# Patient Record
Sex: FEMALE | Race: WHITE | Employment: OTHER | URBAN - METROPOLITAN AREA
[De-identification: names, ages, dates, MRNs, and addresses within clinical notes are randomized per-mention and may not be internally consistent; named-entity substitution may affect disease eponyms.]

---

## 2017-01-11 ENCOUNTER — TELEPHONE (OUTPATIENT)
Dept: FAMILY MEDICINE | Facility: CLINIC | Age: 67
End: 2017-01-11

## 2017-01-11 NOTE — TELEPHONE ENCOUNTER
Was seen in clinic in Dec 2016 and blood pressure elevated.    BP Readings from Last 6 Encounters:   12/01/16 154/87   09/08/16 171/109   02/18/16 122/73   02/11/16 136/88   01/07/16 160/108   08/10/15 149/86       Due for blood pressure in clinic with RN.  Call to notify patient and assist in scheduling this.    SINDHU Gustafson

## 2017-01-11 NOTE — TELEPHONE ENCOUNTER
Patient returning call for BP recheck.  Scheduled an RN visit on 1/12/17 at 10:00 am for RN visit.  Also requesting a note for work at this visit.  Will close Panel Management encounter for now.

## 2017-01-12 ENCOUNTER — ALLIED HEALTH/NURSE VISIT (OUTPATIENT)
Dept: FAMILY MEDICINE | Facility: CLINIC | Age: 67
End: 2017-01-12
Payer: COMMERCIAL

## 2017-01-12 VITALS — SYSTOLIC BLOOD PRESSURE: 126 MMHG | DIASTOLIC BLOOD PRESSURE: 84 MMHG | RESPIRATION RATE: 18 BRPM | HEART RATE: 88 BPM

## 2017-01-12 DIAGNOSIS — I10 HYPERTENSION, GOAL BELOW 140/90: Primary | ICD-10-CM

## 2017-01-12 PROCEDURE — 99207 ZZC NO CHARGE NURSE ONLY: CPT

## 2017-01-12 NOTE — PROGRESS NOTES
"Cyndy Wang is a 66 year old female who comes in today for a Blood Pressure check because of medication change.    *Document pulse and BP  *Use new set of vitals button for multiple readings.  *Use extended vitals for orthostatic    Vitals as recorded, a regular cuff was used.    Patient is taking medication as prescribed  Patient is tolerating medications well.  Patient is monitoring Blood Pressure at home.  Average readings if yes are \"good\"    Current complaints: cough    Disposition: patient to continue with the same medication.  Also needs note to return to work after acute illness over the last 2 days.  Note given.    "

## 2017-01-12 NOTE — Clinical Note
[facility]  [address] [facility] [address]  Phone: [phone]    January 12, 2017    Cyndy Wang  [address] [place] [address]  [facility] [address]              Cyndy Wang was in the clinic today, she reports that she was sick 1/10/17 and 1/11/17.  Please excuse her from work on those dates, she may return to work today as her symptoms have resolved.              Sincerely,      SINDHU Gustafson / brittp

## 2017-03-13 ENCOUNTER — TELEPHONE (OUTPATIENT)
Dept: PHARMACY | Facility: OTHER | Age: 67
End: 2017-03-13

## 2017-03-13 ENCOUNTER — TELEPHONE (OUTPATIENT)
Dept: FAMILY MEDICINE | Facility: CLINIC | Age: 67
End: 2017-03-13

## 2017-03-13 NOTE — TELEPHONE ENCOUNTER
MTM referral from: [facility] visit (referral by provider)    MTM referral outreach attempt #1 on March 13, 2017 at 3:48 PM      Outcome: No Answer    Carmenza Negrete, MTM Coordinator

## 2017-03-14 NOTE — TELEPHONE ENCOUNTER
MTM referral from: [facility]     MTM referral outreach attempt #2 on March 14, 2017 at 10:32 AM      Outcome: Patient not reachable after several attempts (insurance does not cover MTM), will route to MTM Pharmacist/Provider as an FYI. Thank you for the referral.    Carmenza Negrete, MTM Coordinator

## 2017-05-04 ENCOUNTER — OFFICE VISIT (OUTPATIENT)
Dept: FAMILY MEDICINE | Facility: CLINIC | Age: 67
End: 2017-05-04
Payer: COMMERCIAL

## 2017-05-04 VITALS
WEIGHT: 221.2 LBS | TEMPERATURE: 97.7 F | HEART RATE: 114 BPM | SYSTOLIC BLOOD PRESSURE: 132 MMHG | BODY MASS INDEX: 34.72 KG/M2 | DIASTOLIC BLOOD PRESSURE: 87 MMHG | HEIGHT: 67 IN

## 2017-05-04 DIAGNOSIS — R31.9 HEMATURIA: Primary | ICD-10-CM

## 2017-05-04 DIAGNOSIS — E11.65 TYPE 2 DIABETES MELLITUS WITH HYPERGLYCEMIA, WITHOUT LONG-TERM CURRENT USE OF INSULIN (H): ICD-10-CM

## 2017-05-04 DIAGNOSIS — N10 ACUTE PYELONEPHRITIS: ICD-10-CM

## 2017-05-04 LAB
ALBUMIN UR-MCNC: 100 MG/DL
ANION GAP SERPL CALCULATED.3IONS-SCNC: 8 MMOL/L (ref 3–14)
APPEARANCE UR: ABNORMAL
BACTERIA #/AREA URNS HPF: ABNORMAL /HPF
BILIRUB UR QL STRIP: NEGATIVE
BUN SERPL-MCNC: 27 MG/DL (ref 7–30)
CALCIUM SERPL-MCNC: 9.4 MG/DL (ref 8.5–10.1)
CHLORIDE SERPL-SCNC: 96 MMOL/L (ref 94–109)
CO2 SERPL-SCNC: 27 MMOL/L (ref 20–32)
COLOR UR AUTO: YELLOW
CREAT SERPL-MCNC: 1.22 MG/DL (ref 0.52–1.04)
CREAT UR-MCNC: 129 MG/DL
GFR SERPL CREATININE-BSD FRML MDRD: 44 ML/MIN/1.7M2
GLUCOSE SERPL-MCNC: 419 MG/DL (ref 70–99)
GLUCOSE UR STRIP-MCNC: >=1000 MG/DL
HBA1C MFR BLD: 10.5 % (ref 4.3–6)
HGB BLD-MCNC: 14.7 G/DL (ref 11.7–15.7)
HGB UR QL STRIP: ABNORMAL
KETONES UR STRIP-MCNC: ABNORMAL MG/DL
LEUKOCYTE ESTERASE UR QL STRIP: ABNORMAL
MICROALBUMIN UR-MCNC: 468 MG/L
MICROALBUMIN/CREAT UR: 362.79 MG/G CR (ref 0–25)
NITRATE UR QL: NEGATIVE
NON-SQ EPI CELLS #/AREA URNS LPF: ABNORMAL /LPF
PH UR STRIP: 5 PH (ref 5–7)
POTASSIUM SERPL-SCNC: 4.2 MMOL/L (ref 3.4–5.3)
RBC #/AREA URNS AUTO: ABNORMAL /HPF (ref 0–2)
SODIUM SERPL-SCNC: 131 MMOL/L (ref 133–144)
SP GR UR STRIP: 1.02 (ref 1–1.03)
URN SPEC COLLECT METH UR: ABNORMAL
UROBILINOGEN UR STRIP-ACNC: 0.2 EU/DL (ref 0.2–1)
WBC #/AREA URNS AUTO: ABNORMAL /HPF (ref 0–2)

## 2017-05-04 PROCEDURE — 85018 HEMOGLOBIN: CPT | Performed by: NURSE PRACTITIONER

## 2017-05-04 PROCEDURE — 87086 URINE CULTURE/COLONY COUNT: CPT | Performed by: NURSE PRACTITIONER

## 2017-05-04 PROCEDURE — 80048 BASIC METABOLIC PNL TOTAL CA: CPT | Performed by: NURSE PRACTITIONER

## 2017-05-04 PROCEDURE — 87088 URINE BACTERIA CULTURE: CPT | Performed by: NURSE PRACTITIONER

## 2017-05-04 PROCEDURE — 87186 SC STD MICRODIL/AGAR DIL: CPT | Performed by: NURSE PRACTITIONER

## 2017-05-04 PROCEDURE — 83036 HEMOGLOBIN GLYCOSYLATED A1C: CPT | Performed by: NURSE PRACTITIONER

## 2017-05-04 PROCEDURE — 82043 UR ALBUMIN QUANTITATIVE: CPT | Performed by: NURSE PRACTITIONER

## 2017-05-04 PROCEDURE — 99214 OFFICE O/P EST MOD 30 MIN: CPT | Performed by: NURSE PRACTITIONER

## 2017-05-04 PROCEDURE — 81001 URINALYSIS AUTO W/SCOPE: CPT | Performed by: NURSE PRACTITIONER

## 2017-05-04 PROCEDURE — 36415 COLL VENOUS BLD VENIPUNCTURE: CPT | Performed by: NURSE PRACTITIONER

## 2017-05-04 RX ORDER — CIPROFLOXACIN 250 MG/1
250 TABLET, FILM COATED ORAL 2 TIMES DAILY
Qty: 14 TABLET | Refills: 0 | Status: SHIPPED | OUTPATIENT
Start: 2017-05-04 | End: 2017-05-11

## 2017-05-04 NOTE — PROGRESS NOTES
"  SUBJECTIVE:                                                    Cyndy Wang is a 67 year old female who presents to clinic today for the following health issues: small amounts of blood in urine, dark color urine, frequent and painful urination, lower abdominal pain and bilateral flank dull ache.   Symptoms started about 2 weeks ago.  Has history of diabetes, likely uncontrolled, states that her sugars been high at home, sometimes over 200, she is on Metformin 2000 mg daily, she was prescribed Victosa, but its not covered by her insurance. Reports that she is not consistently following diabetic diet, reports drinking pop every day.     URINARY TRACT SYMPTOMS     Onset: 2 weeks     Description:   Painful urination (Dysuria): YES  Blood in urine (Hematuria): YES  Delay in urine (Hesitency): no     Intensity: moderate    Progression of Symptoms:  worsening    Accompanying Signs & Symptoms:  Fever/chills: Sweats at night   Flank pain YES  Nausea and vomiting: no once in awhile she gets dizzy   Any vaginal symptoms: vaginal odor and abnormal vaginal bleeding  Abdominal/Pelvic Pain: YES   History:   History of frequent UTI's: no   History of kidney stones: no   Sexually Active: no   Possibility of pregnancy: No    Precipitating factors:   None        Therapies Tried and outcome: none     Problem list and histories reviewed & adjusted, as indicated.  Additional history: as documented    Labs reviewed in EPIC    Reviewed and updated as needed this visit by clinical staff  Tobacco  Allergies  Med Hx  Surg Hx  Fam Hx  Soc Hx      Reviewed and updated as needed this visit by Provider         ROS:  Constitutional, HEENT, cardiovascular, pulmonary, gi and gu systems are negative, except as otherwise noted.    OBJECTIVE:                                                    /87  Pulse 114  Temp 97.7  F (36.5  C) (Tympanic)  Ht 5' 7\" (1.702 m)  Wt 221 lb 3.2 oz (100.3 kg)  BMI 34.64 kg/m2  Body mass index is 34.64 " kg/(m^2).  GENERAL: healthy, alert and no distress  RESP: lungs clear to auscultation - no rales, rhonchi or wheezes  CV: regular rate and rhythm, normal S1 S2, no S3 or S4, no murmur, click or rub, no peripheral edema and peripheral pulses strong  ABDOMEN: tenderness suprapubic and bowel sounds normal  BACK: bilateral CVA tenderness    Diagnostic Test Results:  Urinalysis - positive for UTI      ASSESSMENT/PLAN:                                                      1. Hematuria  - UA with Microscopic reflex to Culture-positive for UTI, glucosuria due to uncontrolled diabetes, has history of frequent urinary infections, likely due to uncontrolled diabetes, added urine culture-pending   - Basic metabolic panel-hyperglycemia, hyponatremia and slightly elevated Creatinine level due to uncontrolled diabetes   - Hemoglobin-normal   - Urine Culture Aerobic Bacterial  - start ciprofloxacin (CIPRO) 250 MG tablet; Take 1 tablet (250 mg) by mouth 2 times daily for 7 days  Dispense: 14 tablet; Refill: 0    2. Type 2 diabetes mellitus with hyperglycemia, without long-term current use of insulin (H)  - Hemoglobin A1C-10.5%  - start insulin glargine (LANTUS) 100 UNIT/ML injection; Inject 10 Units Subcutaneous At Bedtime  Dispense: 3 mL; Refill: 1  - insulin pen needle (B-D U/F) 31G X 8 MM; Use once daily or as directed.  Dispense: 30 each; Refill: prn  -continue Metformin 2000 mg daily  - DIABETES EDUCATOR REFERRAL  - Albumin Random Urine Quantitative  -follow up in 2 weeks to recheck labs and diabetes, should bring list of her home accu-checks     3. Acute pyelonephritis  -has CVA tenderness, mild proteinuria, no fevers, chills, nausea, will start Cipro to cover possible pyelonephritis    - Urine Culture Aerobic Bacterial  - ciprofloxacin (CIPRO) 250 MG tablet; Take 1 tablet (250 mg) by mouth 2 times daily for 7 days  Dispense: 14 tablet; Refill: 0  -drink more fluids  -avoid pop, sodas, all sugary drinks     See Patient  Instructions    TY Wong [facility]

## 2017-05-04 NOTE — NURSING NOTE
"Chief Complaint   Patient presents with     Vaginal Problem     When she wipes there is blood on the toilet paper, has been going on for 2 weeks.        Initial /87  Pulse 114  Temp 97.7  F (36.5  C) (Tympanic)  Ht 5' 7\" (1.702 m)  Wt 221 lb 3.2 oz (100.3 kg)  BMI 34.64 kg/m2 Estimated body mass index is 34.64 kg/(m^2) as calculated from the following:    Height as of this encounter: 5' 7\" (1.702 m).    Weight as of this encounter: 221 lb 3.2 oz (100.3 kg).  Medication Reconciliation: complete  "

## 2017-05-04 NOTE — PATIENT INSTRUCTIONS
A1C  Electrolytes and kidney function  Potassium was probably low due to high blood sugars  Will check diabetic labs today, will consider starting additional medication to help to lower sugars       Urine positive for UTI   Start Cipro 1 tablet twice daily for 7 days   Drink enough fluids  Stop drinking soda and pop  Follow diabetic diet

## 2017-05-07 LAB
BACTERIA SPEC CULT: ABNORMAL
MICRO REPORT STATUS: ABNORMAL
MICROORGANISM SPEC CULT: ABNORMAL
SPECIMEN SOURCE: ABNORMAL

## 2017-05-10 ENCOUNTER — TELEPHONE (OUTPATIENT)
Dept: FAMILY MEDICINE | Facility: CLINIC | Age: 67
End: 2017-05-10

## 2017-05-10 DIAGNOSIS — E11.69 TYPE 2 DIABETES MELLITUS WITH OTHER SPECIFIED COMPLICATION (H): Primary | ICD-10-CM

## 2017-05-10 NOTE — TELEPHONE ENCOUNTER
Below is copied from last lab result note.    Notes Recorded by Tammy Zelaya APRN CNP on 5/4/2017 at 4:36 PM  Please inform patient that her Hemoglobin A1C is very high 10.5%  Glucose level is 419, uncontrolled diabetes affecting her kidney function, creatinine level 1.22 (slightly elevated), she has hyponatremia-low Sodium and this is due to very high sugars.  Start Lantus 10 units daily, continue Metformin, monitor blood sugars daily, follow diabetic diet and also recommend to schedule with diabetic educator. Follow up in 2 weeks to recheck labs, bring list of home glucose monitoring numbers.        TY Wong CNP        Prescriptions sent to pharmacy per protocol.    Rebekah Nguyen RN

## 2017-05-10 NOTE — TELEPHONE ENCOUNTER
Reason for Call:  Other prescription    Detailed comments: Pt calling to ask Dr. Zelaya for Rxs for a new Diabetic testing meter, test strips and lancets - [facility] pharmacy  - So that he insurance will cover the cost    Phone Number Patient can be reached at: Home number on file [phone] (home)    Best Time: any    Can we leave a detailed message on this number? YES    Call taken on 5/10/2017 at 2:50 PM by Patriac Cotter

## 2017-07-26 ENCOUNTER — TELEPHONE (OUTPATIENT)
Dept: FAMILY MEDICINE | Facility: CLINIC | Age: 67
End: 2017-07-26

## 2017-07-26 NOTE — TELEPHONE ENCOUNTER
Panel Management Review      Patient has the following on her problem list:     Diabetes    ASA: Not Required     Last A1C  Lab Results   Component Value Date    A1C 10.5 05/04/2017    A1C 7.0 12/01/2016    A1C 7.9 02/11/2016    A1C 6.1 06/02/2015    A1C 7.5 01/15/2015     A1C tested: FAILED    Last LDL:    Lab Results   Component Value Date    CHOL 170 02/11/2016     Lab Results   Component Value Date    HDL 37 02/11/2016     Lab Results   Component Value Date     02/11/2016     Lab Results   Component Value Date    TRIG 125 02/11/2016     Lab Results   Component Value Date    CHOLHDLRATIO 5.9 01/15/2015     Lab Results   Component Value Date    NHDL 133 02/11/2016       Is the patient on a Statin? YES             Is the patient on Aspirin? NO    Medications     HMG CoA Reductase Inhibitors    pravastatin (PRAVACHOL) 20 MG tablet    Salicylates    aspirin 325 MG EC tablet          Last three blood pressure readings:  BP Readings from Last 3 Encounters:   05/04/17 132/87   01/12/17 126/84   12/01/16 154/87       Date of last diabetes office visit: 5/4/17     Tobacco History:     History   Smoking Status     Former Smoker     Types: Cigarettes   Smokeless Tobacco     Never Used     Comment: 1-2 cigarettes occasionally             Composite cancer screening  Chart review shows that this patient is due/due soon for the following Mammogram  Summary:    Patient is due/failing the following:   A1C    Action needed:   Patient needs office visit for diabetic check and labs.    Type of outreach:    Phone, left message for patient to call back.     Questions for provider review:    None                                                                                                                                    Hoa Gunter CMA ([facility])       Chart routed to Care Team .

## 2017-08-24 ENCOUNTER — OFFICE VISIT (OUTPATIENT)
Dept: FAMILY MEDICINE | Facility: CLINIC | Age: 67
End: 2017-08-24
Payer: COMMERCIAL

## 2017-08-24 VITALS
SYSTOLIC BLOOD PRESSURE: 136 MMHG | HEIGHT: 67 IN | TEMPERATURE: 97 F | WEIGHT: 227 LBS | BODY MASS INDEX: 35.63 KG/M2 | DIASTOLIC BLOOD PRESSURE: 82 MMHG

## 2017-08-24 DIAGNOSIS — R21 RASH AND NONSPECIFIC SKIN ERUPTION: Primary | ICD-10-CM

## 2017-08-24 DIAGNOSIS — I10 HYPERTENSION GOAL BP (BLOOD PRESSURE) < 140/90: ICD-10-CM

## 2017-08-24 DIAGNOSIS — E78.5 HYPERLIPIDEMIA LDL GOAL <100: ICD-10-CM

## 2017-08-24 DIAGNOSIS — Z12.31 ENCOUNTER FOR SCREENING MAMMOGRAM FOR BREAST CANCER: ICD-10-CM

## 2017-08-24 DIAGNOSIS — F50.819 BINGE EATING DISORDER: ICD-10-CM

## 2017-08-24 DIAGNOSIS — E66.09 OBESITY DUE TO EXCESS CALORIES, UNSPECIFIED OBESITY SEVERITY: ICD-10-CM

## 2017-08-24 DIAGNOSIS — E11.69 TYPE 2 DIABETES MELLITUS WITH OTHER SPECIFIED COMPLICATION (H): ICD-10-CM

## 2017-08-24 DIAGNOSIS — F33.1 MAJOR DEPRESSIVE DISORDER, RECURRENT EPISODE, MODERATE (H): ICD-10-CM

## 2017-08-24 DIAGNOSIS — G35 MS (MULTIPLE SCLEROSIS) (H): ICD-10-CM

## 2017-08-24 DIAGNOSIS — E11.29 TYPE 2 DIABETES MELLITUS WITH OTHER DIABETIC KIDNEY COMPLICATION (H): ICD-10-CM

## 2017-08-24 LAB
ANION GAP SERPL CALCULATED.3IONS-SCNC: 5 MMOL/L (ref 3–14)
BUN SERPL-MCNC: 16 MG/DL (ref 7–30)
CALCIUM SERPL-MCNC: 9.5 MG/DL (ref 8.5–10.1)
CHLORIDE SERPL-SCNC: 102 MMOL/L (ref 94–109)
CHOLEST SERPL-MCNC: 212 MG/DL
CO2 SERPL-SCNC: 30 MMOL/L (ref 20–32)
CREAT SERPL-MCNC: 0.9 MG/DL (ref 0.52–1.04)
GFR SERPL CREATININE-BSD FRML MDRD: 62 ML/MIN/1.7M2
GLUCOSE SERPL-MCNC: 190 MG/DL (ref 70–99)
HBA1C MFR BLD: 8.2 % (ref 4.3–6)
HDLC SERPL-MCNC: 37 MG/DL
LDLC SERPL CALC-MCNC: 141 MG/DL
NONHDLC SERPL-MCNC: 175 MG/DL
POTASSIUM SERPL-SCNC: 4.5 MMOL/L (ref 3.4–5.3)
SODIUM SERPL-SCNC: 137 MMOL/L (ref 133–144)
TRIGL SERPL-MCNC: 171 MG/DL
TSH SERPL DL<=0.005 MIU/L-ACNC: 2.25 MU/L (ref 0.4–4)

## 2017-08-24 PROCEDURE — 99215 OFFICE O/P EST HI 40 MIN: CPT | Performed by: NURSE PRACTITIONER

## 2017-08-24 PROCEDURE — 80061 LIPID PANEL: CPT | Performed by: NURSE PRACTITIONER

## 2017-08-24 PROCEDURE — 84443 ASSAY THYROID STIM HORMONE: CPT | Performed by: NURSE PRACTITIONER

## 2017-08-24 PROCEDURE — 80048 BASIC METABOLIC PNL TOTAL CA: CPT | Performed by: NURSE PRACTITIONER

## 2017-08-24 PROCEDURE — 83036 HEMOGLOBIN GLYCOSYLATED A1C: CPT | Performed by: NURSE PRACTITIONER

## 2017-08-24 PROCEDURE — 36415 COLL VENOUS BLD VENIPUNCTURE: CPT | Performed by: NURSE PRACTITIONER

## 2017-08-24 PROCEDURE — 99207 C FOOT EXAM  NO CHARGE: CPT | Performed by: NURSE PRACTITIONER

## 2017-08-24 RX ORDER — TRIAMCINOLONE ACETONIDE 1 MG/G
CREAM TOPICAL
Qty: 15 G | Refills: 0 | Status: SHIPPED | OUTPATIENT
Start: 2017-08-24 | End: 2021-03-05

## 2017-08-24 RX ORDER — PIOGLITAZONEHYDROCHLORIDE 30 MG/1
30 TABLET ORAL DAILY
Qty: 30 TABLET | Refills: 3 | Status: SHIPPED | OUTPATIENT
Start: 2017-08-24 | End: 2017-08-25

## 2017-08-24 RX ORDER — TOPIRAMATE 25 MG/1
TABLET, FILM COATED ORAL
Qty: 60 TABLET | Refills: 6 | Status: SHIPPED | OUTPATIENT
Start: 2017-08-24 | End: 2018-01-16

## 2017-08-24 ASSESSMENT — PATIENT HEALTH QUESTIONNAIRE - PHQ9
SUM OF ALL RESPONSES TO PHQ QUESTIONS 1-9: 2
5. POOR APPETITE OR OVEREATING: SEVERAL DAYS

## 2017-08-24 ASSESSMENT — ANXIETY QUESTIONNAIRES
IF YOU CHECKED OFF ANY PROBLEMS ON THIS QUESTIONNAIRE, HOW DIFFICULT HAVE THESE PROBLEMS MADE IT FOR YOU TO DO YOUR WORK, TAKE CARE OF THINGS AT HOME, OR GET ALONG WITH OTHER PEOPLE: SOMEWHAT DIFFICULT
7. FEELING AFRAID AS IF SOMETHING AWFUL MIGHT HAPPEN: NOT AT ALL
GAD7 TOTAL SCORE: 3
2. NOT BEING ABLE TO STOP OR CONTROL WORRYING: NOT AT ALL
6. BECOMING EASILY ANNOYED OR IRRITABLE: SEVERAL DAYS
3. WORRYING TOO MUCH ABOUT DIFFERENT THINGS: NOT AT ALL
5. BEING SO RESTLESS THAT IT IS HARD TO SIT STILL: NOT AT ALL
1. FEELING NERVOUS, ANXIOUS, OR ON EDGE: SEVERAL DAYS

## 2017-08-24 NOTE — MR AVS SNAPSHOT
After Visit Summary   8/24/2017    Cyndy Wang    MRN: [id number]           Patient Information     Date Of Birth          1950        Visit Information        Provider Department      8/24/2017 9:40 AM La Anderson NP [facility]        Today's Diagnoses     Rash and nonspecific skin eruption    -  1    MS (multiple sclerosis) (H)        Hyperlipidemia LDL goal <100        Hypertension goal BP (blood pressure) < 140/90        Type 2 diabetes mellitus with other diabetic kidney complication (H)        Type 2 diabetes mellitus with other specified complication (H)        Major depressive disorder, recurrent episode, moderate (H)        Obesity due to excess calories, unspecified obesity severity        Binge eating disorder        Encounter for screening mammogram for breast cancer          Care Instructions    Start Pioglitazone 30 mg once daily in am.  Recheck labs and follow up Diabetic visit in 3 months.    Hypoglycemia Treatment/Prevention:    Avoid refined foods heavy in sugars or refined (white) flour.  Examples would be baked goods, breads, white pastas, white rice, sugar beverages, alcohol,etc.    If you do eat meals with these ingredients, try to eat smaller amounts and combine them with lean meats or other proteins.  Eating some nuts like almonds or a small amount of non-fat cottage cheese between meals is often helpful as well.      Spread your calories out throughout the day more.  Instead of 2 or 3 meals, eat the same amount of calories spread out over 5 or 6 meals.      If you become symptomatic, have something to eat like an apple or milk.  This should help resolve the symptoms.    As a general rule, the healthier your lifestyle is and the closer you are to your ideal weight, the less likely you will be to have problems with this.  In some cases, these symptoms can indicate a predisposition to diabetes in the future so it is important to have a fasting blood  sugar checked yearly.    SINDHU Gustafson            Follow-ups after your visit        Additional Services     OPHTHALMOLOGY ADULT REFERRAL       Your provider has referred you to: [facility]: [facility] [phone]   [email or web address]    Please be aware that coverage of these services is subject to the terms and limitations of your health insurance plan.  Call member services at your health plan with any benefit or coverage questions.      Please bring the following with you to your appointment:    (1) Any X-Rays, CTs or MRIs which have been performed.  Contact the facility where they were done to arrange for  prior to your scheduled appointment.    (2) List of current medications  (3) This referral request   (4) Any documents/labs given to you for this referral                  Future tests that were ordered for you today     Open Future Orders        Priority Expected Expires Ordered    *MA Screening Digital Bilateral Routine  8/24/2018 8/24/2017            Who to contact     If you have questions or need follow up information about today's clinic visit or your schedule please contact [facility] directly at [phone].  Normal or non-critical lab and imaging results will be communicated to you by MyChart, letter or phone within 4 business days after the clinic has received the results. If you do not hear from us within 7 days, please contact the clinic through [employer]ck[employer] or phone. If you have a critical or abnormal lab result, we will notify you by phone as soon as possible.  Submit refill requests through GoPlaceIt or call your pharmacy and they will forward the refill request to us. Please allow 3 business days for your refill to be completed.          Additional Information About Your Visit        GoPlaceIt Information     GoPlaceIt lets you send messages to your doctor, view your test results, renew your prescriptions, schedule appointments and more. To sign up, go  "to [email or web address] . Click on \"Log in\" on the left side of the screen, which will take you to the Welcome page. Then click on \"Sign up Now\" on the right side of the page.     You will be asked to enter the access code listed below, as well as some personal information. Please follow the directions to create your username and password.     Your access code is: [email or web address]  Expires: 2017 10:22 AM     Your access code will  in 90 days. If you need help or a new code, please call your [facility] clinic or [phone].        Care EveryWhere ID     This is your Care EveryWhere ID. This could be used by other organizations to access your [facility] medical records  [id number]        Your Vitals Were     Temperature Height Breastfeeding? BMI (Body Mass Index)          97  F (36.1  C) (Tympanic) 5' 7\" (1.702 m) No 35.55 kg/m2         Blood Pressure from Last 3 Encounters:   17 136/82   17 132/87   17 126/84    Weight from Last 3 Encounters:   17 227 lb (103 kg)   17 221 lb 3.2 oz (100.3 kg)   16 224 lb (101.6 kg)              We Performed the Following     Basic metabolic panel     DEPRESSION ACTION PLAN (DAP)     FOOT EXAM     Hemoglobin A1c     Lipid panel reflex to direct LDL     OPHTHALMOLOGY ADULT REFERRAL     TSH with free T4 reflex          Today's Medication Changes          These changes are accurate as of: 17 10:22 AM.  If you have any questions, ask your nurse or doctor.               Start taking these medicines.        Dose/Directions    pioglitazone 30 MG tablet   Commonly known as:  ACTOS   Used for:  Type 2 diabetes mellitus with other diabetic kidney complication (H), Type 2 diabetes mellitus with other specified complication (H)   Started by:  La Anderson NP        Dose:  30 mg   Take 1 tablet (30 mg) by mouth daily   Quantity:  30 tablet   Refills:  3       triamcinolone 0.1 % cream   Commonly known as:  KENALOG   Used for:  Rash and " nonspecific skin eruption   Started by:  La Anderson NP        Apply sparingly to affected area three times daily for 14 days.   Quantity:  15 g   Refills:  0         These medicines have changed or have updated prescriptions.        Dose/Directions    topiramate 25 MG tablet   Commonly known as:  TOPAMAX   This may have changed:  additional instructions   Used for:  Obesity due to excess calories, unspecified obesity severity, Binge eating disorder   Changed by:  La Anderson NP        Take 1 tablet in am and 1 tablet in pm.   Quantity:  60 tablet   Refills:  6            Where to get your medicines      These medications were sent to [facility] [address]  [address] [facility] [address] [facility] [address]     Phone:  [phone]     pioglitazone 30 MG tablet    topiramate 25 MG tablet    triamcinolone 0.1 % cream                Primary Care Provider Office Phone # Fax #    La Anderson [phone] [address] [facility] [address]        Equal Access to Services     ALTHEA CARLOS : Hadii aad ku hadasho Soomaali, waaxda luqadaha, qaybta kaalmada adeegyada, waxay idiin hayaan dionneeg khjesusita hall . So [facility] [phone].    ATENCIÓN: Si habla español, tiene a martínez disposición servicios gratuitos de asistencia lingüística. [facility] [phone].    We comply with applicable federal civil rights laws and [place] laws. We do not discriminate on the basis of race, color, national origin, age, disability sex, sexual orientation or gender identity.            Thank you!     Thank you for choosing [facility]  for your care. Our goal is always to provide you with excellent care. Hearing back from our patients is one way we can continue to improve our services. Please take a few minutes to complete the written survey that you may receive in the mail after your visit with us. Thank you!             Your Updated Medication List -  Protect others around you: Learn how to safely use, store and throw away your medicines at www.disposemymeds.org.          This list is accurate as of: 8/24/17 10:22 AM.  Always use your most recent med list.                   Brand Name Dispense Instructions for use Diagnosis    ACE NOT PRESCRIBED (INTENTIONAL)     0 each    1 each ACE Inhibitor not prescribed due to Allergy        aspirin 325 MG EC tablet     100 tablet    Take 1 tablet by mouth daily.    Type 2 diabetes, HbA1C goal < 8% (H)       blood glucose lancets standard    no brand specified    1 Box    Use to test blood sugar daily    Type 2 diabetes mellitus with other specified complication (H)       blood glucose monitoring meter device kit    no brand specified    1 kit    Use to test blood sugar daily.    Type 2 diabetes mellitus with other specified complication (H)       * blood glucose monitoring test strip    no brand specified    100 strip    by In Vitro route 4 times daily.    Type 2 diabetes, HbA1C goal < 8% (H)       * blood glucose monitoring test strip    no brand specified    100 strip    Use to test blood sugars daily    Type 2 diabetes mellitus with other specified complication (H)       insulin pen needle 31G X 8 MM    B-D U/F    30 each    Use once daily or as directed.    Type 2 diabetes mellitus with hyperglycemia, without long-term current use of insulin (H)       levothyroxine 175 MCG tablet    SYNTHROID/LEVOTHROID    90 tablet    Take 1 tablet (175 mcg) by mouth daily    Hypothyroidism, unspecified type       losartan 50 MG tablet    COZAAR    30 tablet    Take 1 tablet (50 mg) by mouth daily    Type 2 diabetes mellitus with other diabetic kidney complication (H), Type 2 diabetes mellitus with other specified complication (H)       * order for DME     1 each    Glucometer, brand as covered by insurance.    Type 2 diabetes, HbA1C goal < 8% (H)       * order for DME     400 each    Test strips for pt's glucometer, brand as covered  by insurance. Test four times daily and prn.    Type 2 diabetes, HbA1C goal < 8% (H)       * order for DME     400 each    Lancets.  Four times daily and prn.    Type 2 diabetes, HbA1C goal < 8% (H)       pioglitazone 30 MG tablet    ACTOS    30 tablet    Take 1 tablet (30 mg) by mouth daily    Type 2 diabetes mellitus with other diabetic kidney complication (H), Type 2 diabetes mellitus with other specified complication (H)       pravastatin 20 MG tablet    PRAVACHOL    30 tablet    Take 1 tablet (20 mg) by mouth daily    Hyperlipidemia LDL goal <100       topiramate 25 MG tablet    TOPAMAX    60 tablet    Take 1 tablet in am and 1 tablet in pm.    Obesity due to excess calories, unspecified obesity severity, Binge eating disorder       triamcinolone 0.1 % cream    KENALOG    15 g    Apply sparingly to affected area three times daily for 14 days.    Rash and nonspecific skin eruption       venlafaxine 75 MG 24 hr capsule    EFFEXOR-XR    270 capsule    Take 3 capsules (225 mg) daily.    Major depressive disorder, recurrent episode, moderate (H)       * Notice:  This list has 5 medication(s) that are the same as other medications prescribed for you. Read the directions carefully, and ask your doctor or other care provider to review them with you.

## 2017-08-24 NOTE — NURSING NOTE
"Initial /82  Temp 97  F (36.1  C) (Tympanic)  Ht 5' 7\" (1.702 m)  Wt 227 lb (103 kg)  Breastfeeding? No  BMI 35.55 kg/m2 Estimated body mass index is 35.55 kg/(m^2) as calculated from the following:    Height as of this encounter: 5' 7\" (1.702 m).    Weight as of this encounter: 227 lb (103 kg). .    Hoa Guntre CMA ([facility])  "

## 2017-08-24 NOTE — PATIENT INSTRUCTIONS
Start Pioglitazone 30 mg once daily in am.  Recheck labs and follow up Diabetic visit in 3 months.    Hypoglycemia Treatment/Prevention:    Avoid refined foods heavy in sugars or refined (white) flour.  Examples would be baked goods, breads, white pastas, white rice, sugar beverages, alcohol,etc.    If you do eat meals with these ingredients, try to eat smaller amounts and combine them with lean meats or other proteins.  Eating some nuts like almonds or a small amount of non-fat cottage cheese between meals is often helpful as well.      Spread your calories out throughout the day more.  Instead of 2 or 3 meals, eat the same amount of calories spread out over 5 or 6 meals.      If you become symptomatic, have something to eat like an apple or milk.  This should help resolve the symptoms.    As a general rule, the healthier your lifestyle is and the closer you are to your ideal weight, the less likely you will be to have problems with this.  In some cases, these symptoms can indicate a predisposition to diabetes in the future so it is important to have a fasting blood sugar checked yearly.    SINDHU Gustafson

## 2017-08-24 NOTE — PROGRESS NOTES
SUBJECTIVE:   Cyndy Wang is a 67 year old female who presents to clinic today for the following health issues:    Diabetes Follow-up      Patient is checking blood sugars: not at all    Diabetic concerns: None     Symptoms of hypoglycemia (low blood sugar): shaky, dizzy, weak     Paresthesias (numbness or burning in feet) or sores: Yes, tingling in her toes.      Date of last diabetic eye exam: she has not had one this year yet, she will be scheduling soon as she needs new glasses.       Was on Lantus and Metformin - stopped these.  Non compliant with medications.    Lab Results   Component Value Date    A1C 10.5 05/04/2017    A1C 7.0 12/01/2016    A1C 7.9 02/11/2016    A1C 6.1 06/02/2015    A1C 7.5 01/15/2015       Hyperlipidemia Follow-Up      Rate your low fat/cholesterol diet?: fair    Taking statin?  Yes, no muscle aches from statin    Other lipid medications/supplements?:  None    Lab Results   Component Value Date    CHOL 170 02/11/2016     Lab Results   Component Value Date    HDL 37 02/11/2016     Lab Results   Component Value Date     02/11/2016     Lab Results   Component Value Date    TRIG 125 02/11/2016     Lab Results   Component Value Date    CHOLHDLRATIO 5.9 01/15/2015         Hypertension Follow-up      Outpatient blood pressures are not being checked.    Low Salt Diet: low salt    BP Readings from Last 3 Encounters:   08/24/17 136/82   05/04/17 132/87   01/12/17 126/84             Amount of exercise or physical activity: no structured exercise but she stays very active in her job.     Problems taking medications regularly: No    Medication side effects: none       Diet: low salt and low fat/cholesterol      Problem list and histories reviewed & adjusted, as indicated.  Additional history: as documented    Patient Active Problem List   Diagnosis     MS (multiple sclerosis) (H)     Hyperlipidemia LDL goal <100     Vitamin D deficiency disease     Health Care Home     Advanced directives,  counseling/discussion     Generalized anxiety disorder     Hypertension goal BP (blood pressure) < 140/90     Type 2 diabetes mellitus with other diabetic kidney complication (H)     Obesity due to excess calories, unspecified obesity severity     Binge eating disorder     Type 2 diabetes mellitus with other specified complication (H)     Hypertension, goal below 140/90     Hypothyroidism, unspecified type     Major depressive disorder, recurrent episode, moderate (H)     Past Surgical History:   Procedure Laterality Date     REPAIR TENDON FINGER(S) Right 5/22/2015    Procedure: REPAIR TENDON FINGER(S);  Surgeon: Xavier Paulson MD;  Location: [facility]       Social History   Substance Use Topics     Smoking status: Former Smoker     Types: Cigarettes     Smokeless tobacco: Never Used      Comment: 1-2 cigarettes occasionally     Alcohol use No     Family History   Problem Relation Age of Onset     Neurologic Disorder Mother      Stroke     HEART DISEASE Maternal Grandfather      NAZANIN.A.JOCELINE. Maternal Grandmother      Breast Cancer No family hx of      Cancer - colorectal No family hx of      Melanoma No family hx of          Current Outpatient Prescriptions   Medication Sig Dispense Refill     triamcinolone (KENALOG) 0.1 % cream Apply sparingly to affected area three times daily for 14 days. 15 g 0     topiramate (TOPAMAX) 25 MG tablet Take 1 tablet in am and 1 tablet in pm. 60 tablet 6     pioglitazone (ACTOS) 30 MG tablet Take 1 tablet (30 mg) by mouth daily 30 tablet 3     venlafaxine (EFFEXOR-XR) 75 MG 24 hr capsule Take 3 capsules (225 mg) daily. 270 capsule 3     levothyroxine (SYNTHROID/LEVOTHROID) 175 MCG tablet Take 1 tablet (175 mcg) by mouth daily 90 tablet 3     losartan (COZAAR) 50 MG tablet Take 1 tablet (50 mg) by mouth daily 30 tablet 11     pravastatin (PRAVACHOL) 20 MG tablet Take 1 tablet (20 mg) by mouth daily 30 tablet 11     aspirin 325 MG EC tablet Take 1 tablet by mouth daily. 100 tablet 3      blood glucose monitoring (NO BRAND SPECIFIED) test strip Use to test blood sugars daily 100 strip 1     blood glucose monitoring (NO BRAND SPECIFIED) meter device kit Use to test blood sugar daily. 1 kit 0     blood glucose (NO BRAND SPECIFIED) lancets standard Use to test blood sugar daily 1 Box 1     insulin pen needle (B-D U/F) 31G X 8 MM Use once daily or as directed. 30 each prn     [DISCONTINUED] topiramate (TOPAMAX) 25 MG tablet Take 1 tablet in am and 2 tablets in pm. 90 tablet 6     ACE NOT PRESCRIBED, INTENTIONAL, 1 each ACE Inhibitor not prescribed due to Allergy 0 each 0     glucose blood VI test strips strip by In Vitro route 4 times daily. 100 strip 12     ORDER FOR DME Glucometer, brand as covered by insurance. 1 each 0     ORDER FOR DME Test strips for pt's glucometer, brand as covered by insurance.  Test four times daily and prn. 400 each 4     ORDER FOR DME Lancets.  Four times daily and prn. 400 each 4     Allergies   Allergen Reactions     Sulfa Drugs Difficulty breathing     Lisinopril Cough     Penicillins Difficulty breathing     Recent Labs   Lab Test  08/24/17   1031  05/04/17   1422  12/01/16   1051  09/08/16   1916   02/11/16   0936  07/31/15   1100  06/02/15   0958  01/15/15   1433   10/10/12   1012   A1C  8.2*  10.5*  7.0*   --    --   7.9*   --   6.1*  7.5*   < >  12.3*   LDL  141*   --    --    --    --   108*   --    --   187*   < >  123   HDL  37*   --    --    --    --   37*   --    --   45*   < >  32*   TRIG  171*   --    --    --    --   125   --    --   166*   < >  378*   ALT   --    --    --   40   --    --    --   26   --    --   95*   CR  0.90  1.22*  0.95   --    < >  0.84   --   1.01  0.75   < >   --    GFRESTIMATED  62  44*  59*   --    < >  68   --   55*  78   < >   --    GFRESTBLACK  75  53*  71   --    < >  82   --   66  >90   GFR Calc     < >   --    POTASSIUM  4.5  4.2  3.3*   --    < >  3.7   --   3.8  3.7   --    --    TSH   --    --    --   2.55   --  "   --   0.43  17.20*  7.97*   < >   --     < > = values in this interval not displayed.      BP Readings from Last 3 Encounters:   08/24/17 136/82   05/04/17 132/87   01/12/17 126/84    Wt Readings from Last 3 Encounters:   08/24/17 227 lb (103 kg)   05/04/17 221 lb 3.2 oz (100.3 kg)   12/01/16 224 lb (101.6 kg)                  Labs reviewed in EPIC          Reviewed and updated as needed this visit by clinical staff     Reviewed and updated as needed this visit by Provider         ROS:  Constitutional, HEENT, cardiovascular, pulmonary, GI, , musculoskeletal, neuro, skin, endocrine and psych systems are negative, except as otherwise noted.      OBJECTIVE:   /82  Temp 97  F (36.1  C) (Tympanic)  Ht 5' 7\" (1.702 m)  Wt 227 lb (103 kg)  Breastfeeding? No  BMI 35.55 kg/m2  Body mass index is 35.55 kg/(m^2).  GENERAL: alert, no distress and obese  NECK: no adenopathy, no asymmetry, masses, or scars and thyroid normal to palpation  RESP: lungs clear to auscultation - no rales, rhonchi or wheezes  CV: regular rate and rhythm, normal S1 S2, no S3 or S4, no murmur, click or rub, no peripheral edema and peripheral pulses strong  ABDOMEN: soft, nontender, no hepatosplenomegaly, no masses and bowel sounds normal  MS: no gross musculoskeletal defects noted, no edema  SKIN: Rash left thumb - scaly, mild erythema.  PSYCH: mentation appears normal, affect normal/bright and anxious  Diabetic foot exam: normal DP and PT pulses, no trophic changes or ulcerative lesions and normal sensory exam    Diagnostic Test Results:  Results for orders placed or performed in visit on 08/24/17 (from the past 24 hour(s))   Basic metabolic panel   Result Value Ref Range    Sodium 137 133 - 144 mmol/L    Potassium 4.5 3.4 - 5.3 mmol/L    Chloride 102 94 - 109 mmol/L    Carbon Dioxide 30 20 - 32 mmol/L    Anion Gap 5 3 - 14 mmol/L    Glucose 190 (H) 70 - 99 mg/dL    Urea Nitrogen 16 7 - 30 mg/dL    Creatinine 0.90 0.52 - 1.04 mg/dL    GFR " Estimate 62 >60 mL/min/1.7m2    GFR Estimate If Black 75 >60 mL/min/1.7m2    Calcium 9.5 8.5 - 10.1 mg/dL   Hemoglobin A1c   Result Value Ref Range    Hemoglobin A1C 8.2 (H) 4.3 - 6.0 %   Lipid panel reflex to direct LDL   Result Value Ref Range    Cholesterol 212 (H) <200 mg/dL    Triglycerides 171 (H) <150 mg/dL    HDL Cholesterol 37 (L) >49 mg/dL    LDL Cholesterol Calculated 141 (H) <100 mg/dL    Non HDL Cholesterol 175 (H) <130 mg/dL       ASSESSMENT/PLAN:     1. Rash and nonspecific skin eruption     - triamcinolone (KENALOG) 0.1 % cream; Apply sparingly to affected area three times daily for 14 days.  Dispense: 15 g; Refill: 0    2. MS (multiple sclerosis) (H)  Non compliant with follow up.  Discussed follow up with Neurology - declined.  Currently patient denies symptoms.    3. Hyperlipidemia LDL goal <100   Tolerating statin - unsure if taking given LDL not at goal.  Would increase dose to 40 mg once daily.    4. Hypertension goal BP (blood pressure) < 140/90  Controlled.    5. Type 2 diabetes mellitus with other diabetic kidney complication (H)   Non compliant.   Refuses insulin - discussed pathology of diabetes and control and A1C and blood sugars.  Can not tolerate Metformin - has allergy to sulfa.  Discussed only oral alternative is Pioglitazone which has bladder cancer and HF risk.  Patient states understanding and wants oral agent.  Will try low dose and monitor for HF and urinary symptoms.  Follow up with me in 3 months.    - FOOT EXAM  - OPHTHALMOLOGY ADULT REFERRAL  - pioglitazone (ACTOS) 30 MG tablet; Take 1 tablet (30 mg) by mouth daily  Dispense: 30 tablet; Refill: 3  - Basic metabolic panel  - Hemoglobin A1c  - Lipid panel reflex to direct LDL  - TSH with free T4 reflex    6. Type 2 diabetes mellitus with other specified complication (H)     The risks, benefits and treatment options of prescribed medications or other treatments have been discussed with the patient. The patient verbalized their  understanding and should call or follow up if no improvement or if they develop further problems.        - FOOT EXAM  - OPHTHALMOLOGY ADULT REFERRAL  - pioglitazone (ACTOS) 30 MG tablet; Take 1 tablet (30 mg) by mouth daily  Dispense: 30 tablet; Refill: 3  - Basic metabolic panel  - Hemoglobin A1c  - Lipid panel reflex to direct LDL  - TSH with free T4 reflex    7. Major depressive disorder, recurrent episode, moderate (H)  Stable.    8. Obesity due to excess calories, unspecified obesity severity   Binge eating disorder.  Topamax has worked in the past with successful diet changes and weight loss but stopped medication.  Would like to restart.  Discussed diet and exercise.  The risks, benefits and treatment options of prescribed medications or other treatments have been discussed with the patient. The patient verbalized their understanding and should call or follow up if no improvement or if they develop further problems.        - topiramate (TOPAMAX) 25 MG tablet; Take 1 tablet in am and 1 tablet in pm.  Dispense: 60 tablet; Refill: 6  - Basic metabolic panel  - Hemoglobin A1c  - Lipid panel reflex to direct LDL  - TSH with free T4 reflex    9. Binge eating disorder     - topiramate (TOPAMAX) 25 MG tablet; Take 1 tablet in am and 1 tablet in pm.  Dispense: 60 tablet; Refill: 6    10. Encounter for screening mammogram for breast cancer     - *MA Screening Digital Bilateral; Future    Patient Instructions   Start Pioglitazone 30 mg once daily in am.  Recheck labs and follow up Diabetic visit in 3 months.    Hypoglycemia Treatment/Prevention:    Avoid refined foods heavy in sugars or refined (white) flour.  Examples would be baked goods, breads, white pastas, white rice, sugar beverages, alcohol,etc.    If you do eat meals with these ingredients, try to eat smaller amounts and combine them with lean meats or other proteins.  Eating some nuts like almonds or a small amount of non-fat cottage cheese between meals is  often helpful as well.      Spread your calories out throughout the day more.  Instead of 2 or 3 meals, eat the same amount of calories spread out over 5 or 6 meals.      If you become symptomatic, have something to eat like an apple or milk.  This should help resolve the symptoms.    As a general rule, the healthier your lifestyle is and the closer you are to your ideal weight, the less likely you will be to have problems with this.  In some cases, these symptoms can indicate a predisposition to diabetes in the future so it is important to have a fasting blood sugar checked yearly.    SINDHU Gustafson NP  [facility]    Total times spent with patient 40 minutes of which > 50% of the time was spent counseling and coordination of care discussion of diabetes, medications, compliance, risks and side effects, hypoglycemia, treatment plan and follow up

## 2017-08-25 ENCOUNTER — TELEPHONE (OUTPATIENT)
Dept: FAMILY MEDICINE | Facility: CLINIC | Age: 67
End: 2017-08-25

## 2017-08-25 DIAGNOSIS — E11.29 TYPE 2 DIABETES MELLITUS WITH OTHER DIABETIC KIDNEY COMPLICATION (H): ICD-10-CM

## 2017-08-25 DIAGNOSIS — E78.5 HYPERLIPIDEMIA LDL GOAL <100: ICD-10-CM

## 2017-08-25 DIAGNOSIS — E11.69 TYPE 2 DIABETES MELLITUS WITH OTHER SPECIFIED COMPLICATION (H): Primary | ICD-10-CM

## 2017-08-25 DIAGNOSIS — E11.69 TYPE 2 DIABETES MELLITUS WITH OTHER SPECIFIED COMPLICATION (H): ICD-10-CM

## 2017-08-25 RX ORDER — PIOGLITAZONEHYDROCHLORIDE 30 MG/1
15 TABLET ORAL DAILY
Qty: 45 TABLET | Refills: 1 | Status: SHIPPED | OUTPATIENT
Start: 2017-08-25 | End: 2018-01-16

## 2017-08-25 ASSESSMENT — ANXIETY QUESTIONNAIRES: GAD7 TOTAL SCORE: 3

## 2017-08-25 NOTE — TELEPHONE ENCOUNTER
Labs Result notes.     Notes Recorded by La Anderson NP on 8/25/2017 at 8:26 AM  Cholesterol is elevated - is she taking the Pravastatin daily?  If not restart and take daily.  Recheck Lipids in 3 months with Hgb A1C prior to visit/follow up with me in clinic.    Hgb A1C still elevated and above goal.  Prescription for Pioglitazone ordered at 30 mg once daily - can take 1/2 tablet once daily to start.  We will adjust this at next visit if A1C still not at goal.  Did she pick this up yet?  If not we can re-order at 15 mg once daily #30 with 2 refills.  If she has already picked up would cut in 1/2.    Call patient and give clear instructions of above.    SINDHU Gustafson

## 2017-08-25 NOTE — TELEPHONE ENCOUNTER
I have left a message for the pt to return a call to the clinic. I have placed the recommended lab orders.   Fadumo Corral RN

## 2017-08-29 NOTE — TELEPHONE ENCOUNTER
Patient returning call to clinic  Patient notified of providers instructions and recommendations from Result notes 8/25/17:     Notes Recorded by La Anderson NP on 8/25/2017 at 8:26 AM  Cholesterol is elevated - is she taking the Pravastatin daily?  If not restart and take daily.  Recheck Lipids in 3 months with Hgb A1C prior to visit/follow up with me in clinic.    Hgb A1C still elevated and above goal.  Prescription for Pioglitazone ordered at 30 mg once daily - can take 1/2 tablet once daily to start.  We will adjust this at next visit if A1C still not at goal.  Did she pick this up yet?  If not we can re-order at 15 mg once daily #30 with 2 refills.  If she has already picked up would cut in 1/2.    Call patient and give clear instructions of above.    SINDHU Gustafson    Patient is not taking pravastatin   Patient did  Pioglitazone 8/28/17  Patient verbalized understanding of provider's instructions and recommendations and agreed with provider's plan  Patient reported she will start taking pravastatin, pioglitazone cutting them in half, and make Lab Only appt in 3 months    Josefa RO Rn

## 2017-09-19 DIAGNOSIS — E11.69 TYPE 2 DIABETES MELLITUS WITH OTHER SPECIFIED COMPLICATION (H): ICD-10-CM

## 2017-09-19 NOTE — TELEPHONE ENCOUNTER
Accuchek josephine smart view meter      Last Written Prescription Date: 05/10/2017  Last Fill Quantity: 1,  # refills: 0   Last [place] Visit with [facility] or  Health prescribing provider: 08/24/2017        Accu-chek smart view test strips      Last Written Prescription Date: 05/10/2017  Last Fill Quantity: 100,  # refills: 1   Last Office Visit with [facility] or  Health prescribing provider: 08/24/2017        Accu-chek Fastclix lancets      Last Written Prescription Date: 05/10/2017  Last Fill Quantity: 1bix,  # refills: 1   Last Office Visit with [facility] or  Health prescribing provider: 08/24/2017

## 2017-11-15 DIAGNOSIS — F33.1 MAJOR DEPRESSIVE DISORDER, RECURRENT EPISODE, MODERATE (H): ICD-10-CM

## 2017-11-17 RX ORDER — VENLAFAXINE HYDROCHLORIDE 75 MG/1
CAPSULE, EXTENDED RELEASE ORAL
Qty: 270 CAPSULE | Refills: 3 | Status: SHIPPED | OUTPATIENT
Start: 2017-11-17 | End: 2018-12-17

## 2017-12-14 DIAGNOSIS — E11.69 TYPE 2 DIABETES MELLITUS WITH OTHER SPECIFIED COMPLICATION (H): ICD-10-CM

## 2017-12-14 DIAGNOSIS — E78.5 HYPERLIPIDEMIA LDL GOAL <100: ICD-10-CM

## 2017-12-18 RX ORDER — LOSARTAN POTASSIUM 50 MG/1
TABLET ORAL
Qty: 30 TABLET | Refills: 3 | Status: SHIPPED | OUTPATIENT
Start: 2017-12-18 | End: 2018-01-16

## 2017-12-18 NOTE — TELEPHONE ENCOUNTER
Left message to call back. Patient needs to have Lipids and Hemoglobin A1C rechecked per last lab note on 8-24-17. Cozaar medication approved per RN protocol.  YESSENIA Humphreys

## 2017-12-21 RX ORDER — PRAVASTATIN SODIUM 20 MG
TABLET ORAL
Qty: 30 TABLET | Refills: 0 | Status: SHIPPED | OUTPATIENT
Start: 2017-12-21 | End: 2018-01-16

## 2017-12-21 NOTE — TELEPHONE ENCOUNTER
Notes Recorded by La Anderson NP on 8/25/2017 at 8:26 AM  Cholesterol is elevated - is she taking the Pravastatin daily?  If not restart and take daily.  Recheck Lipids in 3 months with Hgb A1C prior to visit/follow up with me in clinic.  Recent Labs   Lab Test  08/24/17   1031  02/11/16   0936  01/15/15   1433  06/04/14   0909   CHOL  212*  170  265*  232*   HDL  37*  37*  45*  38*   LDL  141*  108*  187*  144*   TRIG  171*  125  166*  250*   CHOLHDLRATIO   --    --   5.9*  6.0*       Due for labs,and visit has been notified.   China Brown RNC

## 2018-01-16 ENCOUNTER — OFFICE VISIT (OUTPATIENT)
Dept: FAMILY MEDICINE | Facility: CLINIC | Age: 68
End: 2018-01-16
Payer: COMMERCIAL

## 2018-01-16 VITALS
HEIGHT: 67 IN | WEIGHT: 214.4 LBS | SYSTOLIC BLOOD PRESSURE: 102 MMHG | TEMPERATURE: 98.1 F | BODY MASS INDEX: 33.65 KG/M2 | DIASTOLIC BLOOD PRESSURE: 80 MMHG | HEART RATE: 94 BPM

## 2018-01-16 DIAGNOSIS — F33.1 MAJOR DEPRESSIVE DISORDER, RECURRENT EPISODE, MODERATE (H): ICD-10-CM

## 2018-01-16 DIAGNOSIS — E11.69 TYPE 2 DIABETES MELLITUS WITH OTHER SPECIFIED COMPLICATION, WITHOUT LONG-TERM CURRENT USE OF INSULIN (H): Primary | ICD-10-CM

## 2018-01-16 DIAGNOSIS — E66.09 OBESITY DUE TO EXCESS CALORIES, UNSPECIFIED OBESITY SEVERITY: ICD-10-CM

## 2018-01-16 DIAGNOSIS — G35 MS (MULTIPLE SCLEROSIS) (H): ICD-10-CM

## 2018-01-16 DIAGNOSIS — Z23 NEED FOR PROPHYLACTIC VACCINATION AND INOCULATION AGAINST INFLUENZA: ICD-10-CM

## 2018-01-16 DIAGNOSIS — I10 HYPERTENSION, GOAL BELOW 140/90: ICD-10-CM

## 2018-01-16 DIAGNOSIS — F50.819 BINGE EATING DISORDER: ICD-10-CM

## 2018-01-16 DIAGNOSIS — E03.9 HYPOTHYROIDISM, UNSPECIFIED TYPE: ICD-10-CM

## 2018-01-16 DIAGNOSIS — Z12.31 ENCOUNTER FOR SCREENING MAMMOGRAM FOR BREAST CANCER: ICD-10-CM

## 2018-01-16 DIAGNOSIS — E78.5 HYPERLIPIDEMIA LDL GOAL <100: ICD-10-CM

## 2018-01-16 LAB — HBA1C MFR BLD: 6.7 % (ref 4.3–6)

## 2018-01-16 PROCEDURE — 99214 OFFICE O/P EST MOD 30 MIN: CPT | Mod: 25 | Performed by: NURSE PRACTITIONER

## 2018-01-16 PROCEDURE — 83036 HEMOGLOBIN GLYCOSYLATED A1C: CPT | Performed by: NURSE PRACTITIONER

## 2018-01-16 PROCEDURE — 90662 IIV NO PRSV INCREASED AG IM: CPT | Performed by: NURSE PRACTITIONER

## 2018-01-16 PROCEDURE — G0008 ADMIN INFLUENZA VIRUS VAC: HCPCS | Performed by: NURSE PRACTITIONER

## 2018-01-16 PROCEDURE — 36415 COLL VENOUS BLD VENIPUNCTURE: CPT | Performed by: NURSE PRACTITIONER

## 2018-01-16 RX ORDER — LEVOTHYROXINE SODIUM 175 UG/1
175 TABLET ORAL DAILY
Qty: 90 TABLET | Refills: 3 | Status: SHIPPED | OUTPATIENT
Start: 2018-01-16 | End: 2019-01-17

## 2018-01-16 RX ORDER — PRAVASTATIN SODIUM 20 MG
20 TABLET ORAL DAILY
Qty: 90 TABLET | Refills: 1 | Status: SHIPPED | OUTPATIENT
Start: 2018-01-16 | End: 2019-04-23

## 2018-01-16 RX ORDER — LIRAGLUTIDE 6 MG/ML
1.2 INJECTION SUBCUTANEOUS DAILY
Qty: 6 ML | Refills: 0 | Status: SHIPPED | OUTPATIENT
Start: 2018-01-16 | End: 2019-07-05

## 2018-01-16 RX ORDER — TOPIRAMATE 25 MG/1
TABLET, FILM COATED ORAL
Qty: 60 TABLET | Refills: 6 | Status: SHIPPED | OUTPATIENT
Start: 2018-01-16 | End: 2019-01-17

## 2018-01-16 RX ORDER — LOSARTAN POTASSIUM 50 MG/1
50 TABLET ORAL DAILY
Qty: 90 TABLET | Refills: 1 | Status: SHIPPED | OUTPATIENT
Start: 2018-01-16 | End: 2019-01-17

## 2018-01-16 NOTE — PATIENT INSTRUCTIONS
MY DIABETES TODAY:    1)  Goal A1C is under <8.0  Mine is:      Lab Results   Component Value Date    A1C 8.2 08/24/2017     We will check Hgb A1C today.    2)  Goal LDL (bad cholesterol) under 70  (measured at least yearly)- I am currently at:   Lab Results   Component Value Date     08/24/2017       3)  Goal blood pressure under 130/80- mine was 102/80 today    4)  Take aspirin daily 81 mg once daily.    5)  No tobacco use    Increase Topamax (Topiramate) to 50 mg twice daily.  Discussed side effects.      ACTION PLAN CHANGES FROM TODAY:    Care Plan changes:    See above.    Labs:     I will not need to fast for this lab appointment.    Follow up:    I will follow up with my nurse practioner:  In three months.    I have placed a referral to Care Coordination for coverage of Victoza - or help with covering cost of medications.

## 2018-01-16 NOTE — PROGRESS NOTES
SUBJECTIVE:   Cyndy Wang is a 68 year old female who presents to clinic today for the following health issues:    Diabetes Follow-up      Patient is checking blood sugars: Pt states she has been taking her sugars very randomly throughout the week.She states she averages around 140s.     Diabetic concerns: None     Symptoms of hypoglycemia (low blood sugar): shaky, dizzy, weak     Paresthesias (numbness or burning in feet) or sores: NO, some tingling, she is not sure if that is from her MS.      Date of last diabetic eye exam: She is due.    Lab Results   Component Value Date    A1C 8.2 08/24/2017    A1C 10.5 05/04/2017    A1C 7.0 12/01/2016    A1C 7.9 02/11/2016    A1C 6.1 06/02/2015       Hyperlipidemia Follow-Up      Rate your low fat/cholesterol diet?: fair    Taking statin?  Yes, no muscle aches from statin    Other lipid medications/supplements?:  none    Hypertension Follow-up      Outpatient blood pressures are not being checked.    Low Salt Diet: no added salt    BP Readings from Last 2 Encounters:   01/16/18 102/80   08/24/17 136/82     Hemoglobin A1C (%)   Date Value   08/24/2017 8.2 (H)   05/04/2017 10.5 (H)     LDL Cholesterol Calculated (mg/dL)   Date Value   08/24/2017 141 (H)   02/11/2016 108 (H)         Amount of exercise or physical activity: No structured exercise outside of work.     Problems taking medications regularly: No    Medication side effects: none    Diet: regular (no restrictions)        Hypothyroidism Follow-up    Since last visit, patient describes the following symptoms: Weight stable, no hair loss, no skin changes, no constipation, no loose stools    TSH   Date Value Ref Range Status   08/24/2017 2.25 0.40 - 4.00 mU/L Final   ]        Problem list and histories reviewed & adjusted, as indicated.  Additional history: as documented    Patient Active Problem List   Diagnosis     MS (multiple sclerosis) (H)     Hyperlipidemia LDL goal <100     Vitamin D deficiency disease     Health  Care Home     Advanced directives, counseling/discussion     Generalized anxiety disorder     Hypertension goal BP (blood pressure) < 140/90     Type 2 diabetes mellitus with other diabetic kidney complication     Obesity due to excess calories, unspecified obesity severity     Binge eating disorder     Type 2 diabetes mellitus with other specified complication (H)     Hypertension, goal below 140/90     Hypothyroidism, unspecified type     Major depressive disorder, recurrent episode, moderate (H)     Past Surgical History:   Procedure Laterality Date     REPAIR TENDON FINGER(S) Right 5/22/2015    Procedure: REPAIR TENDON FINGER(S);  Surgeon: Xavier Paulson MD;  Location: [facility] OR       Social History   Substance Use Topics     Smoking status: Former Smoker     Types: Cigarettes     Smokeless tobacco: Never Used      Comment: 1-2 cigarettes occasionally     Alcohol use No     Family History   Problem Relation Age of Onset     Neurologic Disorder Mother      Stroke     HEART DISEASE Maternal Grandfather      RAD Maternal Grandmother      Breast Cancer No family hx of      Cancer - colorectal No family hx of      Melanoma No family hx of          Current Outpatient Prescriptions   Medication Sig Dispense Refill     liraglutide (VICTOZA) 18 MG/3ML soln Inject 1.2 mg Subcutaneous daily 6 mL 0     topiramate (TOPAMAX) 25 MG tablet Take 2 tablet in am and 2 tablet in pm. 60 tablet 6     levothyroxine (SYNTHROID/LEVOTHROID) 175 MCG tablet Take 1 tablet (175 mcg) by mouth daily 90 tablet 3     losartan (COZAAR) 50 MG tablet Take 1 tablet (50 mg) by mouth daily 90 tablet 1     pravastatin (PRAVACHOL) 20 MG tablet Take 1 tablet (20 mg) by mouth daily 90 tablet 1     venlafaxine (EFFEXOR-XR) 75 MG 24 hr capsule TAKE THREE CAPSULES BY MOUTH EVERY  capsule 3     blood glucose (NO BRAND SPECIFIED) lancets standard Use to test blood sugar daily 100 each 1     blood glucose monitoring (NO BRAND SPECIFIED) meter device  kit Use to test blood sugar daily. 1 kit 0     blood glucose monitoring (NO BRAND SPECIFIED) test strip Use to test blood sugars daily 100 strip 1     triamcinolone (KENALOG) 0.1 % cream Apply sparingly to affected area three times daily for 14 days. 15 g 0     insulin pen needle (B-D U/F) 31G X 8 MM Use once daily or as directed. 30 each prn     ACE NOT PRESCRIBED, INTENTIONAL, 1 each ACE Inhibitor not prescribed due to Allergy 0 each 0     glucose blood VI test strips strip by In Vitro route 4 times daily. 100 strip 12     ORDER FOR DME Glucometer, brand as covered by insurance. 1 each 0     ORDER FOR DME Test strips for pt's glucometer, brand as covered by insurance.  Test four times daily and prn. 400 each 4     ORDER FOR DME Lancets.  Four times daily and prn. 400 each 4     aspirin 325 MG EC tablet Take 1 tablet by mouth daily. 100 tablet 3     [DISCONTINUED] pravastatin (PRAVACHOL) 20 MG tablet TAKE ONE TABLET BY MOUTH EVERY DAY 30 tablet 0     [DISCONTINUED] losartan (COZAAR) 50 MG tablet TAKE ONE TABLET BY MOUTH EVERY DAY 30 tablet 3     [DISCONTINUED] topiramate (TOPAMAX) 25 MG tablet Take 1 tablet in am and 1 tablet in pm. 60 tablet 6     [DISCONTINUED] levothyroxine (SYNTHROID/LEVOTHROID) 175 MCG tablet Take 1 tablet (175 mcg) by mouth daily 90 tablet 3     Allergies   Allergen Reactions     Sulfa Drugs Difficulty breathing     Lisinopril Cough     Penicillins Difficulty breathing     Recent Labs   Lab Test  08/24/17   1031  05/04/17   1422  12/01/16   1051  09/08/16   1916   02/11/16   0936   06/02/15   0958  01/15/15   1433   10/10/12   1012   A1C  8.2*  10.5*  7.0*   --    --   7.9*   --   6.1*  7.5*   < >  12.3*   LDL  141*   --    --    --    --   108*   --    --   187*   < >  123   HDL  37*   --    --    --    --   37*   --    --   45*   < >  32*   TRIG  171*   --    --    --    --   125   --    --   166*   < >  378*   ALT   --    --    --   40   --    --    --   26   --    --   95*   CR  0.90   "1.22*  0.95   --    < >  0.84   --   1.01  0.75   < >   --    GFRESTIMATED  62  44*  59*   --    < >  68   --   55*  78   < >   --    GFRESTBLACK  75  53*  71   --    < >  82   --   66  >90   GFR Calc     < >   --    POTASSIUM  4.5  4.2  3.3*   --    < >  3.7   --   3.8  3.7   --    --    TSH  2.25   --    --   2.55   --    --    < >  17.20*  7.97*   < >   --     < > = values in this interval not displayed.      BP Readings from Last 3 Encounters:   01/16/18 102/80   08/24/17 136/82   05/04/17 132/87    Wt Readings from Last 3 Encounters:   01/16/18 214 lb 6.4 oz (97.3 kg)   08/24/17 227 lb (103 kg)   05/04/17 221 lb 3.2 oz (100.3 kg)                  Labs reviewed in EPIC          Reviewed and updated as needed this visit by clinical staffTobacco  Allergies  Med Hx  Surg Hx  Fam Hx  Soc Hx      Reviewed and updated as needed this visit by Provider         ROS:  Constitutional, HEENT, cardiovascular, pulmonary, GI, , musculoskeletal, neuro, skin, endocrine and psych systems are negative, except as otherwise noted.      OBJECTIVE:   /80  Pulse 94  Temp 98.1  F (36.7  C) (Tympanic)  Ht 5' 7\" (1.702 m)  Wt 214 lb 6.4 oz (97.3 kg)  Breastfeeding? No  BMI 33.58 kg/m2  Body mass index is 33.58 kg/(m^2).   Wt Readings from Last 2 Encounters:   01/16/18 214 lb 6.4 oz (97.3 kg)   08/24/17 227 lb (103 kg)       GENERAL: healthy, alert and no distress  EYES: Eyes grossly normal to inspection, PERRL and conjunctivae and sclerae normal  HENT: ear canals and TM's normal, nose and mouth without ulcers or lesions  NECK: no adenopathy, no asymmetry, masses, or scars and thyroid normal to palpation  RESP: lungs clear to auscultation - no rales, rhonchi or wheezes  CV: regular rate and rhythm, normal S1 S2, no S3 or S4, no murmur, click or rub, no peripheral edema and peripheral pulses strong  ABDOMEN: soft, nontender, no hepatosplenomegaly, no masses and bowel sounds normal  MS: no gross " musculoskeletal defects noted, no edema  SKIN: no suspicious lesions or rashes  NEURO: Normal strength and tone, mentation intact and speech normal  PSYCH: mentation appears normal, affect normal/bright    Diagnostic Test Results:  No results found for this or any previous visit (from the past 24 hour(s)).    ASSESSMENT/PLAN:     1. Type 2 diabetes mellitus with other specified complication, without long-term current use of insulin (H)   Not controlled - recheck today.  Has had 20# + weight loss - diet changes.  Taking medications as prescribed - except Actos.    - liraglutide (VICTOZA) 18 MG/3ML soln; Inject 1.2 mg Subcutaneous daily  Dispense: 6 mL; Refill: 0  - losartan (COZAAR) 50 MG tablet; Take 1 tablet (50 mg) by mouth daily  Dispense: 90 tablet; Refill: 1  - CARE COORDINATION REFERRAL  - Lipid panel reflex to direct LDL Non-fasting; Future    2. Major depressive disorder, recurrent episode, moderate (H)   Stable.    3. MS (multiple sclerosis) (H)  No new or changing symptoms.    4. Obesity due to excess calories, unspecified obesity severity  Has had successful weight loss - cutting back on sugar and pop with help of Topamax.    - topiramate (TOPAMAX) 25 MG tablet; Take 2 tablet in am and 2 tablet in pm.  Dispense: 60 tablet; Refill: 6    5. Hypothyroidism, unspecified type     TSH   Date Value Ref Range Status   08/24/2017 2.25 0.40 - 4.00 mU/L Final   ]    - levothyroxine (SYNTHROID/LEVOTHROID) 175 MCG tablet; Take 1 tablet (175 mcg) by mouth daily  Dispense: 90 tablet; Refill: 3    6. Hypertension, goal below 140/90       7. Hyperlipidemia LDL goal <100     - pravastatin (PRAVACHOL) 20 MG tablet; Take 1 tablet (20 mg) by mouth daily  Dispense: 90 tablet; Refill: 1  - Lipid panel reflex to direct LDL Non-fasting; Future    8. Binge eating disorder     - topiramate (TOPAMAX) 25 MG tablet; Take 2 tablet in am and 2 tablet in pm.  Dispense: 60 tablet; Refill: 6    9. Encounter for screening mammogram for  breast cancer     - *MA Screening Digital Bilateral; Future    10. Type 2 diabetes mellitus with other specified complication (H)     - **A1C FUTURE 1yr    11. Need for prophylactic vaccination and inoculation against influenza     - FLU VACCINE, INCREASED ANTIGEN, PRESV FREE, AGE 65+ [11206]  - Vaccine Administration, Initial [34548]    Patient Instructions     MY DIABETES TODAY:    1)  Goal A1C is under <8.0  Mine is:      Lab Results   Component Value Date    A1C 8.2 08/24/2017     We will check Hgb A1C today.    2)  Goal LDL (bad cholesterol) under 70  (measured at least yearly)- I am currently at:   Lab Results   Component Value Date     08/24/2017       3)  Goal blood pressure under 130/80- mine was 102/80 today    4)  Take aspirin daily 81 mg once daily.    5)  No tobacco use    Increase Topamax (Topiramate) to 50 mg twice daily.  Discussed side effects.      ACTION PLAN CHANGES FROM TODAY:    Care Plan changes:    See above.    Labs:     I will not need to fast for this lab appointment.    Follow up:    I will follow up with my nurse practioner:  In three months.    I have placed a referral to Care Coordination for coverage of Victoza - or help with covering cost of medications.        La Anderson, DK  [facility]

## 2018-01-16 NOTE — MR AVS SNAPSHOT
After Visit Summary   1/16/2018    Cyndy Wang    MRN: [id number]           Patient Information     Date Of Birth          1950        Visit Information        Provider Department      1/16/2018 3:20 PM La Anderson NP [facility]        Today's Diagnoses     Type 2 diabetes mellitus with other specified complication, without long-term current use of insulin (H)    -  1    Major depressive disorder, recurrent episode, moderate (H)        MS (multiple sclerosis) (H)        Obesity due to excess calories, unspecified obesity severity        Hypothyroidism, unspecified type        Hypertension, goal below 140/90        Hyperlipidemia LDL goal <100        Binge eating disorder          Care Instructions    MY DIABETES TODAY:    1)  Goal A1C is under <8.0  Mine is:      Lab Results   Component Value Date    A1C 8.2 08/24/2017     We will check Hgb A1C today.    2)  Goal LDL (bad cholesterol) under 70  (measured at least yearly)- I am currently at:   Lab Results   Component Value Date     08/24/2017       3)  Goal blood pressure under 130/80- mine was 102/80 today    4)  Take aspirin daily 81 mg once daily.    5)  No tobacco use    Increase Topamax (Topiramate) to 50 mg twice daily.  Discussed side effects.      ACTION PLAN CHANGES FROM TODAY:    Care Plan changes:    See above.    Labs:     I will not need to fast for this lab appointment.    Follow up:    I will follow up with my nurse practioner:  In three months.    I have placed a referral to Care Coordination for coverage of Victoza - or help with covering cost of medications.            Follow-ups after your visit        Additional Services     CARE COORDINATION REFERRAL       Services are provided by a Care Coordinator for people with complex needs such as: medical, social, or financial troubles.  The Care Coordinator works with the patient and their Primary Care Provider to determine health goals, obtain  "resources, achieve outcomes, and develop care plans that help coordinate the patient's care.     Reason for Referral: Other: Needs assistance with coverage of Diabetes medications and Other Financial Concerns    Provide additional details for Care Coordination to best meet the patient's current needs: financial concerns    Clinical Staff have discussed the Care Coordination Referral with the patient and/or caregiver: yes                  Who to contact     If you have questions or need follow up information about today's clinic visit or your schedule please contact [facility] directly at [phone].  Normal or non-critical lab and imaging results will be communicated to you by Platform[employer], letter or phone within 4 business days after the clinic has received the results. If you do not hear from us within 7 days, please contact the clinic through [employer] or phone. If you have a critical or abnormal lab result, we will notify you by phone as soon as possible.  Submit refill requests through Author[employer] or call your pharmacy and they will forward the refill request to us. Please allow 3 business days for your refill to be completed.          Additional Information About Your Visit        Authorly Information     Authorly lets you send messages to your doctor, view your test results, renew your prescriptions, schedule appointments and more. To sign up, go to [email or web address] . Click on \"Log in\" on the left side of the screen, which will take you to the Welcome page. Then click on \"Sign up Now\" on the right side of the page.     You will be asked to enter the access code listed below, as well as some personal information. Please follow the directions to create your username and password.     Your access code is: [email or web address]  Expires: 2018  3:59 PM     Your access code will  in 90 days. If you need help or a new code, please call your [facility] or [phone].        Care EveryWhere ID     " "This is your Care EveryWhere ID. This could be used by other organizations to access your [place] medical records  [id number]        Your Vitals Were     Pulse Temperature Height Breastfeeding? BMI (Body Mass Index)       94 98.1  F (36.7  C) (Tympanic) 5' 7\" (1.702 m) No 33.58 kg/m2        Blood Pressure from Last 3 Encounters:   01/16/18 102/80   08/24/17 136/82   05/04/17 132/87    Weight from Last 3 Encounters:   01/16/18 214 lb 6.4 oz (97.3 kg)   08/24/17 227 lb (103 kg)   05/04/17 221 lb 3.2 oz (100.3 kg)              We Performed the Following     CARE COORDINATION REFERRAL          Today's Medication Changes          These changes are accurate as of: 1/16/18  3:59 PM.  If you have any questions, ask your nurse or doctor.               Start taking these medicines.        Dose/Directions    liraglutide 18 MG/3ML soln   Commonly known as:  VICTOZA   Used for:  Type 2 diabetes mellitus with other specified complication, without long-term current use of insulin (H)   Started by:  La Anderson NP        Dose:  1.2 mg   Inject 1.2 mg Subcutaneous daily   Quantity:  6 mL   Refills:  0         These medicines have changed or have updated prescriptions.        Dose/Directions    losartan 50 MG tablet   Commonly known as:  COZAAR   This may have changed:  See the new instructions.   Used for:  Type 2 diabetes mellitus with other specified complication, without long-term current use of insulin (H)   Changed by:  La Anderson NP        Dose:  50 mg   Take 1 tablet (50 mg) by mouth daily   Quantity:  90 tablet   Refills:  1       pravastatin 20 MG tablet   Commonly known as:  PRAVACHOL   This may have changed:  See the new instructions.   Used for:  Hyperlipidemia LDL goal <100   Changed by:  La Anderson NP        Dose:  20 mg   Take 1 tablet (20 mg) by mouth daily   Quantity:  90 tablet   Refills:  1       topiramate 25 MG tablet   Commonly known as:  TOPAMAX   This may have changed:  " additional instructions   Used for:  Obesity due to excess calories, unspecified obesity severity, Binge eating disorder   Changed by:  La Anderson, NP        Take 2 tablet in am and 2 tablet in pm.   Quantity:  60 tablet   Refills:  6            Where to get your medicines      These medications [place] [facility] [place] - [address]  [address] [place] [address]     Phone:  [phone]     levothyroxine 175 MCG tablet    liraglutide 18 MG/3ML soln    losartan 50 MG tablet    pravastatin 20 MG tablet    topiramate 25 MG tablet                Primary Care Provider Office Phone # Fax #    La Anderson [phone]       [address] [address]        Equal Access to Services     ALTHEA CARLOS : Hadii tami frazier hadasho Soomaali, waaxda luqadaha, qaybta kaalmada adeegyada, waxcece ojedain haydebra presley. So [facility] [phone].    ATENCIÓN: Si habla español, tiene a martínez disposición servicios gratuitos de asistencia lingüística. [facility] [phone].    We comply with applicable federal civil rights laws and [place] laws. We do not discriminate on the basis of race, color, national origin, age, disability, sex, sexual orientation, or gender identity.            Thank you!     Thank you for choosing [facility]  for your care. Our goal is always to provide you with excellent care. Hearing back from our patients is one way we can continue to improve our services. Please take a few minutes to complete the written survey that you may receive in the mail after your visit with us. Thank you!             Your Updated Medication List - Protect others around you: Learn how to safely use, store and throw away your medicines at www.disposemymeds.org.          This list is accurate as of: 1/16/18  3:59 PM.  Always use your most recent med list.                   Brand Name Dispense Instructions for use Diagnosis    ACE  NOT PRESCRIBED (INTENTIONAL)     0 each    1 each ACE Inhibitor not prescribed due to Allergy        aspirin 325 MG EC tablet     100 tablet    Take 1 tablet by mouth daily.    Type 2 diabetes, HbA1C goal < 8% (H)       blood glucose lancets standard    no brand specified    100 each    Use to test blood sugar daily    Type 2 diabetes mellitus with other specified complication (H)       blood glucose monitoring meter device kit    no brand specified    1 kit    Use to test blood sugar daily.    Type 2 diabetes mellitus with other specified complication (H)       * blood glucose monitoring test strip    no brand specified    100 strip    by In Vitro route 4 times daily.    Type 2 diabetes, HbA1C goal < 8% (H)       * blood glucose monitoring test strip    no brand specified    100 strip    Use to test blood sugars daily    Type 2 diabetes mellitus with other specified complication (H)       insulin pen needle 31G X 8 MM    B-D U/F    30 each    Use once daily or as directed.    Type 2 diabetes mellitus with hyperglycemia, without long-term current use of insulin (H)       levothyroxine 175 MCG tablet    SYNTHROID/LEVOTHROID    90 tablet    Take 1 tablet (175 mcg) by mouth daily    Hypothyroidism, unspecified type       liraglutide 18 MG/3ML soln    VICTOZA    6 mL    Inject 1.2 mg Subcutaneous daily    Type 2 diabetes mellitus with other specified complication, without long-term current use of insulin (H)       losartan 50 MG tablet    COZAAR    90 tablet    Take 1 tablet (50 mg) by mouth daily    Type 2 diabetes mellitus with other specified complication, without long-term current use of insulin (H)       * order for DME     1 each    Glucometer, brand as covered by insurance.    Type 2 diabetes, HbA1C goal < 8% (H)       * order for DME     400 each    Test strips for pt's glucometer, brand as covered by insurance. Test four times daily and prn.    Type 2 diabetes, HbA1C goal < 8% (H)       * order for DME     400  each    Lancets.  Four times daily and prn.    Type 2 diabetes, HbA1C goal < 8% (H)       pravastatin 20 MG tablet    PRAVACHOL    90 tablet    Take 1 tablet (20 mg) by mouth daily    Hyperlipidemia LDL goal <100       topiramate 25 MG tablet    TOPAMAX    60 tablet    Take 2 tablet in am and 2 tablet in pm.    Obesity due to excess calories, unspecified obesity severity, Binge eating disorder       triamcinolone 0.1 % cream    KENALOG    15 g    Apply sparingly to affected area three times daily for 14 days.    Rash and nonspecific skin eruption       venlafaxine 75 MG 24 hr capsule    EFFEXOR-XR    270 capsule    TAKE THREE CAPSULES BY MOUTH EVERY DAY    Major depressive disorder, recurrent episode, moderate (H)       * Notice:  This list has 5 medication(s) that are the same as other medications prescribed for you. Read the directions carefully, and ask your doctor or other care provider to review them with you.

## 2018-01-16 NOTE — NURSING NOTE
"Initial /80  Pulse 94  Temp 98.1  F (36.7  C) (Tympanic)  Ht 5' 7\" (1.702 m)  Wt 214 lb 6.4 oz (97.3 kg)  Breastfeeding? No  BMI 33.58 kg/m2 Estimated body mass index is 33.58 kg/(m^2) as calculated from the following:    Height as of this encounter: 5' 7\" (1.702 m).    Weight as of this encounter: 214 lb 6.4 oz (97.3 kg). .    Hoa Gunter CMA ([facility])  "

## 2018-01-16 NOTE — PROGRESS NOTES

## 2018-01-23 ENCOUNTER — CARE COORDINATION (OUTPATIENT)
Dept: CARE COORDINATION | Facility: CLINIC | Age: 68
End: 2018-01-23

## 2018-01-23 NOTE — LETTER
Health Care Home - Access Care Plan    About Me  Patient Name:  Cyndy Wang    YOB: 1950  Age:                             68 year old   Hank MRN:            [id number] Telephone Information:     Home Phone [phone]   Mobile Not on file.       Address:    [address] [place] [address] [place] [address] Email address:  No e-mail address on record      Emergency Contact(s)  Name Relationship Lgl Grd Work Phone Home Phone Mobile Phone   1. MONICA ESTRADA Friend   [phone]    2. NPP                  Health Maintenance:      My Access Plan  Medical Emergency 911   Questions or concerns during clinic hours Primary Clinic Line, I will call the clinic directly: Primary Clinic: [facility] [phone]   24 Hour Appointment Line [phone] or  [phone]) (toll free)   24 Hour Nurse Line [phone] (toll free)   Questions or concerns outside clinic hours 24 Hour Appointment Line, I will call the after-hours on-call line:   [facility] [phone] or [phone]) (toll-free)   Preferred Urgent Care Preferred Urgent Care: [facility], [phone]   Preferred Hospital Preferred Hospital: [place], [place]  [phone]   Preferred Pharmacy [facility] [address] [facility]     Behavioral Health Crisis Line The National Suicide Prevention Lifeline at 1-314.112.7145 or 911     My Care Team Members  Patient Care Team       Relationship Specialty Notifications Start End    La Anderson, NP PCP - General Family Practice  9/9/13     Phone: [phone] Fax: [phone]         [address] [facility] [address]    Maylin Irwin   Admissions 1/23/18     Phone: [phone] Fax: [phone]            My Medical and Care Information  Problem List   Patient Active Problem List   Diagnosis     MS (multiple sclerosis) (H)     Hyperlipidemia LDL  goal <100     Vitamin D deficiency disease     Health Care Home     Advanced directives, counseling/discussion     Generalized anxiety disorder     Hypertension goal BP (blood pressure) < 140/90     Type 2 diabetes mellitus with other diabetic kidney complication     Obesity due to excess calories, unspecified obesity severity     Binge eating disorder     Type 2 diabetes mellitus with other specified complication (H)     Hypertension, goal below 140/90     Hypothyroidism, unspecified type     Major depressive disorder, recurrent episode, moderate (H)      Current Medications and Allergies:  See printed Medication Report

## 2018-01-23 NOTE — PROGRESS NOTES
Clinic Care Coordination Contact  [facility]/Voicemail    Referral Source: PCP  Clinical Data: Care Coordinator Outreach  Outreach attempted x 1.  Left message on voicemail with call back information and requested return call.  Plan: Care Coordinator mailed out care coordination introduction letter on 1-23-18. Care Coordinator will try to reach patient again in 3-5 business days.    Maylin Wade  Social Work Care Coordinator  [facility]  [phone]

## 2018-01-23 NOTE — LETTER
[facility] CARE COORDINATION  [address]  [place] [address]  [phone]      January 23, 2018    Cyndy Wang  [address]  [facility] [place] [address]      Dear Cyndy,    I am a clinic care coordinator who works with La Anderson NP at [facility]. I recently tried to call, but was unable to reach you. I wanted to introduce myself and provide you with my contact information so that you can call me with questions or concerns about your health care. Below is a description of clinic care coordination and how I can further assist you.     The clinic care coordinator is a registered nurse and/or  who understand the health care system. The goal of clinic care coordination is to help you manage your health and improve access to the [facility] system in the most efficient manner. The registered nurse can assist you in meeting your health care goals by providing education, coordinating services, and strengthening the communication among your providers. The  can assist you with financial, behavioral, psychosocial, chemical dependency, counseling, and/or psychiatric resources.    Please feel free to contact me at [phone], with any questions or concerns. We at [facility] are focused on providing you with the highest-quality healthcare experience possible and that all starts with you.     Sincerely,     Maylin Irwin    Enclosed: I have enclosed a copy of a 24 Hour Access Plan. This has helpful phone numbers for you to call when needed. Please keep this in an easy to access place to use as needed.

## 2018-01-31 NOTE — PROGRESS NOTES
Clinic Care Coordination Contact  [facility]/Voicemail    Referral Source: PCP  Clinical Data: Care Coordinator Outreach  Outreach attempted x 2.  Left message on voicemail with call back information and requested return call.  Plan: Care Coordinator mailed out care coordination introduction letter on 1/23/18. Care Coordinator will do no further outreaches at this time.    Maylin Barry [facility]  Social Work Care Coordinator  [facility]  [phone]

## 2018-02-01 NOTE — PROGRESS NOTES
Clinic Care Coordination Contact  Care Team Conversations    SW received return call from the pt.  SW introduced self, title and role.     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  Pt shared that she lives alone (her cat of 18 years  2 days ago, so pt is sad, understandably)    Pt shared that she initially was going to need assistance with a DM medication, however, after labs & testing, her PCP does not want her to take this medication any longer, which is relieving to the pt as the drug was very expensive.  Pt said that her co-worker gets the same medication for $3 and wants the same bowen.  SW explained that the pt may have different insurance, may have great insurance, may have state funded insurance.  SW asked the pt if she has ever applied for [employer], Marlee Simpson, [phone], and pt said she has not.  Pt has not applied for MA either, stating that she would rather work for wages than hand over all of her information to the state.    SW explained that there are many programs in our community that can assist the pt, and many of them are income based, so unfortunately, if she doesn't want to share her information, that is understandable, however, she may not become eligible for programs.  Pt states she understands and shared

## 2018-06-11 ENCOUNTER — TELEPHONE (OUTPATIENT)
Dept: FAMILY MEDICINE | Facility: CLINIC | Age: 68
End: 2018-06-11

## 2018-06-11 NOTE — TELEPHONE ENCOUNTER
6/11/2018    Attempt 1    Contacted patient in regards to scheduling VIP mammogram  Message on voicemail     Patient is also due for -     Comments: none      Outreach   Alexa Quiroga

## 2018-06-16 NOTE — TELEPHONE ENCOUNTER
6/16/2018    Attempt 2    Contacted patient in regards to scheduling VIP mammogram  Message on voicemail     Patient is also due for - Preventive Health Screening     Comments:       Outreach   SB

## 2018-08-26 DIAGNOSIS — E78.5 HYPERLIPIDEMIA LDL GOAL <100: ICD-10-CM

## 2018-08-27 RX ORDER — PRAVASTATIN SODIUM 20 MG
TABLET ORAL
Qty: 30 TABLET | Refills: 0 | Status: SHIPPED | OUTPATIENT
Start: 2018-08-27 | End: 2019-01-17

## 2018-08-27 NOTE — TELEPHONE ENCOUNTER
"Requested Prescriptions   Pending Prescriptions Disp Refills     pravastatin (PRAVACHOL) 20 MG tablet [Pharmacy Med Name: PRAVASTATIN SODIUM 20MG TABS]  Last Written Prescription Date:  01/16/18  Last Fill Quantity: 90,  # refills: 1   Last office visit: 1/16/2018 with prescribing provider:  01/16/18   Future Office Visit:     30 tablet 0     Sig: TAKE ONE TABLET BY MOUTH EVERY DAY    Statins Protocol Failed    8/26/2018  1:54 PM       Failed - LDL on file in past 12 months    Recent Labs   Lab Test  08/24/17   1031   LDL  141*          Passed - No abnormal creatine kinase in past 12 months    No lab results found.        Passed - Recent (12 mo) or future (30 days) visit within the authorizing provider's specialty    Patient had office visit in the last 12 months or has a visit in the next 30 days with authorizing provider or within the authorizing provider's specialty.  See \"Patient Info\" tab in inbasket, or \"Choose Columns\" in Meds & Orders section of the refill encounter.           Passed - Patient is age 18 or older       Passed - No active pregnancy on record       Passed - No positive pregnancy test in past 12 months          "

## 2018-10-04 ENCOUNTER — TELEPHONE (OUTPATIENT)
Dept: FAMILY MEDICINE | Facility: CLINIC | Age: 68
End: 2018-10-04

## 2018-10-04 NOTE — LETTER
October 4, 2018      Cyndy Wang  [address] [address]  [facility] [address]        October 4, 2018      Cyndy Wang  [address] [address]  [facility] [address]          Dear Cyndy Kathleen, [phone]    At [facility] we care about your health and are committed to providing quality patient care, which includes staying current on preventative cancer screenings.  You can increase your chances of finding and treating cancers through regular screenings.      Our records show that you are due for the following screening(s):    Mammogram for breast cancer - [phone] to schedule  Recommended every 1-2 years for women age 50 and older  Mammograms help detect breast cancer, which is the most common cancer among women in the [place].  You may need to start having mammograms earlier and more often if you have had breast cancer, breast problems, or a family history of breast cancer.       If you have a My-Chart Account, you also can schedule this appointment through there.    If you have already had one or all of the above screening tests at another facility, please call us so that we may update your chart.                Sincerely,        La Anderson NP

## 2018-10-04 NOTE — TELEPHONE ENCOUNTER
Panel Management Review      Patient has the following on her problem list: None      Composite cancer screening  Chart review shows that this patient is due/due soon for the following Mammogram  Summary:    Patient is due/failing the following:   MAMMOGRAM    Action needed:   Patient needs referral/order: Mammogram    Type of outreach:    Sent letter.    Questions for provider review:    None                                                                                                                                    Hoa Gunter CMA ([facility])

## 2018-12-17 DIAGNOSIS — F33.1 MAJOR DEPRESSIVE DISORDER, RECURRENT EPISODE, MODERATE (H): ICD-10-CM

## 2018-12-17 NOTE — LETTER
December 20, 2018      Cyndy Wang  [address] [address] [address]        Dear Cyndy,     We have been trying to reach you by phone, without success. Your pharmacy has asked us for a refill of your medication: Effexor. Please note at this time we have only provided you with a 30 day supply because you are due for an office visit. Please make an appointment before your medication is out by calling [phone].      Sincerely,        La Anderson NP/ ronald

## 2018-12-18 NOTE — TELEPHONE ENCOUNTER
"Requested Prescriptions   Pending Prescriptions Disp Refills     venlafaxine (EFFEXOR-XR) 75 MG 24 hr capsule [Pharmacy Med Name: VENLAFAXINE HCL ER 75MG CP24] 270 capsule 3    Last Written Prescription Date:  11/17/17  Last Fill Quantity: 270,  # refills: 3   Last office visit: 1/16/2018 with prescribing provider:  Justin   Future Office Visit:     Sig: TAKE THREE CAPSULES BY MOUTH EVERY DAY    Serotonin-Norepinephrine Reuptake Inhibitors  Failed - 12/17/2018  6:37 PM       Failed - PHQ-9 score of less than 5 in past 6 months    Please review last PHQ-9 score.          Failed - Normal serum creatinine on file in past 12 months    Recent Labs   Lab Test 08/24/17  1031   CR 0.90            Failed - Recent (6 mo) or future (30 days) visit within the authorizing provider's specialty    Patient had office visit in the last 6 months or has a visit in the next 30 days with authorizing provider or within the authorizing provider's specialty.  See \"Patient Info\" tab in inbasket, or \"Choose Columns\" in Meds & Orders section of the refill encounter.           Passed - Blood pressure under 140/90 in past 12 months    BP Readings from Last 3 Encounters:   01/16/18 102/80   08/24/17 136/82   05/04/17 132/87                Passed - Patient is age 18 or older       Passed - No active pregnancy on record       Passed - No positive pregnancy test in past 12 months          "

## 2018-12-19 NOTE — TELEPHONE ENCOUNTER
Left message for the patient to call the clinic.  Needs PHQ-9 and ABRAHAM-7 updated.  She will also need a office visit  Zaynab JOHANSEN RN

## 2018-12-20 RX ORDER — VENLAFAXINE HYDROCHLORIDE 75 MG/1
CAPSULE, EXTENDED RELEASE ORAL
Qty: 90 CAPSULE | Refills: 0 | Status: SHIPPED | OUTPATIENT
Start: 2018-12-20 | End: 2019-01-17

## 2018-12-20 NOTE — TELEPHONE ENCOUNTER
Attempt #3. Left message for patient to call back at [phone].    30 day supply provided. Letter sent to patient.  Karen GOLDBERG RN

## 2019-01-09 ENCOUNTER — PATIENT OUTREACH (OUTPATIENT)
Dept: CARE COORDINATION | Facility: CLINIC | Age: 69
End: 2019-01-09

## 2019-01-09 ASSESSMENT — ACTIVITIES OF DAILY LIVING (ADL): DEPENDENT_IADLS:: INDEPENDENT

## 2019-01-10 NOTE — PROGRESS NOTES
Clinic Care Coordination Contact    Clinic Care Coordination Contact  OUTREACH    Referral Information:  Referral Source: Self  Primary Diagnosis: Diabetes    SW received phone call from the pt asking for assistance with resources.  SW asked the pt what type of resources she is looking for & the pt shared that she is looking for a new job, a new start for the new year & wants assistance with cleaning & fixing her home.    Pt explained that a few months ago, the fan on her heater went out, so [employer] came to fix it, but told her the heater was 35 years old and may need to be replaced.   Pt is now worried that several items in her mobile home need maintenance.    SW informed the pt that I will put together a list of community agencies/resources that may be able to provide assistance to her and put it in the mail for her to review.  Pt agrees with this plan.    Chief Complaint   Patient presents with     Clinic Care Coordination - Initial     social work      Medication Management:  Pt states she takes medications as prescribed     Functional Status:  Pt is independent, works a job at [employer] and drives a car.     Living Situation:  Current living arrangement:: I live alone  Type of residence:: Mobile Home    Diet/Exercise/Sleep:  Diet:: Diabetic diet  Inadequate nutrition (GOAL):: No  Food Insecurity: No  Tube Feeding: No  Exercise:: Yes  Days per week of moderate to strenuous exercise (like a brisk walk): 5  On average, minutes per day of exercise at this level: 60  How intense was your typical exercise? : Light (like stretching or slow walking)  Exercise Minutes per Week: 300    Transportation:  Transportation concerns (GOAL):: No  Transportation means:: Regular car     Financial/Insurance:  Social Security of $900 and wages of $800  Financial/Insurance concerns (GOAL):: Yes  Pt is willing to pay for low cost assistance to do maintenance on her home.    Patient/Caregiver understanding: Pt will await the package that  SW is mailing to her and then we will speak again in 2-3 weeks.    Outreach Frequency: monthly  Future Appointments              In 1 week La Anderson NP [facility]        Plan: GODFREY to continue to follow and assist with community resources as needed.    Maylin Wade  Social Work Care Coordinator  [facility]  [phone]

## 2019-01-17 ENCOUNTER — OFFICE VISIT (OUTPATIENT)
Dept: FAMILY MEDICINE | Facility: CLINIC | Age: 69
End: 2019-01-17
Payer: COMMERCIAL

## 2019-01-17 VITALS
WEIGHT: 225.9 LBS | TEMPERATURE: 98.6 F | OXYGEN SATURATION: 97 % | SYSTOLIC BLOOD PRESSURE: 134 MMHG | DIASTOLIC BLOOD PRESSURE: 80 MMHG | RESPIRATION RATE: 16 BRPM | BODY MASS INDEX: 34.24 KG/M2 | HEART RATE: 63 BPM | HEIGHT: 68 IN

## 2019-01-17 DIAGNOSIS — G35 MS (MULTIPLE SCLEROSIS) (H): ICD-10-CM

## 2019-01-17 DIAGNOSIS — E78.5 HYPERLIPIDEMIA LDL GOAL <100: ICD-10-CM

## 2019-01-17 DIAGNOSIS — F50.819 BINGE EATING DISORDER: ICD-10-CM

## 2019-01-17 DIAGNOSIS — E11.69 TYPE 2 DIABETES MELLITUS WITH OTHER SPECIFIED COMPLICATION, WITHOUT LONG-TERM CURRENT USE OF INSULIN (H): ICD-10-CM

## 2019-01-17 DIAGNOSIS — E03.9 HYPOTHYROIDISM, UNSPECIFIED TYPE: ICD-10-CM

## 2019-01-17 DIAGNOSIS — I10 HYPERTENSION, GOAL BELOW 140/90: ICD-10-CM

## 2019-01-17 DIAGNOSIS — F33.1 MAJOR DEPRESSIVE DISORDER, RECURRENT EPISODE, MODERATE (H): ICD-10-CM

## 2019-01-17 DIAGNOSIS — R30.0 DYSURIA: ICD-10-CM

## 2019-01-17 DIAGNOSIS — F41.1 GENERALIZED ANXIETY DISORDER: ICD-10-CM

## 2019-01-17 DIAGNOSIS — E66.09 OBESITY DUE TO EXCESS CALORIES, UNSPECIFIED OBESITY SEVERITY: Primary | ICD-10-CM

## 2019-01-17 LAB
CREAT UR-MCNC: 136 MG/DL
MICROALBUMIN UR-MCNC: 1700 MG/L
MICROALBUMIN/CREAT UR: 1250 MG/G CR (ref 0–25)

## 2019-01-17 PROCEDURE — 99215 OFFICE O/P EST HI 40 MIN: CPT | Performed by: NURSE PRACTITIONER

## 2019-01-17 PROCEDURE — 99207 C FOOT EXAM  NO CHARGE: CPT | Performed by: NURSE PRACTITIONER

## 2019-01-17 PROCEDURE — 82043 UR ALBUMIN QUANTITATIVE: CPT | Performed by: NURSE PRACTITIONER

## 2019-01-17 RX ORDER — PRAVASTATIN SODIUM 20 MG
20 TABLET ORAL DAILY
Qty: 90 TABLET | Refills: 3 | Status: SHIPPED | OUTPATIENT
Start: 2019-01-17 | End: 2020-01-08

## 2019-01-17 RX ORDER — LOSARTAN POTASSIUM 50 MG/1
50 TABLET ORAL DAILY
Qty: 90 TABLET | Refills: 1 | Status: SHIPPED | OUTPATIENT
Start: 2019-01-17 | End: 2019-04-23

## 2019-01-17 RX ORDER — VENLAFAXINE HYDROCHLORIDE 75 MG/1
CAPSULE, EXTENDED RELEASE ORAL
Qty: 270 CAPSULE | Refills: 3 | Status: SHIPPED | OUTPATIENT
Start: 2019-01-17 | End: 2020-01-08

## 2019-01-17 RX ORDER — LEVOTHYROXINE SODIUM 175 UG/1
175 TABLET ORAL DAILY
Qty: 90 TABLET | Refills: 3 | Status: SHIPPED | OUTPATIENT
Start: 2019-01-17 | End: 2019-04-27

## 2019-01-17 RX ORDER — TOPIRAMATE 25 MG/1
TABLET, FILM COATED ORAL
Qty: 120 TABLET | Refills: 11 | Status: SHIPPED | OUTPATIENT
Start: 2019-01-17 | End: 2020-01-08

## 2019-01-17 ASSESSMENT — ANXIETY QUESTIONNAIRES
1. FEELING NERVOUS, ANXIOUS, OR ON EDGE: SEVERAL DAYS
2. NOT BEING ABLE TO STOP OR CONTROL WORRYING: NOT AT ALL
5. BEING SO RESTLESS THAT IT IS HARD TO SIT STILL: NOT AT ALL
GAD7 TOTAL SCORE: 2
IF YOU CHECKED OFF ANY PROBLEMS ON THIS QUESTIONNAIRE, HOW DIFFICULT HAVE THESE PROBLEMS MADE IT FOR YOU TO DO YOUR WORK, TAKE CARE OF THINGS AT HOME, OR GET ALONG WITH OTHER PEOPLE: SOMEWHAT DIFFICULT
3. WORRYING TOO MUCH ABOUT DIFFERENT THINGS: NOT AT ALL
7. FEELING AFRAID AS IF SOMETHING AWFUL MIGHT HAPPEN: NOT AT ALL
6. BECOMING EASILY ANNOYED OR IRRITABLE: SEVERAL DAYS

## 2019-01-17 ASSESSMENT — MIFFLIN-ST. JEOR: SCORE: 1593.18

## 2019-01-17 ASSESSMENT — PATIENT HEALTH QUESTIONNAIRE - PHQ9
SUM OF ALL RESPONSES TO PHQ QUESTIONS 1-9: 4
5. POOR APPETITE OR OVEREATING: NOT AT ALL

## 2019-01-17 NOTE — LETTER
January 23, 2019      Cyndy Wang  [address] [address]  [facility] [address]        Dear ,    We are writing to inform you of your test results.  Below is a copy of what was discussed over the phone regarding your lab test results:    Your microalbumen is elevated. This means you have some protein in the urine. It indicates that your kidneys are being affected by diabetes. Keeping blood pressure and blood fats low is the best treatment in order to keep this  stable. People with microalbumen should avoid anti-inflamatory agents such as motrin, alleve and ibuprofen as much as possible. Anybody that has this should be on lisinopril-as you already are-since this is protective for the kidney's    Restart medications as discussed.  It is VERY important to get your diabetes under control.  Stop pop and start drinking water.      Resulted Orders   Albumin Random Urine Quantitative with Creat Ratio   Result Value Ref Range    Creatinine Urine 136 mg/dL    Albumin Urine mg/L 1,700 mg/L    Albumin Urine mg/g Cr 1,250.00 (H) 0 - 25 mg/g Cr       If you have any questions or concerns, please call the clinic at the number listed above.       Sincerely,        La Anderson NP/brayan

## 2019-01-17 NOTE — PATIENT INSTRUCTIONS
MY DIABETES TODAY:    1)  Goal A1C is under <8.0  Mine is:      Lab Results   Component Value Date    A1C 6.7 01/16/2018       2)  Goal LDL (bad cholesterol) under 100  (measured at least yearly)- I am currently at:   Lab Results   Component Value Date     08/24/2017       3)  Goal blood pressure under 130/80- mine was 134/80 today    4)  Take aspirin daily 81 mg     5)  No tobacco use    Restart medications - ordered these today.  Cut back or stop pop - restart Topiramate (Topamax) 50 mg twice daily to help with this.  Labs today - I will contact you with results and changes if needed.    Monitor diet and work on cutting back on sugary foods, sweets, pop and high fat/fried foods.    Follow up with me in 3 months.    SINDHU Gustafson        ACTION PLAN CHANGES FROM TODAY:    Care Plan changes:    Restart meds    Labs:     I will fast (nothing to eat or drink except water with medications) 12 hours before my lab appointment.    Follow up:    I will follow up with my nurse practioner:  In three months.               Exercise and Diet  Why is exercise important?  Exercise helps keep your blood sugar under control. Many of the people with the best controlled diabetes are those who exercise regularly. Exercise helps in the following ways:  Exercise helps your body burn more sugar. Insulin is more effective during exercise. More sugar and insulin flows in the blood to the muscles during exercise. As a result, your body burns more sugar. Exercise usually helps lower the blood sugar.   Exercise makes you feel better. You will have more energy and tire less easily. Studies have shown that exercise can make you feel healthier and happier by helping you keep normal levels of the brain hormones that affect your mood.   Exercise helps keep the body in good shape. Exercise helps you burn extra calories and keep a normal weight. It keeps your muscles and bones strong.   Exercise helps keep the heart rate and  blood pressure lower. People who exercise have healthier hearts. Their hearts don't have to pump as hard. Normal blood pressure helps prevent strokes and heart, eye, and kidney problems.   Exercise helps keep blood fat levels normal. Many people with diabetes have high levels of blood fats (cholesterol and triglycerides). High blood fats can lead to early aging of blood vessels. Exercise and a healthy diet are the best ways to lower blood fats.   Exercise helps normal blood flow to the feet. Exercise can help you keep good blood flow to your feet. This can help prevent foot problems.  Exercise can be particularly important if you have type 2 diabetes or if you have a high risk of becoming diabetic. If you are overweight, you can lose weight by eating less (particularly less fast food and high-fat food) and exercising more. You may be able to cut your risk of type 2 diabetes by more than half if you keep a normal weight and exercise regularly.  How do I get started?  Make sure you discuss plans for a new exercise program with your healthcare provider before you start.   It is always best to start a new exercise program slowly. Slowly increase how long and how much you exercise.   If you are taking insulin or other diabetes medicines, ask your provider about checking your sugars and adjusting your dose of insulin before and after exercise.   Make exercise a daily routine.   Protect your feet when you exercise. Wear good-fitting shoes and smooth-fitting socks. Check your feet every day and watch for blisters, warm areas or redness. If you have any kind of sore on your foot, see your healthcare provider right away.  Which kinds of exercise are best?  The best exercise is exercise you enjoy. It is easier to form a habit of exercising if you enjoy the activity. Some of the exercise should be aerobic. Only aerobic exercise helps the heart. Some examples are walking, jogging, swimming, and bicycling. Ask your healthcare  provider which exercises and what maximum pulse rate are best for you.  Activities done in short bursts with rests in between (such as weight lifting) are strength-building exercises, not aerobic exercise.  People with diabetes can play almost every sport. Boxing is the only activity that is discouraged. This is because eye injuries are common in boxing, and eye problems are a possible complication of diabetes. Also, the high risk of brain damage makes boxing dangerous for anyone.  Strenuous activities, such as weight lifting and jogging, are discouraged if you have severe eye problems related to diabetes because they increase the pressure in the eyes. If you have eye problems, make sure you talk to your healthcare provider before you start a new activity.  When should I exercise?  The best time to exercise depends on your schedule. If you are taking insulin or other diabetes medicines that can lower blood sugar, you need to take precautions against your blood sugar getting too low when you exercise. Think ahead and make changes in your snacks and doses of insulin or other diabetes medicine to help prevent low blood sugar. Try to pick a regular exercise time and adjust your snacks and medicine dose to fit the exercise. If you are just starting an exercise program, don t exercise too long. Check your blood sugar after 15 minutes and, whether you re still exercising or finished, again at 30 minutes.  How often and long should I exercise?  Ask your healthcare provider to prescribe a plan for starting an exercise program. It should include the type of exercise, how long you should exercise, and how often.   To help your heart stay healthy, it is good to have at least 30 minutes of aerobic exercise 5 or more times a week. The more exercise you get, the more fat you will burn. If weight loss is one of your goals, you may need to exercise harder or for a longer time to reach your goal.  Start each exercise activity with a  warm-up. Do something for 5 to 10 minutes that slowly increases your heart rate, such as walking. Gently stretch your muscles before and after exercise to help prevent cramps and stiffness. Finish your exercise with a cool-down by slowing your activity for 5 to 10 minutes before you stop.  When should I not exercise?  If you have type 1 diabetes, you should avoid vigorous physical activity when your urine or blood test is positive for ketones. If your urine ketone level is high or moderate, exercise can raise your ketone level even more. Check your urine for ketones before exercising if you are not feeling well or your blood sugar is staying higher than 240 milligrams per deciliter (mg/dL), or about 13 millimoles per liter (mmol/L). You can usually still exercise when your blood sugar is high as long as you feel well and there are no ketones in your blood or urine.  Avoid exercising when it is very hot or very cold. Ask your healthcare provider if there are other times when you should not exercise for example, when you feel ill or have a fever.  How can I prevent low blood sugar reactions during exercise?  A low blood sugar (hypoglycemia) could happen during or after exercise. There are several ways to manage your blood sugar and exercise:  Plan to exercise after a light, high-protein snack.   Exercise after a meal, but wait at least 30 minutes after you have eaten so you can digest your food first.   Reduce your insulin dose before exercise.   Take extra snacks with you to help prevent low blood sugar during your exercise.  You will need some practice with adjusting the amount of food you eat before exercise, how long you wait before exercising, and how much you decrease your doses of insulin or other diabetes medicine. You will need to keep good records so you can see what works best. Take these records to your visits with your healthcare provider so you can get help making adjustments.  Here are some things that  "might help.  Often the best time to exercise is 1 to 3 hours after eating a meal.   Check your blood sugar before and after exercise. You may need to eat a carb snack (that is at least 15 grams of carbohydrate) before exercise if your blood sugar is less than 100 mg/dL (5.5 mmol/L). Test your blood sugar 15 to 30 minutes later. Your provider may recommend that you not exercise until your blood sugar is higher than 100 mg/dL (5.5 mmol/L). Your blood sugar may keep being lower than usual for several hours after exercise. Until you learn how your body is reacting to a new exercise program, you should check your blood sugar more often than usual until bedtime, just to be sure it s not getting too low.   Avoid exercising when insulin is working at peak level, which means it is keeping your blood sugar at its lowest level. Your provider can tell you when your type of insulin is at its peak.   Learn how your blood sugar responds to different exercise conditions.   Know what to do if your blood sugar is low or gets low when you are exercising.   Always carry a source of sugar and a longer lasting snack of some carbohydrate and protein, for example, nuts, peanut butter, or cheese and crackers.  Remember, it is wise to think ahead about the day's schedule and plan accordingly. Be sure to ask your healthcare provider if you have any questions about managing your blood sugar levels, your doses of insulin or other diabetes medicines, and the timing of your exercise.    Published by [employer].  This content is reviewed periodically and is subject to change as new health information becomes available. The information is intended to inform and educate and is not a replacement for medical evaluation, advice, diagnosis or treatment by a healthcare professional.  Abstracted from the book, \"Understanding Diabetes,\" 10th Edition, by THOMAS Diggs MD (available by calling [phone]).    2011 [employer] and/or its affiliates. All " rights reserved.

## 2019-01-17 NOTE — PROGRESS NOTES
"Pt. Left clinic without leaving a clean catch urine for UA/UC. Called pt. And left a message to call back or to come back in and leave a urine. A future order was placed.     Ashwini Bernstein, [facility]    SUBJECTIVE:   Cyndy Wang is a 69 year old female who presents to clinic today for the following health issues:      Diabetes Follow-up    Patient is checking blood sugars: once daily.  Results are as follows:         lunchtime - 150    Diabetic concerns: None     Symptoms of hypoglycemia (low blood sugar): shaky, dizzy, weak pt. Knows she needs to eat.      Paresthesias (numbness or burning in feet) or sores: No     Date of last diabetic eye exam: 2 years ago pt. States she is due    Stopped Victoza - because blood sugars were good.  Started back drinking pop again around 8-10 per day.    Started on Topamax 50 mg twice daily and it helped her stop - she stopped this medication.      Diabetes Management Resources    Hyperlipidemia Follow-Up      Rate your low fat/cholesterol diet?: fair    Taking statin?  Yes, no muscle aches from statin    Other lipid medications/supplements?:  None    Stopped Pravastatin - ran out.    Hypertension Follow-up      Outpatient blood pressures are not being checked.    Low Salt Diet: low salt    BP Readings from Last 2 Encounters:   01/16/18 102/80   08/24/17 136/82     Hemoglobin A1C (%)   Date Value   01/16/2018 6.7 (H)   08/24/2017 8.2 (H)     LDL Cholesterol Calculated (mg/dL)   Date Value   08/24/2017 141 (H)   02/11/2016 108 (H)     Depression Followup    Status since last visit: Improved     See PHQ-9 for current symptoms.  Other associated symptoms: None    Complicating factors:   Significant life event:  No   Current substance abuse:  None  Anxiety or Panic symptoms:  No    Still taking Venlafaxine and feels like things are better.  Looking for new job - \"wants a new start\"  Having house problems.  Working with [employer] - and given resources     PHQ 8/24/2017   PHQ-9 Total Score 2   Q9: " Suicide Ideation Not at all     In the past two weeks have you had thoughts of suicide or self-harm?  No.    Do you have concerns about your personal safety or the safety of others?   No  PHQ-9  English  PHQ-9   Any Language  Suicide Assessment Five-step Evaluation and Treatment (SAFE-T)    Amount of exercise or physical activity: very active at work    Problems taking medications regularly: No    Medication side effects: none    Diet: regular (no restrictions)    Problem list and histories reviewed & adjusted, as indicated.  Additional history: as documented    Patient Active Problem List   Diagnosis     MS (multiple sclerosis) (H)     Hyperlipidemia LDL goal <100     Vitamin D deficiency disease     Health Care Home     Advanced directives, counseling/discussion     Generalized anxiety disorder     Type 2 diabetes mellitus with other diabetic kidney complication (H)     Obesity due to excess calories, unspecified obesity severity     Binge eating disorder     Hypertension, goal below 140/90     Hypothyroidism, unspecified type     Major depressive disorder, recurrent episode, moderate (H)     Past Surgical History:   Procedure Laterality Date     REPAIR TENDON FINGER(S) Right 5/22/2015    Procedure: REPAIR TENDON FINGER(S);  Surgeon: Xavier Paulson MD;  Location: [facility] OR       Social History     Tobacco Use     Smoking status: Former Smoker     Types: Cigarettes     Smokeless tobacco: Never Used     Tobacco comment: 1-2 cigarettes occasionally   Substance Use Topics     Alcohol use: No     Alcohol/week: 0.0 oz     Family History   Problem Relation Age of Onset     Neurologic Disorder Mother         Stroke     Heart Disease Maternal Grandfather      RAD Maternal Grandmother      Breast Cancer No family hx of      Cancer - colorectal No family hx of      Melanoma No family hx of          Current Outpatient Medications   Medication Sig Dispense Refill     aspirin 325 MG EC tablet Take 1 tablet by mouth daily.  100 tablet 3     blood glucose (NO BRAND SPECIFIED) lancets standard Use to test blood sugar daily 100 each 1     blood glucose monitoring (NO BRAND SPECIFIED) meter device kit Use to test blood sugar daily. 1 kit 0     blood glucose monitoring (NO BRAND SPECIFIED) test strip Use to test blood sugars daily 100 strip 1     glucose blood VI test strips strip by In Vitro route 4 times daily. 100 strip 12     insulin pen needle (B-D U/F) 31G X 8 MM Use once daily or as directed. 30 each prn     levothyroxine (SYNTHROID/LEVOTHROID) 175 MCG tablet Take 1 tablet (175 mcg) by mouth daily 90 tablet 3     losartan (COZAAR) 50 MG tablet Take 1 tablet (50 mg) by mouth daily 90 tablet 1     ORDER FOR DME Lancets.  Four times daily and prn. 400 each 4     pravastatin (PRAVACHOL) 20 MG tablet TAKE ONE TABLET BY MOUTH EVERY DAY 30 tablet 0     topiramate (TOPAMAX) 25 MG tablet Take 2 tablet in am and 2 tablet in pm. 60 tablet 6     venlafaxine (EFFEXOR-XR) 75 MG 24 hr capsule TAKE THREE CAPSULES BY MOUTH EVERY DAY 90 capsule 0     liraglutide (VICTOZA) 18 MG/3ML soln Inject 1.2 mg Subcutaneous daily (Patient not taking: Reported on 1/17/2019) 6 mL 0     ORDER FOR DME Glucometer, brand as covered by insurance. 1 each 0     ORDER FOR DME Test strips for pt's glucometer, brand as covered by insurance.  Test four times daily and prn. 400 each 4     pravastatin (PRAVACHOL) 20 MG tablet Take 1 tablet (20 mg) by mouth daily (Patient not taking: Reported on 1/17/2019) 90 tablet 1     triamcinolone (KENALOG) 0.1 % cream Apply sparingly to affected area three times daily for 14 days. (Patient not taking: Reported on 1/17/2019) 15 g 0     Allergies   Allergen Reactions     Sulfa Drugs Difficulty breathing     Lisinopril Cough     Penicillins Difficulty breathing     Recent Labs   Lab Test 01/16/18  1608 08/24/17  1031 05/04/17  1422  09/08/16  1916  02/11/16  0936  06/02/15  0958 01/15/15  1433  10/10/12  1012   A1C 6.7* 8.2* 10.5*   < >  --   " --  7.9*  --  6.1* 7.5*   < > 12.3*   LDL  --  141*  --   --   --   --  108*  --   --  187*   < > 123   HDL  --  37*  --   --   --   --  37*  --   --  45*   < > 32*   TRIG  --  171*  --   --   --   --  125  --   --  166*   < > 378*   ALT  --   --   --   --  40  --   --   --  26  --   --  95*   CR  --  0.90 1.22*   < >  --    < > 0.84  --  1.01 0.75   < >  --    GFRESTIMATED  --  62 44*   < >  --    < > 68  --  55* 78   < >  --    GFRESTBLACK  --  75 53*   < >  --    < > 82  --  66 >90   GFR Calc     < >  --    POTASSIUM  --  4.5 4.2   < >  --    < > 3.7  --  3.8 3.7  --   --    TSH  --  2.25  --   --  2.55  --   --    < > 17.20* 7.97*   < >  --     < > = values in this interval not displayed.      BP Readings from Last 3 Encounters:   01/17/19 134/80   01/16/18 102/80   08/24/17 136/82    Wt Readings from Last 3 Encounters:   01/17/19 102.5 kg (225 lb 14.4 oz)   01/16/18 97.3 kg (214 lb 6.4 oz)   08/24/17 103 kg (227 lb)                  Labs reviewed in EPIC    Reviewed and updated as needed this visit by clinical staff       Reviewed and updated as needed this visit by Provider         ROS:  Constitutional, HEENT, cardiovascular, pulmonary, GI, , musculoskeletal, neuro, skin, endocrine and psych systems are negative, except as otherwise noted.    OBJECTIVE:     /80   Pulse 63   Temp 98.6  F (37  C) (Tympanic)   Resp 16   Ht 1.727 m (5' 8\")   Wt 102.5 kg (225 lb 14.4 oz)   SpO2 97%   BMI 34.35 kg/m    Body mass index is 34.35 kg/m .  GENERAL: alert, no distress and over weight  NECK: no adenopathy, no asymmetry, masses, or scars and thyroid normal to palpation  RESP: lungs clear to auscultation - no rales, rhonchi or wheezes  CV: regular rate and rhythm, normal S1 S2, no S3 or S4, no murmur, click or rub, no peripheral edema and peripheral pulses strong  ABDOMEN: soft, nontender, no hepatosplenomegaly, no masses and bowel sounds normal  MS: no gross musculoskeletal defects noted, no " edema  PSYCH: cooperative, normal mentation, hyper verbal, anxious.    Diagnostic Test Results:  Labs ordered for future - not fasting.  UA/microalbumin    ASSESSMENT/PLAN:     1. Major depressive disorder, recurrent episode, moderate (H)   stable - unsure if patient is taking medications continuously - discussed compliance.  Recommended counseling with Lidia   - venlafaxine (EFFEXOR-XR) 75 MG 24 hr capsule; TAKE THREE CAPSULES BY MOUTH EVERY DAY  Dispense: 270 capsule; Refill: 3    2. MS (multiple sclerosis) (H)   Stable - no new symptoms reported.  Recommend daily Vitamin D     - Vitamin B12; Future    3. Type 2 diabetes mellitus with other specified complication, without long-term current use of insulin (H)  Non controlled.  Off Victoza and BS readings when checking are elevated and variable.  Will check labs - fasting - in 2-3 months when back on therapies, diet and medications.    - Albumin Random Urine Quantitative with Creat Ratio  - losartan (COZAAR) 50 MG tablet; Take 1 tablet (50 mg) by mouth daily  Dispense: 90 tablet; Refill: 1  - FOOT EXAM  - OPHTHALMOLOGY ADULT REFERRAL  - Lipid panel reflex to direct LDL Fasting; Future  - ALT; Future  - Basic metabolic panel; Future  - Hemoglobin A1c; Future  - TSH with free T4 reflex; Future  - Vitamin B12; Future    4. Obesity due to excess calories, unspecified obesity severity   Body mass index is 34.35 kg/m .  Did very well on Topamax - restart - given taper up plan.    - topiramate (TOPAMAX) 25 MG tablet; Take 2 tablet in am and 2 tablet in pm.  Dispense: 120 tablet; Refill: 11  - Vitamin B12; Future    5. Hypertension, goal below 140/90   controlled - restart ARB    6. Hypothyroidism, unspecified type  Not on medication - check TSH 6 weeks or so after starting.    - levothyroxine (SYNTHROID/LEVOTHROID) 175 MCG tablet; Take 1 tablet (175 mcg) by mouth daily  Dispense: 90 tablet; Refill: 3  - TSH with free T4 reflex; Future    7. Generalized anxiety disorder        8. Binge eating disorder   The risks, benefits and treatment options of prescribed medications or other treatments have been discussed with the patient. The patient verbalized their understanding and should call or follow up if no improvement or if they develop further problems.    - topiramate (TOPAMAX) 25 MG tablet; Take 2 tablet in am and 2 tablet in pm.  Dispense: 120 tablet; Refill: 11  - Vitamin B12; Future    9. Hyperlipidemia LDL goal <100     - pravastatin (PRAVACHOL) 20 MG tablet; Take 1 tablet (20 mg) by mouth daily  Dispense: 90 tablet; Refill: 3  - Lipid panel reflex to direct LDL Fasting; Future  - ALT; Future    10. Dysuria     - Vitamin B12; Future  - *UA reflex to Microscopic and Culture ([facility] (except [place] and [place]); Future      Total times spent with patient 45 minutes of which > 50% of the time was spent counseling and coordination of care discussion of above chronic conditions, medications, compliance, side effects, recommendations, treatment plan, follow up and labs, referrals.      Patient Instructions              MY DIABETES TODAY:    1)  Goal A1C is under <8.0  Mine is:      Lab Results   Component Value Date    A1C 6.7 01/16/2018       2)  Goal LDL (bad cholesterol) under 100  (measured at least yearly)- I am currently at:   Lab Results   Component Value Date     08/24/2017       3)  Goal blood pressure under 130/80- mine was 134/80 today    4)  Take aspirin daily 81 mg     5)  No tobacco use    Restart medications - ordered these today.  Cut back or stop pop - restart Topiramate (Topamax) 50 mg twice daily to help with this.  Labs today - I will contact you with results and changes if needed.    Monitor diet and work on cutting back on sugary foods, sweets, pop and high fat/fried foods.    Follow up with me in 3 months.    SINDHU Gustafson        ACTION PLAN CHANGES FROM TODAY:    Care Plan changes:    Restart meds    Labs:     I will fast  (nothing to eat or drink except water with medications) 12 hours before my lab appointment.    Follow up:    I will follow up with my nurse practioner:  In three months.               Exercise and Diet  Why is exercise important?  Exercise helps keep your blood sugar under control. Many of the people with the best controlled diabetes are those who exercise regularly. Exercise helps in the following ways:  Exercise helps your body burn more sugar. Insulin is more effective during exercise. More sugar and insulin flows in the blood to the muscles during exercise. As a result, your body burns more sugar. Exercise usually helps lower the blood sugar.   Exercise makes you feel better. You will have more energy and tire less easily. Studies have shown that exercise can make you feel healthier and happier by helping you keep normal levels of the brain hormones that affect your mood.   Exercise helps keep the body in good shape. Exercise helps you burn extra calories and keep a normal weight. It keeps your muscles and bones strong.   Exercise helps keep the heart rate and blood pressure lower. People who exercise have healthier hearts. Their hearts don't have to pump as hard. Normal blood pressure helps prevent strokes and heart, eye, and kidney problems.   Exercise helps keep blood fat levels normal. Many people with diabetes have high levels of blood fats (cholesterol and triglycerides). High blood fats can lead to early aging of blood vessels. Exercise and a healthy diet are the best ways to lower blood fats.   Exercise helps normal blood flow to the feet. Exercise can help you keep good blood flow to your feet. This can help prevent foot problems.  Exercise can be particularly important if you have type 2 diabetes or if you have a high risk of becoming diabetic. If you are overweight, you can lose weight by eating less (particularly less fast food and high-fat food) and exercising more. You may be able to cut your risk  of type 2 diabetes by more than half if you keep a normal weight and exercise regularly.  How do I get started?  Make sure you discuss plans for a new exercise program with your healthcare provider before you start.   It is always best to start a new exercise program slowly. Slowly increase how long and how much you exercise.   If you are taking insulin or other diabetes medicines, ask your provider about checking your sugars and adjusting your dose of insulin before and after exercise.   Make exercise a daily routine.   Protect your feet when you exercise. Wear good-fitting shoes and smooth-fitting socks. Check your feet every day and watch for blisters, warm areas or redness. If you have any kind of sore on your foot, see your healthcare provider right away.  Which kinds of exercise are best?  The best exercise is exercise you enjoy. It is easier to form a habit of exercising if you enjoy the activity. Some of the exercise should be aerobic. Only aerobic exercise helps the heart. Some examples are walking, jogging, swimming, and bicycling. Ask your healthcare provider which exercises and what maximum pulse rate are best for you.  Activities done in short bursts with rests in between (such as weight lifting) are strength-building exercises, not aerobic exercise.  People with diabetes can play almost every sport. Boxing is the only activity that is discouraged. This is because eye injuries are common in boxing, and eye problems are a possible complication of diabetes. Also, the high risk of brain damage makes boxing dangerous for anyone.  Strenuous activities, such as weight lifting and jogging, are discouraged if you have severe eye problems related to diabetes because they increase the pressure in the eyes. If you have eye problems, make sure you talk to your healthcare provider before you start a new activity.  When should I exercise?  The best time to exercise depends on your schedule. If you are taking insulin  or other diabetes medicines that can lower blood sugar, you need to take precautions against your blood sugar getting too low when you exercise. Think ahead and make changes in your snacks and doses of insulin or other diabetes medicine to help prevent low blood sugar. Try to pick a regular exercise time and adjust your snacks and medicine dose to fit the exercise. If you are just starting an exercise program, don t exercise too long. Check your blood sugar after 15 minutes and, whether you re still exercising or finished, again at 30 minutes.  How often and long should I exercise?  Ask your healthcare provider to prescribe a plan for starting an exercise program. It should include the type of exercise, how long you should exercise, and how often.   To help your heart stay healthy, it is good to have at least 30 minutes of aerobic exercise 5 or more times a week. The more exercise you get, the more fat you will burn. If weight loss is one of your goals, you may need to exercise harder or for a longer time to reach your goal.  Start each exercise activity with a warm-up. Do something for 5 to 10 minutes that slowly increases your heart rate, such as walking. Gently stretch your muscles before and after exercise to help prevent cramps and stiffness. Finish your exercise with a cool-down by slowing your activity for 5 to 10 minutes before you stop.  When should I not exercise?  If you have type 1 diabetes, you should avoid vigorous physical activity when your urine or blood test is positive for ketones. If your urine ketone level is high or moderate, exercise can raise your ketone level even more. Check your urine for ketones before exercising if you are not feeling well or your blood sugar is staying higher than 240 milligrams per deciliter (mg/dL), or about 13 millimoles per liter (mmol/L). You can usually still exercise when your blood sugar is high as long as you feel well and there are no ketones in your blood or  urine.  Avoid exercising when it is very hot or very cold. Ask your healthcare provider if there are other times when you should not exercise--for example, when you feel ill or have a fever.  How can I prevent low blood sugar reactions during exercise?  A low blood sugar (hypoglycemia) could happen during or after exercise. There are several ways to manage your blood sugar and exercise:  Plan to exercise after a light, high-protein snack.   Exercise after a meal, but wait at least 30 minutes after you have eaten so you can digest your food first.   Reduce your insulin dose before exercise.   Take extra snacks with you to help prevent low blood sugar during your exercise.  You will need some practice with adjusting the amount of food you eat before exercise, how long you wait before exercising, and how much you decrease your doses of insulin or other diabetes medicine. You will need to keep good records so you can see what works best. Take these records to your visits with your healthcare provider so you can get help making adjustments.  Here are some things that might help.  Often the best time to exercise is 1 to 3 hours after eating a meal.   Check your blood sugar before and after exercise. You may need to eat a carb snack (that is at least 15 grams of carbohydrate) before exercise if your blood sugar is less than 100 mg/dL (5.5 mmol/L). Test your blood sugar 15 to 30 minutes later. Your provider may recommend that you not exercise until your blood sugar is higher than 100 mg/dL (5.5 mmol/L). Your blood sugar may keep being lower than usual for several hours after exercise. Until you learn how your body is reacting to a new exercise program, you should check your blood sugar more often than usual until bedtime, just to be sure it s not getting too low.   Avoid exercising when insulin is working at peak level, which means it is keeping your blood sugar at its lowest level. Your provider can tell you when your type  "of insulin is at its peak.   Learn how your blood sugar responds to different exercise conditions.   Know what to do if your blood sugar is low or gets low when you are exercising.   Always carry a source of sugar and a longer lasting snack of some carbohydrate and protein, for example, nuts, peanut butter, or cheese and crackers.  Remember, it is wise to think ahead about the day's schedule and plan accordingly. Be sure to ask your healthcare provider if you have any questions about managing your blood sugar levels, your doses of insulin or other diabetes medicines, and the timing of your exercise.    Published by [employer].  This content is reviewed periodically and is subject to change as new health information becomes available. The information is intended to inform and educate and is not a replacement for medical evaluation, advice, diagnosis or treatment by a healthcare professional.  Abstracted from the book, \"Understanding Diabetes,\" 10th Edition, by THOMAS Diggs MD (available by calling [phone]).    2011 [facility] and/or its affiliates. All rights reserved.                           La Anderson NP  [facility]  "

## 2019-01-18 ASSESSMENT — ANXIETY QUESTIONNAIRES: GAD7 TOTAL SCORE: 2

## 2019-02-07 ENCOUNTER — PATIENT OUTREACH (OUTPATIENT)
Dept: CARE COORDINATION | Facility: CLINIC | Age: 69
End: 2019-02-07

## 2019-02-07 ASSESSMENT — ACTIVITIES OF DAILY LIVING (ADL): DEPENDENT_IADLS:: INDEPENDENT

## 2019-02-07 NOTE — LETTER
Health Care Home - Access Care Plan    About Me:  Patient Name:  Cyndy Wang    YOB: 1950  Age:                           69 year old   Hank MRN:          [id number] Telephone Information:   Home Phone [phone]   Mobile [phone]       Address:    [address] [place] [address] [place] [address] Email address:  No e-mail address on record      Emergency Contact(s)  Name Relationship Lgl Grd Work Phone Home Phone Mobile Phone   1. MONICA ESTRADA Friend   [phone]    2. NPP                  Health Maintenance: Routine Health maintenance Reviewed: Due/Overdue     My Access Plan  Medical Emergency 911   Questions or concerns during clinic hours Primary Clinic Line, I will call the clinic directly: [facility] - [phone]   24 Hour Appointment Line [phone] or  [phone]) (toll free)   24 Hour Nurse Line [phone] (toll free)   Questions or concerns outside clinic hours 24 Hour Appointment Line, I will call the after-hours on-call line:   [facility] [phone] or [phone]) (toll-free)   Preferred Urgent Care [facility] - [phone]   Preferred Hospital [facility] - [phone]   Preferred Pharmacy [facility] [place] - [address] [facility]     Behavioral Health Crisis Line The National Suicide Prevention Lifeline at 1-706.710.3852 or 911           My Care Team Members  Patient Care Team       Relationship Specialty Notifications Start End    La Anderson NP PCP - Assigned PCP   9/15/13     Phone: [phone] Fax: [phone]         [address] [facility] [address]    Maylin Irwin LSW Clinic Care Coordinator Primary Care - [facility] Admissions 1/9/19     Phone: [phone] Fax: [phone]               My Medical and Care Information  Problem List   Patient Active Problem List   Diagnosis     MS  (multiple sclerosis) (H)     Hyperlipidemia LDL goal <100     Vitamin D deficiency disease     Health Care Home     Advanced directives, counseling/discussion     Generalized anxiety disorder     Type 2 diabetes mellitus with other diabetic kidney complication (H)     Obesity due to excess calories, unspecified obesity severity     Binge eating disorder     Hypertension, goal below 140/90     Hypothyroidism, unspecified type     Major depressive disorder, recurrent episode, moderate (H)      Current Medications and Allergies:  See printed Medication Report

## 2019-02-07 NOTE — LETTER
[facility]ARE COORDINATION  [address]  [place] [address]  [phone]      February 7, 2019    Cyndy Wang  [address] [address] [facility] [address]  [facility] [place] [address]      Dear Cyndy,    I am a clinic care coordinator who works with La Anderson NP at the [facility].  I recently tried to call and was unable to reach you, but wanted to provide you with my contact information so that you can call me with questions or concerns about your health care. Below is a description of clinic care coordination and how I can further assist you.     The clinic care coordinator is a registered nurse and/or  who understand the health care system. The goal of clinic care coordination is to help you manage your health and improve access to the [facility] system in the most efficient manner. The registered nurse can assist you in meeting your health care goals by providing education, coordinating services, and strengthening the communication among your providers. The  can assist you with financial, behavioral, psychosocial, chemical dependency, counseling, and/or psychiatric resources.    Please feel free to contact me at [phone], with any questions or concerns. We at [facility] are focused on providing you with the highest-quality healthcare experience possible and that all starts with you.     Sincerely,     Maylin Irwin    Enclosed: I have enclosed a copy of a 24 Hour Access Plan. This has helpful phone numbers for you to call when needed. Please keep this in an easy to access place to use as needed.

## 2019-02-07 NOTE — PROGRESS NOTES
Clinic Care Coordination Contact  [facility]/Voicemail    Referral Source: Self-patient/Caregiver  Clinical Data: Care Coordinator Outreach  Outreach attempted x 1.  Left message on voicemail with call back information and requested return call.  Plan: Care Coordinator mailed out care coordination introduction letter on 2-7-19. Care Coordinator will try to reach patient again in 5-15 business days.    Maylin Wade  Social Work Care Coordinator  [facility]  [phone]

## 2019-02-27 ENCOUNTER — MEDICAL CORRESPONDENCE (OUTPATIENT)
Dept: HEALTH INFORMATION MANAGEMENT | Facility: CLINIC | Age: 69
End: 2019-02-27

## 2019-02-27 ENCOUNTER — PATIENT OUTREACH (OUTPATIENT)
Dept: CARE COORDINATION | Facility: CLINIC | Age: 69
End: 2019-02-27

## 2019-02-27 ASSESSMENT — ACTIVITIES OF DAILY LIVING (ADL): DEPENDENT_IADLS:: INDEPENDENT

## 2019-02-27 NOTE — PROGRESS NOTES
Clinic Care Coordination Contact  [facility]/Voicemail    Referral Source: Self-patient/Caregiver  Clinical Data: Care Coordinator Outreach  Outreach attempted x 2.  Left message on voicemail with call back information and requested return call.  Plan: Care Coordinator mailed out care coordination introduction letter on 2-7-19. Care Coordinator will do no further outreaches at this time.    Maylin Wade  Social Work Care Coordinator  [facility]  [phone]

## 2019-03-07 DIAGNOSIS — E11.29 TYPE 2 DIABETES MELLITUS WITH OTHER DIABETIC KIDNEY COMPLICATION (H): Primary | ICD-10-CM

## 2019-03-07 DIAGNOSIS — E11.69 TYPE 2 DIABETES MELLITUS WITH OTHER SPECIFIED COMPLICATION (H): ICD-10-CM

## 2019-03-07 RX ORDER — LOSARTAN POTASSIUM 50 MG/1
50 TABLET ORAL DAILY
Qty: 30 TABLET | Refills: 0 | Status: SHIPPED | OUTPATIENT
Start: 2019-03-07 | End: 2020-01-01

## 2019-03-07 NOTE — TELEPHONE ENCOUNTER
"Routing refill request to provider for review/approval because:  Labs not current:  See below  Last OV 1/17/19 with Justin pt instructed to follow up in 3 months     Requested Prescriptions   Pending Prescriptions Disp Refills     losartan (COZAAR) 50 MG tablet [Pharmacy Med Name: LOSARTAN POTASSIUM 50MG TABS] 30 tablet 3     Sig: TAKE ONE TABLET BY MOUTH EVERY DAY    Angiotensin-II Receptors Failed - 3/7/2019  4:19 PM       Failed - Normal serum creatinine on file in past 12 months    Recent Labs   Lab Test 08/24/17  1031   CR 0.90            Failed - Normal serum potassium on file in past 12 months    Recent Labs   Lab Test 08/24/17  1031   POTASSIUM 4.5                   Passed - Blood pressure under 140/90 in past 12 months    BP Readings from Last 3 Encounters:   01/17/19 134/80   01/16/18 102/80   08/24/17 136/82                Passed - Recent (12 mo) or future (30 days) visit within the authorizing provider's specialty    Patient had office visit in the last 12 months or has a visit in the next 30 days with authorizing provider or within the authorizing provider's specialty.  See \"Patient Info\" tab in inbasket, or \"Choose Columns\" in Meds & Orders section of the refill encounter.             Passed - Medication is active on med list       Passed - Patient is age 18 or older       Passed - No active pregnancy on record       Passed - No positive pregnancy test in past 12 months          "

## 2019-03-11 DIAGNOSIS — R30.0 DYSURIA: ICD-10-CM

## 2019-03-11 LAB
ALBUMIN UR-MCNC: NEGATIVE MG/DL
APPEARANCE UR: CLEAR
BILIRUB UR QL STRIP: NEGATIVE
COLOR UR AUTO: YELLOW
GLUCOSE UR STRIP-MCNC: NEGATIVE MG/DL
HGB UR QL STRIP: NEGATIVE
KETONES UR STRIP-MCNC: NEGATIVE MG/DL
LEUKOCYTE ESTERASE UR QL STRIP: NEGATIVE
NITRATE UR QL: NEGATIVE
PH UR STRIP: 5.5 PH (ref 5–7)
SOURCE: NORMAL
SP GR UR STRIP: 1.01 (ref 1–1.03)
UROBILINOGEN UR STRIP-ACNC: 0.2 EU/DL (ref 0.2–1)

## 2019-03-11 PROCEDURE — 81003 URINALYSIS AUTO W/O SCOPE: CPT | Performed by: NURSE PRACTITIONER

## 2019-04-03 ENCOUNTER — TELEPHONE (OUTPATIENT)
Dept: FAMILY MEDICINE | Facility: CLINIC | Age: 69
End: 2019-04-03

## 2019-04-03 NOTE — TELEPHONE ENCOUNTER
Contacted pt to verify her request for medications and monitor through [facility].   Laura Mabry on 4/3/2019 at 1:49 PM

## 2019-04-05 ENCOUNTER — MEDICAL CORRESPONDENCE (OUTPATIENT)
Dept: HEALTH INFORMATION MANAGEMENT | Facility: CLINIC | Age: 69
End: 2019-04-05

## 2019-04-05 ENCOUNTER — TELEPHONE (OUTPATIENT)
Dept: FAMILY MEDICINE | Facility: CLINIC | Age: 69
End: 2019-04-05

## 2019-04-10 ENCOUNTER — TELEPHONE (OUTPATIENT)
Dept: FAMILY MEDICINE | Facility: CLINIC | Age: 69
End: 2019-04-10

## 2019-04-19 ENCOUNTER — TELEPHONE (OUTPATIENT)
Dept: FAMILY MEDICINE | Facility: CLINIC | Age: 69
End: 2019-04-19

## 2019-04-19 NOTE — TELEPHONE ENCOUNTER
Call and remind patient she is due for her Diabetes follow up in clinic and labs.    Labs ordered.  SINDHU Gustafson

## 2019-04-23 ENCOUNTER — TELEPHONE (OUTPATIENT)
Dept: FAMILY MEDICINE | Facility: CLINIC | Age: 69
End: 2019-04-23

## 2019-04-23 DIAGNOSIS — E11.69 TYPE 2 DIABETES MELLITUS WITH OTHER SPECIFIED COMPLICATION (H): ICD-10-CM

## 2019-04-23 NOTE — TELEPHONE ENCOUNTER
Per patient [facility] is a scam and she never ordered anything from them.  I have called [facility] and they are a real pharmacy and per them the patient has forgotten that she has spoken to them.  They will cancel her Rx's out and she will not be charged for anything.  I have re-ordered her diabetic supplies for her as requested by the patient today also to our pharmacy. Zaynab JOHANSEN RN

## 2019-04-23 NOTE — TELEPHONE ENCOUNTER
I don't see that we have ever prescribed this for the patient.  Left message for the patient to call the clinic.  Zaynab JOHANSEN RN

## 2019-04-27 DIAGNOSIS — E03.9 HYPOTHYROIDISM, UNSPECIFIED TYPE: ICD-10-CM

## 2019-04-29 NOTE — TELEPHONE ENCOUNTER
"Requested Prescriptions   Pending Prescriptions Disp Refills     levothyroxine (SYNTHROID/LEVOTHROID) 175 MCG tablet [Pharmacy Med Name: LEVOTHYROXIN 175MCG TAB] 90 tablet 3     Sig: TAKE 1 TABLET BY MOUTH ONCE DAILY   Last Written Prescription Date:  1/17/19  Last Fill Quantity: 90 tab,  # refills: 3   Last office visit: 1/17/2019 with prescribing provider:  La Anderson     Future Office Visit:        Thyroid Protocol Failed - 4/27/2019  2:09 PM        Failed - Normal TSH on file in past 12 months     Recent Labs   Lab Test 08/24/17  1031   TSH 2.25              Passed - Patient is 12 years or older        Passed - Recent (12 mo) or future (30 days) visit within the authorizing provider's specialty     Patient had office visit in the last 12 months or has a visit in the next 30 days with authorizing provider or within the authorizing provider's specialty.  See \"Patient Info\" tab in inbasket, or \"Choose Columns\" in Meds & Orders section of the refill encounter.              Passed - Medication is active on med list        Passed - No active pregnancy on record     If patient is pregnant or has had a positive pregnancy test, please check TSH.          Passed - No positive pregnancy test in past 12 months     If patient is pregnant or has had a positive pregnancy test, please check TSH.            "

## 2019-04-30 RX ORDER — LEVOTHYROXINE SODIUM 175 UG/1
TABLET ORAL
Qty: 30 TABLET | Refills: 0 | Status: SHIPPED | OUTPATIENT
Start: 2019-04-30 | End: 2019-07-03

## 2019-04-30 NOTE — TELEPHONE ENCOUNTER
Left message on answering machine for patient to call back.  She is due/  overdue for several labs, including thyroid, and several things on health maint.     I do see she has a refill at [facility] pharmacy ([facility] January 2019 for a year) , if she wants to transfer that to [facility] in [place] she could do that also.     China Brown RNC

## 2019-04-30 NOTE — TELEPHONE ENCOUNTER
Pt called back.  I informed of below.  She will call back to schedule her lab work.    Medication is being filled for 1 time refill only due to:  Patient needs labs , future labs ordered.    Khadra Dennison RN

## 2019-06-26 DIAGNOSIS — E78.5 HYPERLIPIDEMIA LDL GOAL <100: ICD-10-CM

## 2019-06-26 DIAGNOSIS — R30.0 DYSURIA: ICD-10-CM

## 2019-06-26 DIAGNOSIS — E03.9 HYPOTHYROIDISM, UNSPECIFIED TYPE: ICD-10-CM

## 2019-06-26 DIAGNOSIS — E11.69 TYPE 2 DIABETES MELLITUS WITH OTHER SPECIFIED COMPLICATION, WITHOUT LONG-TERM CURRENT USE OF INSULIN (H): ICD-10-CM

## 2019-06-26 DIAGNOSIS — F50.819 BINGE EATING DISORDER: ICD-10-CM

## 2019-06-26 DIAGNOSIS — E66.09 OBESITY DUE TO EXCESS CALORIES, UNSPECIFIED OBESITY SEVERITY: ICD-10-CM

## 2019-06-26 DIAGNOSIS — G35 MS (MULTIPLE SCLEROSIS) (H): ICD-10-CM

## 2019-06-26 LAB
ALT SERPL W P-5'-P-CCNC: 44 U/L (ref 0–50)
ANION GAP SERPL CALCULATED.3IONS-SCNC: 4 MMOL/L (ref 3–14)
BUN SERPL-MCNC: 17 MG/DL (ref 7–30)
CALCIUM SERPL-MCNC: 9.3 MG/DL (ref 8.5–10.1)
CHLORIDE SERPL-SCNC: 106 MMOL/L (ref 94–109)
CHOLEST SERPL-MCNC: 183 MG/DL
CO2 SERPL-SCNC: 25 MMOL/L (ref 20–32)
CREAT SERPL-MCNC: 0.91 MG/DL (ref 0.52–1.04)
GFR SERPL CREATININE-BSD FRML MDRD: 64 ML/MIN/{1.73_M2}
GLUCOSE SERPL-MCNC: 181 MG/DL (ref 70–99)
HBA1C MFR BLD: 9.9 % (ref 0–5.6)
HDLC SERPL-MCNC: 45 MG/DL
LDLC SERPL CALC-MCNC: 102 MG/DL
NONHDLC SERPL-MCNC: 138 MG/DL
POTASSIUM SERPL-SCNC: 4.3 MMOL/L (ref 3.4–5.3)
SODIUM SERPL-SCNC: 135 MMOL/L (ref 133–144)
TRIGL SERPL-MCNC: 178 MG/DL
TSH SERPL DL<=0.005 MIU/L-ACNC: 1.25 MU/L (ref 0.4–4)

## 2019-06-26 PROCEDURE — 84460 ALANINE AMINO (ALT) (SGPT): CPT | Performed by: NURSE PRACTITIONER

## 2019-06-26 PROCEDURE — 82607 VITAMIN B-12: CPT | Performed by: NURSE PRACTITIONER

## 2019-06-26 PROCEDURE — 84443 ASSAY THYROID STIM HORMONE: CPT | Performed by: NURSE PRACTITIONER

## 2019-06-26 PROCEDURE — 80048 BASIC METABOLIC PNL TOTAL CA: CPT | Performed by: NURSE PRACTITIONER

## 2019-06-26 PROCEDURE — 83036 HEMOGLOBIN GLYCOSYLATED A1C: CPT | Performed by: NURSE PRACTITIONER

## 2019-06-26 PROCEDURE — 36415 COLL VENOUS BLD VENIPUNCTURE: CPT | Performed by: NURSE PRACTITIONER

## 2019-06-26 PROCEDURE — 80061 LIPID PANEL: CPT | Performed by: NURSE PRACTITIONER

## 2019-06-27 LAB — VIT B12 SERPL-MCNC: 516 PG/ML (ref 193–986)

## 2019-07-01 DIAGNOSIS — E11.69 TYPE 2 DIABETES MELLITUS WITH OTHER SPECIFIED COMPLICATION, WITHOUT LONG-TERM CURRENT USE OF INSULIN (H): ICD-10-CM

## 2019-07-01 NOTE — TELEPHONE ENCOUNTER
"Requested Prescriptions   Pending Prescriptions Disp Refills     liraglutide (VICTOZA) 18 MG/3ML solution 6 mL 0     Sig: Inject 1.2 mg Subcutaneous daily   Last Written Prescription Date:  1/16/18  Last Fill Quantity: 6ml,  # refills: 0   Last office visit: 1/17/2019 with prescribing provider:  La Anderson     Future Office Visit:        GLP-1 Agonists Protocol Passed - 7/1/2019  5:11 PM        Passed - Blood pressure less than 140/90 in past 6 months     BP Readings from Last 3 Encounters:   01/17/19 134/80   01/16/18 102/80   08/24/17 136/82                 Passed - LDL on file in past 12 months     Recent Labs   Lab Test 06/26/19  1521   *             Passed - Microalbumin on file in past 12 months     Recent Labs   Lab Test 01/17/19  1436   MICROL 1,700   UMALCR 1,250.00*             Passed - HgbA1C in past 3 or 6 months     If HgbA1C is 8 or greater, it needs to be on file within the past 3 months.  If less than 8, must be on file within the past 6 months.     Recent Labs   Lab Test 06/26/19  1521   A1C 9.9*             Passed - Medication is active on med list        Passed - Patient is age 18 or older        Passed - No active pregnancy on record        Passed - Normal serum creatinine on file in past 12 months     Recent Labs   Lab Test 06/26/19  1521   CR 0.91             Passed - No positive pregnancy test in past 12 months        Passed - Recent (6 mo) or future (30 days) visit within the authorizing provider's specialty     Patient had office visit in the last 6 months or has a visit in the next 30 days with authorizing provider.  See \"Patient Info\" tab in inbasket, or \"Choose Columns\" in Meds & Orders section of the refill encounter.              "

## 2019-07-01 NOTE — LETTER
[facility]  [address] [facility] [facility] [address]  Phone: [phone]       July 5, 2019         Cyndy Batistaanson  [address] [place] [address]  [facility] [address]            Dear Cyndy:    We are concerned about your health care.  We recently provided you with medication refills.  Many medications require routine follow-up with your doctor.    Your prescription(s) have been refilled for 30 days so you may have time for the above noted follow-up. Please call to schedule soon so we can assure you have an appointment before your next refills are needed.    Thank you,      SINDHU Gustafson / michael

## 2019-07-03 DIAGNOSIS — E03.9 HYPOTHYROIDISM, UNSPECIFIED TYPE: ICD-10-CM

## 2019-07-05 RX ORDER — LIRAGLUTIDE 6 MG/ML
1.2 INJECTION SUBCUTANEOUS DAILY
Qty: 6 ML | Refills: 0 | Status: SHIPPED | OUTPATIENT
Start: 2019-07-05 | End: 2019-10-14

## 2019-07-05 NOTE — TELEPHONE ENCOUNTER
"Requested Prescriptions   Pending Prescriptions Disp Refills     levothyroxine (SYNTHROID/LEVOTHROID) 175 MCG tablet [Pharmacy Med Name: LEVOTHYROXIN 175MCG TAB] 30 tablet 0     Sig: TAKE 1 TABLET BY MOUTH ONCE DAILY       Thyroid Protocol Passed - 7/3/2019  4:22 PM        Passed - Patient is 12 years or older        Passed - Recent (12 mo) or future (30 days) visit within the authorizing provider's specialty     Patient had office visit in the last 12 months or has a visit in the next 30 days with authorizing provider or within the authorizing provider's specialty.  See \"Patient Info\" tab in inbasket, or \"Choose Columns\" in Meds & Orders section of the refill encounter.              Passed - Medication is active on med list        Passed - Normal TSH on file in past 12 months     Recent Labs   Lab Test 06/26/19  1521   TSH 1.25              Passed - No active pregnancy on record     If patient is pregnant or has had a positive pregnancy test, please check TSH.          Passed - No positive pregnancy test in past 12 months     If patient is pregnant or has had a positive pregnancy test, please check TSH.          Last Written Prescription Date:  4/30/19  Last Fill Quantity: 30,  # refills: 0   Last office visit: 1/17/2019 with prescribing provider:  Justin   Future Office Visit:      "

## 2019-07-08 RX ORDER — LEVOTHYROXINE SODIUM 175 UG/1
TABLET ORAL
Qty: 90 TABLET | Refills: 1 | Status: SHIPPED | OUTPATIENT
Start: 2019-07-08 | End: 2020-01-08

## 2019-07-08 NOTE — TELEPHONE ENCOUNTER
Prescription approved per [facility] Refill Protocol.  China Brown RNC

## 2019-08-08 ENCOUNTER — TELEPHONE (OUTPATIENT)
Dept: FAMILY MEDICINE | Facility: CLINIC | Age: 69
End: 2019-08-08

## 2019-08-08 NOTE — TELEPHONE ENCOUNTER
Panel Management Review      Patient has the following on her problem list:   Patient Active Problem List   Diagnosis     MS (multiple sclerosis) (H)     Hyperlipidemia LDL goal <100     Vitamin D deficiency disease     Health Care Home     Advanced directives, counseling/discussion     Generalized anxiety disorder     Type 2 diabetes mellitus with other diabetic kidney complication (H)     Obesity due to excess calories, unspecified obesity severity     Binge eating disorder     Hypertension, goal below 140/90     Hypothyroidism, unspecified type     Major depressive disorder, recurrent episode, moderate (H)         Composite cancer screening  Chart review shows that this patient is due/due soon for the following Mammogram and Colonoscopy  Summary:    Patient is due/failing the following:   COLONOSCOPY and MAMMOGRAM    Action needed:   Patient needs office visit for mammo and colonoscopy.    Type of outreach:    Sent letter.    Questions for provider review:    None                                                                                                                                    Amy Mack on 8/8/2019 at 1:06 PM        Chart routed to none .

## 2019-08-08 NOTE — LETTER
August 8, 2019      Cyndy Wang  [address] [address]        Dear Cyndy Wang, [phone]    At [facility] we care about your health and are committed to providing quality patient care, which includes staying current on preventative cancer screenings.  You can increase your chances of finding and treating cancers through regular screenings.      Our records show that you are due for the following screening(s):  Colonoscopy for colon cancer - Call [phone] to schedule   Recommended every ten years for everyone age 50 and older  We strongly urge our patient's to consider having a colonoscopy done, which is the best screening test available and only needs to be done every 10 years if normal.      Mammogram for breast cancer - [phone] to schedule  Recommended every 1-2 years for women age 50 and older  Mammograms help detect breast cancer, which is the most common cancer among women in the [place].  You may need to start having mammograms earlier and more often if you have had breast cancer, breast problems, or a family history of breast cancer.     If you have a My-Chart Account, you also can schedule this appointment through there.    If you have already had one or all of the above screening tests at another facility, please call us so that we may update your chart.      Your partners in health,      Quality Committee   [facility]      Sincerely,    La Anderson NP

## 2019-10-14 ENCOUNTER — TELEPHONE (OUTPATIENT)
Dept: FAMILY MEDICINE | Facility: CLINIC | Age: 69
End: 2019-10-14

## 2019-10-14 DIAGNOSIS — E11.69 TYPE 2 DIABETES MELLITUS WITH OTHER SPECIFIED COMPLICATION, WITHOUT LONG-TERM CURRENT USE OF INSULIN (H): ICD-10-CM

## 2019-10-14 NOTE — LETTER
[facility]  [address] [facility] [address]  Phone: [phone]        October 18, 2019      Cyndy Wang                                                                                                                                [address] [place] [address] [address]  [facility] [address]            Dear Ms. Wang,    We are concerned about your health care.  We recently provided you with a medication refill.  Many medications require routine follow-up with your Doctor.      At this time we ask that: You schedule a routine office visit with your physician to follow your Diabetes.   Please bring your meter to your appointment.    Your prescription: Has been refilled for 1 month so you may have time for the above noted follow-up.      Thank you,      SINDHU Gustafson / YESSENIA Mchugh

## 2019-10-14 NOTE — TELEPHONE ENCOUNTER
"Requested Prescriptions   Pending Prescriptions Disp Refills     VICTOZA PEN 18 MG/3ML soln [Pharmacy Med Name: VICTOZA 18MG/3ML SOPN] 6 mL 0     Sig: INJECT 1.2MG UNDER THE SKIN DAILY (NEED TO BE SEEN IN CLINIC FOR FURTHER REFILLS)       GLP-1 Agonists Protocol Failed - 10/14/2019  3:22 PM        Failed - Blood pressure less than 140/90 in past 6 months     BP Readings from Last 3 Encounters:   01/17/19 134/80   01/16/18 102/80   08/24/17 136/82                 Failed - HgbA1C in past 3 or 6 months     If HgbA1C is 8 or greater, it needs to be on file within the past 3 months.  If less than 8, must be on file within the past 6 months.     Recent Labs   Lab Test 06/26/19  1521   A1C 9.9*             Failed - Recent (6 mo) or future (30 days) visit within the authorizing provider's specialty     Patient had office visit in the last 6 months or has a visit in the next 30 days with authorizing provider.  See \"Patient Info\" tab in inbasket, or \"Choose Columns\" in Meds & Orders section of the refill encounter.            Passed - LDL on file in past 12 months     Recent Labs   Lab Test 06/26/19  1521   *             Passed - Microalbumin on file in past 12 months     Recent Labs   Lab Test 01/17/19  1436   MICROL 1,700   UMALCR 1,250.00*             Passed - Medication is active on med list        Passed - Patient is age 18 or older        Passed - No active pregnancy on record        Passed - Normal serum creatinine on file in past 12 months     Recent Labs   Lab Test 06/26/19  1521   CR 0.91             Passed - No positive pregnancy test in past 12 months        Last Written Prescription Date:  7/5/2019  Last Fill Quantity: 6ml,  # refills: 0   Last office visit: 1/17/2019 with prescribing provider:  Justin   Future Office Visit:        "

## 2019-10-17 RX ORDER — LIRAGLUTIDE 6 MG/ML
INJECTION SUBCUTANEOUS
Qty: 6 ML | Refills: 0 | Status: SHIPPED | OUTPATIENT
Start: 2019-10-17 | End: 2019-11-20

## 2019-10-17 NOTE — TELEPHONE ENCOUNTER
Routing refill request to provider for review/approval because:  Sheryl given x1 and patient did not follow up, please advise  Labs not in range:  A1C  Labs not current' bp has not been within last 6 months.  LOV January - overdue for DM recheck.  UTD on labs except A1C - ready.    Left message for patient to return call to clinic.  CSS - please help schedule appt when she returns call.  Lashon RO RN

## 2019-10-18 NOTE — TELEPHONE ENCOUNTER
Attempted to contact patient, no answer, left voice message to call back.    Mailed reminder letter to home. Will close this encounter.     Clinic Station  okay to deliver message if patient calls back.

## 2019-11-20 ENCOUNTER — TELEPHONE (OUTPATIENT)
Dept: FAMILY MEDICINE | Facility: CLINIC | Age: 69
End: 2019-11-20

## 2019-11-20 DIAGNOSIS — E11.69 TYPE 2 DIABETES MELLITUS WITH OTHER SPECIFIED COMPLICATION, WITHOUT LONG-TERM CURRENT USE OF INSULIN (H): ICD-10-CM

## 2019-11-21 NOTE — TELEPHONE ENCOUNTER
"Requested Prescriptions   Pending Prescriptions Disp Refills     VICTOZA PEN 18 MG/3ML soln [Pharmacy Med Name: VICTOZA 18MG/3ML SOPN] 6 mL 0     Sig: INJECT 1.2MG UNDER THE SKIN DAILY (NEED TO BE SEEN IN CLINIC FOR FURTHER REFILLS)       GLP-1 Agonists Protocol Failed - 11/20/2019  3:44 PM        Failed - Blood pressure less than 140/90 in past 6 months     BP Readings from Last 3 Encounters:   01/17/19 134/80   01/16/18 102/80   08/24/17 136/82                 Failed - HgbA1C in past 3 or 6 months     If HgbA1C is 8 or greater, it needs to be on file within the past 3 months.  If less than 8, must be on file within the past 6 months.     Recent Labs   Lab Test 06/26/19  1521   A1C 9.9*             Failed - Recent (6 mo) or future (30 days) visit within the authorizing provider's specialty     Patient had office visit in the last 6 months or has a visit in the next 30 days with authorizing provider.  See \"Patient Info\" tab in inbasket, or \"Choose Columns\" in Meds & Orders section of the refill encounter.            Passed - LDL on file in past 12 months     Recent Labs   Lab Test 06/26/19  1521   *             Passed - Microalbumin on file in past 12 months     Recent Labs   Lab Test 01/17/19  1436   MICROL 1,700   UMALCR 1,250.00*             Passed - Medication is active on med list        Passed - Patient is age 18 or older        Passed - No active pregnancy on record        Passed - Normal serum creatinine on file in past 12 months     Recent Labs   Lab Test 06/26/19  1521   CR 0.91             Passed - No positive pregnancy test in past 12 months        Last Written Prescription Date:  10/17/2019  Last Fill Quantity: 6ml,  # refills: 0   Last office visit: 1/17/2019 with prescribing provider:  Justin   Future Office Visit:      "

## 2019-11-22 RX ORDER — LIRAGLUTIDE 6 MG/ML
INJECTION SUBCUTANEOUS
Qty: 6 ML | Refills: 0 | Status: SHIPPED | OUTPATIENT
Start: 2019-11-22 | End: 2020-01-08

## 2019-11-22 NOTE — TELEPHONE ENCOUNTER
Covering for PCP    Refilled Victoza, patient is due for diabetes follow up appointment and labs    TY Wong CNP

## 2019-11-22 NOTE — TELEPHONE ENCOUNTER
Routing refill request to provider for review/approval because:  Sheryl given x1 and patient did not follow up, please advise  Labs out of range:  See below  Labs not current:  See below  Patient needs to be seen because:  Last OV 1/17/19    Fabienne ARROYO RN

## 2019-11-25 NOTE — TELEPHONE ENCOUNTER
Left message for patient to return call to clinic.    [facility] ok to deliver message below.    Josefa RO Rn

## 2019-11-26 NOTE — TELEPHONE ENCOUNTER
Patient made appointment for 12/30/19 with La Anderson for 12/30/19. Carolina Elias on 11/26/2019 at 10:20 AM

## 2019-12-12 ENCOUNTER — TELEPHONE (OUTPATIENT)
Dept: FAMILY MEDICINE | Facility: CLINIC | Age: 69
End: 2019-12-12

## 2019-12-12 NOTE — TELEPHONE ENCOUNTER
Patient due for diabetes visit and A1C     Have her do prior to visit if able and bring meter.  SINDHU Gustafson

## 2019-12-31 DIAGNOSIS — E11.29 TYPE 2 DIABETES MELLITUS WITH OTHER DIABETIC KIDNEY COMPLICATION (H): ICD-10-CM

## 2019-12-31 NOTE — TELEPHONE ENCOUNTER
"Requested Prescriptions   Pending Prescriptions Disp Refills     losartan (COZAAR) 50 MG tablet [Pharmacy Med Name: LOSARTAN POTASSIUM 50MG TABS] 30 tablet 0     Sig: TAKE ONE TABLET BY MOUTH ONCE DAILY (NEED TO SEE PROVIDER FOR MORE REFILLS)   Last Written Prescription Date:  3/7/19  Last Fill Quantity: 30 tab,  # refills: 0   Last office visit: 1/17/2019 with prescribing provider:  La Anderson     Future Office Visit:   Next 5 appointments (look out 90 days)    Jan 08, 2020 11:00 AM CST  SHORT with La Anderson NP  [facility]  Arrive at: Clinic [facility] [address] [facility] [address]  [phone]             Angiotensin-II Receptors Passed - 12/31/2019  2:17 PM        Passed - Last blood pressure under 140/90 in past 12 months     BP Readings from Last 3 Encounters:   01/17/19 134/80   01/16/18 102/80   08/24/17 136/82                 Passed - Recent (12 mo) or future (30 days) visit within the authorizing provider's specialty     Patient has had an office visit with the authorizing provider or a provider within the authorizing providers department within the previous 12 mos or has a future within next 30 days. See \"Patient Info\" tab in inbasket, or \"Choose Columns\" in Meds & Orders section of the refill encounter.              Passed - Medication is active on med list        Passed - Patient is age 18 or older        Passed - No active pregnancy on record        Passed - Normal serum creatinine on file in past 12 months     Recent Labs   Lab Test 06/26/19  1521   CR 0.91             Passed - Normal serum potassium on file in past 12 months     Recent Labs   Lab Test 06/26/19  1521   POTASSIUM 4.3                    Passed - No positive pregnancy test in past 12 months          "

## 2019-12-31 NOTE — TELEPHONE ENCOUNTER
Routing refill request to provider for review/approval because: A break in medication? Last ordered by Dr. Burris 3/7/19 for #30 with 0 refills.    Pt is scheduled for appt on 1/8/2020.    Fabienne ARROYO RN             57 yo woman with hx of DM, HLD, TIA, Bell's palsy, presenting with resolving L facial numbness/weakness. 57 yo woman with hx of DM, HLD, TIA, Bell's palsy, presenting with fatigue and R facial paresthesias. Exam notable only for R paracervical and shoulder tenderness.  No objective loss of sensation to any modalities. Vitals wnl. Low suspicion for ischemic event. 59 yo woman with hx of DM, HLD, stroke, Bell's palsy, presenting with fatigue, L facial paresthesias in setting of mild headache/neck pain. Exam notable only for L paracervical and shoulder tenderness suggestive of cervicalgia.  No objective loss of sensation to any modalities. Vitals wnl. Low suspicion for ischemic event.

## 2020-01-01 RX ORDER — LOSARTAN POTASSIUM 50 MG/1
TABLET ORAL
Qty: 30 TABLET | Refills: 0 | Status: SHIPPED | OUTPATIENT
Start: 2020-01-01 | End: 2020-01-08

## 2020-01-08 ENCOUNTER — OFFICE VISIT (OUTPATIENT)
Dept: FAMILY MEDICINE | Facility: CLINIC | Age: 70
End: 2020-01-08
Payer: COMMERCIAL

## 2020-01-08 VITALS
SYSTOLIC BLOOD PRESSURE: 138 MMHG | HEIGHT: 67 IN | TEMPERATURE: 96.1 F | DIASTOLIC BLOOD PRESSURE: 80 MMHG | WEIGHT: 222 LBS | OXYGEN SATURATION: 96 % | RESPIRATION RATE: 16 BRPM | BODY MASS INDEX: 34.84 KG/M2 | HEART RATE: 95 BPM

## 2020-01-08 DIAGNOSIS — D17.30 LIPOMA OF SKIN AND SUBCUTANEOUS TISSUE: ICD-10-CM

## 2020-01-08 DIAGNOSIS — E78.5 HYPERLIPIDEMIA LDL GOAL <100: ICD-10-CM

## 2020-01-08 DIAGNOSIS — B35.1 FUNGAL TOENAIL INFECTION: ICD-10-CM

## 2020-01-08 DIAGNOSIS — F33.1 MAJOR DEPRESSIVE DISORDER, RECURRENT EPISODE, MODERATE (H): ICD-10-CM

## 2020-01-08 DIAGNOSIS — E11.29 TYPE 2 DIABETES MELLITUS WITH OTHER DIABETIC KIDNEY COMPLICATION (H): ICD-10-CM

## 2020-01-08 DIAGNOSIS — E03.9 HYPOTHYROIDISM, UNSPECIFIED TYPE: ICD-10-CM

## 2020-01-08 DIAGNOSIS — H90.3 BILATERAL SENSORINEURAL HEARING LOSS: ICD-10-CM

## 2020-01-08 DIAGNOSIS — F41.1 GENERALIZED ANXIETY DISORDER: ICD-10-CM

## 2020-01-08 DIAGNOSIS — G35 MS (MULTIPLE SCLEROSIS) (H): ICD-10-CM

## 2020-01-08 DIAGNOSIS — E11.69 TYPE 2 DIABETES MELLITUS WITH OTHER SPECIFIED COMPLICATION, WITHOUT LONG-TERM CURRENT USE OF INSULIN (H): Primary | ICD-10-CM

## 2020-01-08 DIAGNOSIS — I10 HYPERTENSION, GOAL BELOW 140/90: ICD-10-CM

## 2020-01-08 LAB
ALBUMIN SERPL-MCNC: 3.2 G/DL (ref 3.4–5)
ALP SERPL-CCNC: 235 U/L (ref 40–150)
ALT SERPL W P-5'-P-CCNC: 61 U/L (ref 0–50)
AST SERPL W P-5'-P-CCNC: 47 U/L (ref 0–45)
BILIRUB DIRECT SERPL-MCNC: <0.1 MG/DL (ref 0–0.2)
BILIRUB SERPL-MCNC: 0.3 MG/DL (ref 0.2–1.3)
CHOLEST SERPL-MCNC: 252 MG/DL
HBA1C MFR BLD: 13.2 % (ref 0–5.6)
HDLC SERPL-MCNC: 38 MG/DL
LDLC SERPL CALC-MCNC: ABNORMAL MG/DL
LDLC SERPL DIRECT ASSAY-MCNC: 149 MG/DL
NONHDLC SERPL-MCNC: 214 MG/DL
PROT SERPL-MCNC: 7.6 G/DL (ref 6.8–8.8)
TRIGL SERPL-MCNC: 480 MG/DL

## 2020-01-08 PROCEDURE — 83721 ASSAY OF BLOOD LIPOPROTEIN: CPT | Performed by: NURSE PRACTITIONER

## 2020-01-08 PROCEDURE — 80061 LIPID PANEL: CPT | Performed by: NURSE PRACTITIONER

## 2020-01-08 PROCEDURE — 99214 OFFICE O/P EST MOD 30 MIN: CPT | Performed by: NURSE PRACTITIONER

## 2020-01-08 PROCEDURE — 82043 UR ALBUMIN QUANTITATIVE: CPT | Performed by: NURSE PRACTITIONER

## 2020-01-08 PROCEDURE — 80076 HEPATIC FUNCTION PANEL: CPT | Performed by: NURSE PRACTITIONER

## 2020-01-08 PROCEDURE — 83036 HEMOGLOBIN GLYCOSYLATED A1C: CPT | Performed by: NURSE PRACTITIONER

## 2020-01-08 PROCEDURE — 36415 COLL VENOUS BLD VENIPUNCTURE: CPT | Performed by: NURSE PRACTITIONER

## 2020-01-08 RX ORDER — TERBINAFINE HYDROCHLORIDE 250 MG/1
250 TABLET ORAL DAILY
Qty: 90 TABLET | Refills: 0 | Status: SHIPPED | OUTPATIENT
Start: 2020-01-08 | End: 2020-04-15

## 2020-01-08 RX ORDER — LIRAGLUTIDE 6 MG/ML
INJECTION SUBCUTANEOUS
Qty: 6 ML | Refills: 1 | Status: SHIPPED | OUTPATIENT
Start: 2020-01-08 | End: 2020-04-15

## 2020-01-08 RX ORDER — LEVOTHYROXINE SODIUM 175 UG/1
175 TABLET ORAL DAILY
Qty: 90 TABLET | Refills: 1 | Status: SHIPPED | OUTPATIENT
Start: 2020-01-08 | End: 2020-05-13

## 2020-01-08 RX ORDER — VENLAFAXINE HYDROCHLORIDE 75 MG/1
CAPSULE, EXTENDED RELEASE ORAL
Qty: 270 CAPSULE | Refills: 3 | Status: SHIPPED | OUTPATIENT
Start: 2020-01-08 | End: 2021-02-26

## 2020-01-08 RX ORDER — LOSARTAN POTASSIUM 50 MG/1
TABLET ORAL
Qty: 90 TABLET | Refills: 3 | Status: SHIPPED | OUTPATIENT
Start: 2020-01-08 | End: 2021-03-05

## 2020-01-08 RX ORDER — PRAVASTATIN SODIUM 20 MG
20 TABLET ORAL DAILY
Qty: 90 TABLET | Refills: 3 | Status: SHIPPED | OUTPATIENT
Start: 2020-01-08 | End: 2021-03-05

## 2020-01-08 ASSESSMENT — MIFFLIN-ST. JEOR: SCORE: 1563.58

## 2020-01-08 NOTE — PATIENT INSTRUCTIONS
"  Patient Education   Diabetes ABCs  Work with Your Health Care Team to Manage Diabetes!     A1c (hemoglobin A1c test):  Check at least every 6 months.  Goal without diabetes: Less than 6%  Goal with diabetes: Less than 7%, but your doctor may have a different goal for you.  My Goal: ___________________   BG (blood glucose):  Goals without diabetes:  Before meals: 60 to 99 mg/dL  After meals (2 hours): under 140 mg/dL  Goals with diabetes:   Before meals: 80 to 130 mg/dL  After meals: (2 hours): under 180 mg/dL  My Goals:  Before meals: __________  After meals: ___________   Blood pressure:  Check at every doctor visit.   Goal: Less than 140/90    Cholesterol:   Check at least 1 time a year.  Goals for LDL (\"bad\" cholesterol):  With heart disease: Less than 70 mg/dL  Without heart disease: Less than 100 mg/dL   Eyes:   Have a dilated eye exam every year.   Teeth:   See your dentist twice a year. People with diabetes have a higher risk of gum disease.  Smoking:   If you smoke, it's important to quit. Make a plan with help from your health care team.  Weight:   Work towards a healthy weight with help from your diabetes educator or dietitian.  Kidneys:     Check your urine protein 1 time each year.    Protect your kidneys by keeping your blood pressure normal.  Feet:    Check your feet every day for signs of injury, dry skin, cracks, cuts, swelling, or blisters.    Ask your doctor to check your feet at every visit.    Wear shoes and socks that fit well.    Don't go barefoot.  Sex:   Talk about erectile dysfunction (ED) and female sexual dysfunction (FSD) with your doctor.  For informational purposes only. Not to replace the advice of your health care provider. Copyright   2018 [employer]. All rights reserved. Illustration ID [id number]   [id number]  [employer]. Clinically reviewed by [employer]. [employer] [id number] - 10/18.       Patient Education   Sample Meal Plan for Diabetes  Breakfast " (4 carb choices, 60 grams carbohydrate)  Coffee, tea or water  1 cup (8 ounces) skim or 1% milk (1-carb choice)  1 small piece of fresh fruit or 1 cup berries (1-carb choice)  Any one of the following (2-carb choice):    1 slice toast with 1 tablespoon of margarine or peanut butter and   cup of cereal with skim or 1% milk    1 cup cereal with skim or 1% milk    1 egg and 2 slices toast with 1 tablespoon margarine or peanut butter  Lunch (4 carb choices, 60 grams carbohydrate)  Coffee, tea or water  1 cup (8 ounces) skim or 1% milk (1-carb choice)  Small piece fresh fruit or 1 cup berries (1-carb choice)  Raw vegetables (add to sandwich or serve on the side)  Any one of the following (2-carb choice):     [place] (made with 2 slices whole-grain bread)      sandwich with 1 cup soup    2 corn tortillas with meat and vegetables  Dinner (4 carb choices, 60 grams carbohydrate)  Coffee, tea or water  Breast of chicken or pork chop (3 to 4 ounces)  Cooked vegetable  Tossed salad with small amount of low-fat dressing  1 cup (8 ounces) skim or 1% milk (1-carb choice)  1 small piece fruit or   cup canned fruit, packed in juice or light syrup (1-carb choice)  Any one of the following (2-carb choice):    Medium baked potato    1 cup mashed potato    2/3 cup rice or pasta  Snacks (1 or 2 carb choices, 15 to 30 grams carbohydrate)  Any one or two of the following:    Small piece fresh fruit    3 pat cracker squares    1 cup raw vegetables with low-fat dip    1 ounce (12 to 15) baked chips with salsa    Light yogurt (100 calories)    1 cup (8 ounces) skim or 1% milk    3 cups popped popcorn    6 vanilla wafers  For informational purposes only. Not to replace the advice of your health care provider.   Copyright   2007 [employer]. All rights reserved. [employer] [id number] - REV 04/16.

## 2020-01-08 NOTE — PROGRESS NOTES
Subjective     Cyndy Wang is a 69 year old female who presents to clinic today for the following health issues:    HPI   Diabetes Follow-up      How often are you checking your blood sugar? Not at all    What concerns do you have today about your diabetes? Not watching diet     Do you have any of these symptoms? (Select all that apply)  No numbness or tingling in feet.  No redness, sores or blisters on feet.  No complaints of excessive thirst.  No reports of blurry vision.  No significant changes to weight.     Have you had a diabetic eye exam in the last 12 months? No     Pt is not taking the Victoza because she had to monitor herself for a month and she knew she would not do that.     BP Readings from Last 2 Encounters:   01/08/20 138/80   01/17/19 134/80     Hemoglobin A1C (%)   Date Value   01/08/2020 13.2 (H)   06/26/2019 9.9 (H)     LDL Cholesterol Calculated (mg/dL)   Date Value   01/08/2020     Cannot estimate LDL when triglyceride exceeds 400 mg/dL   06/26/2019 102 (H)     LDL Cholesterol Direct (mg/dL)   Date Value   01/08/2020 149 (H)     Hyperlipidemia Follow-Up      Are you regularly taking any medication or supplement to lower your cholesterol?   Yes- pravastatin    Are you having muscle aches or other side effects that you think could be caused by your cholesterol lowering medication?  No    Hypertension Follow-up      Do you check your blood pressure regularly outside of the clinic? No     Are you following a low salt diet? Yes    Are your blood pressures ever more than 140 on the top number (systolic) OR more   than 90 on the bottom number (diastolic), for example 140/90? Yes     Depression and Anxiety Follow-Up    How are you doing with your depression since your last visit? No change really - she states that the winter is difficult    How are you doing with your anxiety since your last visit?  No change    Are you having other symptoms that might be associated with depression or anxiety?  No    Have you had a significant life event? OTHER: she states that she has had friends dying.      Do you have any concerns with your use of alcohol or other drugs? No     Foot fungus      Duration: unknown period of time     Description (location/character/radiation): under the toenails     Intensity:  moderate    Accompanying signs and symptoms: NA    History (similar episodes/previous evaluation): None.    Precipitating or alleviating factors: None    Therapies tried and outcome: None         Social History     Tobacco Use     Smoking status: Former Smoker     Types: Cigarettes     Smokeless tobacco: Never Used     Tobacco comment: 1-2 cigarettes occasionally   Substance Use Topics     Alcohol use: No     Alcohol/week: 0.0 standard drinks     Drug use: No     PHQ 8/24/2017 1/17/2019   PHQ-9 Total Score 2 4   Q9: Thoughts of better off dead/self-harm past 2 weeks Not at all Not at all     ABRAHAM-7 SCORE 1/16/2015 8/24/2017 1/17/2019   Total Score 0 - -   Total Score - 3 2     In the past two weeks have you had thoughts of suicide or self-harm?  No.    Do you have concerns about your personal safety or the safety of others?   No    Suicide Assessment Five-step Evaluation and Treatment (SAFE-T)    Diabetes Management Resources      How many servings of fruits and vegetables do you eat daily?  2-3    On average, how many sweetened beverages do you drink each day (Examples: soda, juice, sweet tea, etc.  Do NOT count diet or artificially sweetened beverages)?   9 Lemonades     How many days per week do you miss taking your medication? 0    Patient Active Problem List   Diagnosis     MS (multiple sclerosis) (H)     Hyperlipidemia LDL goal <100     Vitamin D deficiency disease     Health Care Home     Advanced directives, counseling/discussion     Generalized anxiety disorder     Type 2 diabetes mellitus with other diabetic kidney complication (H)     Obesity due to excess calories, unspecified obesity severity     Binge  eating disorder     Hypertension, goal below 140/90     Hypothyroidism, unspecified type     Major depressive disorder, recurrent episode, moderate (H)     Past Surgical History:   Procedure Laterality Date     REPAIR TENDON FINGER(S) Right 5/22/2015    Procedure: REPAIR TENDON FINGER(S);  Surgeon: Xavier Paulson MD;  Location: [facility]       Social History     Tobacco Use     Smoking status: Former Smoker     Types: Cigarettes     Smokeless tobacco: Never Used     Tobacco comment: 1-2 cigarettes occasionally   Substance Use Topics     Alcohol use: No     Alcohol/week: 0.0 standard drinks     Family History   Problem Relation Age of Onset     Neurologic Disorder Mother         Stroke     Heart Disease Maternal Grandfather      C.A.D. Maternal Grandmother      Breast Cancer No family hx of      Cancer - colorectal No family hx of      Melanoma No family hx of          Current Outpatient Medications   Medication Sig Dispense Refill     aspirin 325 MG EC tablet Take 1 tablet by mouth daily. 100 tablet 3     blood glucose (NO BRAND SPECIFIED) lancets standard 1 each by In Vitro route 3 times daily Use to test blood sugar daily whatever brand her insurance will pay for 100 each 1     blood glucose (NO BRAND SPECIFIED) test strip 1 strip by In Vitro route 3 times daily Use to test blood sugars daily. What ever her insurance will pay for 100 strip 1     blood glucose monitoring (NO BRAND SPECIFIED) meter device kit by In Vitro route 3 times daily Use to test blood sugar daily. What ever her insurance will pay for 1 kit 0     insulin pen needle (B-D U/F) 31G X 8 MM Use once daily or as directed. 30 each prn     levothyroxine (SYNTHROID/LEVOTHROID) 175 MCG tablet Take 1 tablet (175 mcg) by mouth daily 90 tablet 1     liraglutide (VICTOZA PEN) 18 MG/3ML solution INJECT 1.2MG UNDER THE SKIN DAILY 6 mL 1     losartan (COZAAR) 50 MG tablet TAKE ONE TABLET BY MOUTH ONCE DAILY 90 tablet 3     pravastatin (PRAVACHOL) 20 MG tablet  Take 1 tablet (20 mg) by mouth daily 90 tablet 3     terbinafine (LAMISIL) 250 MG tablet Take 1 tablet (250 mg) by mouth daily 90 tablet 0     venlafaxine (EFFEXOR-XR) 75 MG 24 hr capsule TAKE THREE CAPSULES BY MOUTH EVERY  capsule 3     insulin glargine (LANTUS PEN) 100 UNIT/ML pen Inject 8 Units Subcutaneous At Bedtime 3 mL 1     insulin pen needle (31G X 5 MM) 31G X 5 MM miscellaneous Use 1 pen needles daily or as directed. 30 each 3     triamcinolone (KENALOG) 0.1 % cream Apply sparingly to affected area three times daily for 14 days. (Patient not taking: Reported on 1/17/2019) 15 g 0     Allergies   Allergen Reactions     Sulfa Drugs Difficulty breathing     Lisinopril Cough     Penicillins Difficulty breathing     Recent Labs   Lab Test 01/08/20  1150 06/26/19  1521 01/16/18  1608 08/24/17  1031  09/08/16  1916   A1C 13.2* 9.9* 6.7* 8.2*   < >  --    LDL Cannot estimate LDL when triglyceride exceeds 400 mg/dL  149* 102*  --  141*  --   --    HDL 38* 45*  --  37*  --   --    TRIG 480* 178*  --  171*  --   --    ALT 61* 44  --   --   --  40   CR  --  0.91  --  0.90   < >  --    GFRESTIMATED  --  64  --  62   < >  --    GFRESTBLACK  --  74  --  75   < >  --    POTASSIUM  --  4.3  --  4.5   < >  --    TSH  --  1.25  --  2.25  --  2.55    < > = values in this interval not displayed.      BP Readings from Last 3 Encounters:   01/08/20 138/80   01/17/19 134/80   01/16/18 102/80    Wt Readings from Last 3 Encounters:   01/08/20 100.7 kg (222 lb)   01/17/19 102.5 kg (225 lb 14.4 oz)   01/16/18 97.3 kg (214 lb 6.4 oz)                    Reviewed and updated as needed this visit by Provider  Tobacco  Allergies  Meds  Problems  Med Hx  Surg Hx  Fam Hx       Review of Systems   ROS COMP: Constitutional, HEENT, cardiovascular, pulmonary, GI, , musculoskeletal, neuro, skin, endocrine and psych systems are negative, except as otherwise noted.  POS for bilateral hearing loss for years - no trauma reported.     "  Objective    /80 (BP Location: Right arm, Patient Position: Sitting, Cuff Size: Adult Regular)   Pulse 95   Temp 96.1  F (35.6  C) (Tympanic)   Resp 16   Ht 1.708 m (5' 7.25\")   Wt 100.7 kg (222 lb)   SpO2 96%   BMI 34.51 kg/m    Body mass index is 34.51 kg/m .  Physical Exam   GENERAL: healthy, alert and no distress  NECK: no adenopathy, no asymmetry, masses, or scars and thyroid normal to palpation  RESP: lungs clear to auscultation - no rales, rhonchi or wheezes  CV: regular rate and rhythm, normal S1 S2, no S3 or S4, no murmur, click or rub, no peripheral edema and peripheral pulses strong  ABDOMEN: soft, nontender, no hepatosplenomegaly, no masses and bowel sounds normal  MS: no gross musculoskeletal defects noted, no edema  SKIN: no suspicious lesions or rashes and great toe discoloration and thickening of toenails.  NEURO: Normal strength and tone, mentation intact and speech normal  PSYCH: mentation appears normal, affect normal/bright    Diagnostic Test Results:  Labs reviewed in Epic  No results found for this or any previous visit (from the past 24 hour(s)).        Assessment & Plan     1. Type 2 diabetes mellitus with other specified complication, without long-term current use of insulin (H)  Not controlled- stopped medications per patient   Stopped pop but started drinking lemonade.    - liraglutide (VICTOZA PEN) 18 MG/3ML solution; INJECT 1.2MG UNDER THE SKIN DAILY  Dispense: 6 mL; Refill: 1  - Lipid panel reflex to direct LDL Fasting  - Hemoglobin A1c  - LDL cholesterol direct  - LDL cholesterol direct    2. MS (multiple sclerosis) (H)   Stable  No new symptoms    3. Major depressive disorder, recurrent episode, moderate (H)  Stable.    - venlafaxine (EFFEXOR-XR) 75 MG 24 hr capsule; TAKE THREE CAPSULES BY MOUTH EVERY DAY  Dispense: 270 capsule; Refill: 3    4. Hypothyroidism, unspecified type   TSH euthyroid  Continue on same dose of medications.    - levothyroxine " "(SYNTHROID/LEVOTHROID) 175 MCG tablet; Take 1 tablet (175 mcg) by mouth daily  Dispense: 90 tablet; Refill: 1    5. Hyperlipidemia LDL goal <100     - pravastatin (PRAVACHOL) 20 MG tablet; Take 1 tablet (20 mg) by mouth daily  Dispense: 90 tablet; Refill: 3    6. Hypertension, goal below 140/90   Controlled.    7. Generalized anxiety disorder       8. Type 2 diabetes mellitus with other diabetic kidney complication (H)     - losartan (COZAAR) 50 MG tablet; TAKE ONE TABLET BY MOUTH ONCE DAILY  Dispense: 90 tablet; Refill: 3    9. Fungal toenail infection   The risks, benefits and treatment options of prescribed medications or other treatments have been discussed with the patient. The patient verbalized their understanding and should call or follow up if no improvement or if they develop further problems.    - Hepatic panel  - terbinafine (LAMISIL) 250 MG tablet; Take 1 tablet (250 mg) by mouth daily  Dispense: 90 tablet; Refill: 0  - DERMATOLOGY REFERRAL    10. Lipoma of skin and subcutaneous tissue     - DERMATOLOGY REFERRAL    11. Bilateral sensorineural hearing loss     - AUDIOLOGY ADULT REFERRAL  - OTOLARYNGOLOGY REFERRAL     BMI:   Estimated body mass index is 34.51 kg/m  as calculated from the following:    Height as of this encounter: 1.708 m (5' 7.25\").    Weight as of this encounter: 100.7 kg (222 lb).           Patient Instructions       Patient Education   Diabetes ABCs  Work with Your Health Care Team to Manage Diabetes!     A1c (hemoglobin A1c test):  Check at least every 6 months.  Goal without diabetes: Less than 6%  Goal with diabetes: Less than 7%, but your doctor may have a different goal for you.  My Goal: ___________________   BG (blood glucose):  Goals without diabetes:  Before meals: 60 to 99 mg/dL  After meals (2 hours): under 140 mg/dL  Goals with diabetes:   Before meals: 80 to 130 mg/dL  After meals: (2 hours): under 180 mg/dL  My Goals:  Before meals: __________  After meals: ___________ " "  Blood pressure:  Check at every doctor visit.   Goal: Less than 140/90    Cholesterol:   Check at least 1 time a year.  Goals for LDL (\"bad\" cholesterol):  With heart disease: Less than 70 mg/dL  Without heart disease: Less than 100 mg/dL   Eyes:   Have a dilated eye exam every year.   Teeth:   See your dentist twice a year. People with diabetes have a higher risk of gum disease.  Smoking:   If you smoke, it's important to quit. Make a plan with help from your health care team.  Weight:   Work towards a healthy weight with help from your diabetes educator or dietitian.  Kidneys:     Check your urine protein 1 time each year.    Protect your kidneys by keeping your blood pressure normal.  Feet:    Check your feet every day for signs of injury, dry skin, cracks, cuts, swelling, or blisters.    Ask your doctor to check your feet at every visit.    Wear shoes and socks that fit well.    Don't go barefoot.  Sex:   Talk about erectile dysfunction (ED) and female sexual dysfunction (FSD) with your doctor.  For informational purposes only. Not to replace the advice of your health care provider. Copyright   2018 [employer]. All rights reserved. Illustration ID [id number]   [id number]  [employer]. Clinically reviewed by [place] [facility]. idio [id number] - 10/18.       Patient Education   Sample Meal Plan for Diabetes  Breakfast (4 carb choices, 60 grams carbohydrate)  Coffee, tea or water  1 cup (8 ounces) skim or 1% milk (1-carb choice)  1 small piece of fresh fruit or 1 cup berries (1-carb choice)  Any one of the following (2-carb choice):    1 slice toast with 1 tablespoon of margarine or peanut butter and   cup of cereal with skim or 1% milk    1 cup cereal with skim or 1% milk    1 egg and 2 slices toast with 1 tablespoon margarine or peanut butter  Lunch (4 carb choices, 60 grams carbohydrate)  Coffee, tea or water  1 cup (8 ounces) skim or 1% milk (1-carb choice)  Small piece fresh fruit or " 1 cup berries (1-carb choice)  Raw vegetables (add to sandwich or serve on the side)  Any one of the following (2-carb choice):     [place] (made with 2 slices whole-grain bread)      sandwich with 1 cup soup    2 corn tortillas with meat and vegetables  Dinner (4 carb choices, 60 grams carbohydrate)  Coffee, tea or water  Breast of chicken or pork chop (3 to 4 ounces)  Cooked vegetable  Tossed salad with small amount of low-fat dressing  1 cup (8 ounces) skim or 1% milk (1-carb choice)  1 small piece fruit or   cup canned fruit, packed in juice or light syrup (1-carb choice)  Any one of the following (2-carb choice):    Medium baked potato    1 cup mashed potato    2/3 cup rice or pasta  Snacks (1 or 2 carb choices, 15 to 30 grams carbohydrate)  Any one or two of the following:    Small piece fresh fruit    3 pat cracker squares    1 cup raw vegetables with low-fat dip    1 ounce (12 to 15) baked chips with salsa    Light yogurt (100 calories)    1 cup (8 ounces) skim or 1% milk    3 cups popped popcorn    6 vanilla wafers  For informational purposes only. Not to replace the advice of your health care provider.   Copyright   2007 [facility]. All rights reserved. [address] [id number] - REV 04/16.           Return in about 3 months (around 4/8/2020) for Diabetes.    La Anderson NP  [facility]

## 2020-01-09 ENCOUNTER — TELEPHONE (OUTPATIENT)
Dept: FAMILY MEDICINE | Facility: CLINIC | Age: 70
End: 2020-01-09

## 2020-01-09 DIAGNOSIS — E11.29 TYPE 2 DIABETES MELLITUS WITH OTHER DIABETIC KIDNEY COMPLICATION (H): Primary | ICD-10-CM

## 2020-01-09 NOTE — TELEPHONE ENCOUNTER
"Pt advised of instructions from provider.    However, pt is very reluctant to start insulin and states \"I won't start insulin.  I thought Victoza was going to keep me from needing to go on insulin.\"    Pt presses for other options.    Will wait for provider's response.    Khadra Dennison RN        "

## 2020-01-09 NOTE — TELEPHONE ENCOUNTER
Lab Results   Component Value Date    A1C 13.2 01/08/2020    A1C 9.9 06/26/2019    A1C 6.7 01/16/2018    A1C 8.2 08/24/2017    A1C 10.5 05/04/2017         Notes recorded by La Anderson NP on 1/9/2020 at 11:53 AM CST  Your Hgb A1C is VERY high - recommend restarting medication and follow up with Diabetic educator or RN to get training and education on insulin.    Recommend Lantus 8 units every night - we will adjust based on BS levels.  Continue monitoring BS AT LEAST 2 X daily pre meal.    Sent prescription for Lantus.  Call patient and assist in scheduling   SINDHU Gustafson

## 2020-01-10 NOTE — TELEPHONE ENCOUNTER
She can re start back on Metformin if she is interested in this option.  Victoza alone will not lower her A1C from 10 to goal < 8%.    Also diet changes are necessary - such as cutting out sugary drinks/pop/lemonade.    She should also follow up with Diabetic education.    Call patient and let me know.  SINDHU Gustafson

## 2020-01-27 ENCOUNTER — OFFICE VISIT (OUTPATIENT)
Dept: DERMATOLOGY | Facility: CLINIC | Age: 70
End: 2020-01-27
Attending: NURSE PRACTITIONER
Payer: COMMERCIAL

## 2020-01-27 VITALS
DIASTOLIC BLOOD PRESSURE: 81 MMHG | WEIGHT: 222 LBS | SYSTOLIC BLOOD PRESSURE: 124 MMHG | BODY MASS INDEX: 33.65 KG/M2 | HEIGHT: 68 IN | HEART RATE: 101 BPM

## 2020-01-27 DIAGNOSIS — D17.20 LIPOMA OF UPPER EXTREMITY, UNSPECIFIED LATERALITY: ICD-10-CM

## 2020-01-27 DIAGNOSIS — D48.5 NEOPLASM OF UNCERTAIN BEHAVIOR OF SKIN: Primary | ICD-10-CM

## 2020-01-27 DIAGNOSIS — L82.1 SEBORRHEIC KERATOSIS: ICD-10-CM

## 2020-01-27 PROCEDURE — 88305 TISSUE EXAM BY PATHOLOGIST: CPT | Mod: TC | Performed by: PHYSICIAN ASSISTANT

## 2020-01-27 PROCEDURE — 11102 TANGNTL BX SKIN SINGLE LES: CPT | Performed by: PHYSICIAN ASSISTANT

## 2020-01-27 PROCEDURE — 99214 OFFICE O/P EST MOD 30 MIN: CPT | Mod: 25 | Performed by: PHYSICIAN ASSISTANT

## 2020-01-27 ASSESSMENT — MIFFLIN-ST. JEOR: SCORE: 1566.52

## 2020-01-27 NOTE — PROGRESS NOTES
"HPI:   Chief complaints: Cyndy Wang is a 70 year old female who presents for evaluation of growth on her left pointer finger. It is painful. It peels on and off but never entirely goes away.   Condition present for:  15 years.   Previous treatments include: none    Review Of Systems  Eyes: negative  Ears/Nose/Throat: negative  Respiratory: No shortness of breath, dyspnea on exertion, cough, or hemoptysis  Cardiovascular: negative  Gastrointestinal: negative  Genitourinary: negative  Musculoskeletal: negative  Neurologic: negative  Psychiatric: negative  Skin: positive for lesion    This document serves as a record of the services and decisions personally performed and made by Kateryna Alex, MS, PA-C. It was created on her behalf by Linette Scales, a trained medical scribe. The creation of this document is based on the provider's statements to the medical scribe.  Linette Scales 3:47 PM January 27, 2020    PHYSICAL EXAM:    /81   Pulse 101   Ht 1.721 m (5' 7.75\")   Wt 100.7 kg (222 lb)   BMI 34.00 kg/m    Skin exam performed as follows: Type 2 skin. Mood appropriate  Alert and Oriented X 3. Well developed, well nourished in no distress.  General appearance: Normal  Head including face: Normal  Eyes: conjunctiva and lids: Normal  Mouth: Lips, teeth, gums: Normal  Neck: Normal  Chest-breast/axillae: Normal  Back: Normal  Spleen and liver: Normal  Cardiovascular: Exam of peripheral vascular system by observation for swelling, varicosities, edema: Normal  Genitalia: groin, buttocks: Normal  Extremities: digits/nails (clubbing): Normal  Eccrine and Apocrine glands: Normal  Right upper extremity: Normal  Left upper extremity: Normal  Right lower extremity: Normal  Left lower extremity: Normal  Skin: Scalp and body hair: See below    1. 3 cm soft squishy papule on the left lateral elbow  2. 9mm pink papule on left second digit  3. Multiple keratoses on face.     ASSESSMENT/PLAN:     1. Lipoma on left lateral elbow. " Discussed excision with Dr. Miguel if bothersome; it is becoming tender now  2. Cyst vs amelanotic melanoma vs other on left second fingertip. Shave biopsy performed.  Area cleaned and anesthetized with 1% lidocaine with epinephrine.  Dermablade used to remove the lesion and sent to pathology. Bleeding was cauterized. Pt tolerated procedure well with no complications.   3. Seborrheic keratoses on the face - benign no treatment needed        Follow-up: pending path/lipoma excision  CC:   Scribed By: Kateryna Alex MS, PA-C

## 2020-01-27 NOTE — PATIENT INSTRUCTIONS
Wound Care Instructions     FOR SUPERFICIAL WOUNDS     [facility] [phone]    [facility] [phone]                       AFTER 24 HOURS YOU SHOULD REMOVE THE BANDAGE AND BEGIN DAILY DRESSING CHANGES AS FOLLOWS:     1) Remove Dressing.     2) Clean and dry the area with tap water using a Q-tip or sterile gauze pad.     3) Apply Vaseline, Aquaphor, Polysporin ointment or Bacitracin ointment over entire wound.  Do NOT use Neosporin ointment.     4) Cover the wound with a band-aid, or a sterile non-stick gauze pad and micropore paper tape      REPEAT THESE INSTRUCTIONS AT LEAST ONCE A DAY UNTIL THE WOUND HAS COMPLETELY HEALED.    It is an old wives tale that a wound heals better when it is exposed to air and allowed to dry out. The wound will heal faster with a better cosmetic result if it is kept moist with ointment and covered with a bandage.    **Do not let the wound dry out.**      Supplies Needed:      *Cotton tipped applicators (Q-tips)    *Polysporin Ointment or Bacitracin Ointment (NOT NEOSPORIN)    *Band-aids or non-stick gauze pads and micropore paper tape.      PATIENT INFORMATION:    During the healing process you will notice a number of changes. All wounds develop a small halo of redness surrounding the wound.  This means healing is occurring. Severe itching with extensive redness usually indicates sensitivity to the ointment or bandage tape used to dress the wound.  You should call our office if this develops.      Swelling  and/or discoloration around your surgical site is common, particularly when performed around the eye.    All wounds normally drain.  The larger the wound the more drainage there will be.  After 7-10 days, you will notice the wound beginning to shrink and new skin will begin to grow.  The wound is healed when you can see skin has formed over the entire area.  A healed wound has a healthy, shiny look to the surface and is red to dark pink in color  to normalize.  Wounds may take approximately 4-6 weeks to heal.  Larger wounds may take 6-8 weeks.  After the wound is healed you may discontinue dressing changes.    You may experience a sensation of tightness as your wound heals. This is normal and will gradually subside.    Your healed wound may be sensitive to temperature changes. This sensitivity improves with time, but if you re having a lot of discomfort, try to avoid temperature extremes.    Patients frequently experience itching after their wound appears to have healed because of the continue healing under the skin.  Plain Vaseline will help relieve the itching.        POSSIBLE COMPLICATIONS    BLEEDIN. Leave the bandage in place.  2. Use tightly rolled up gauze or a cloth to apply direct pressure over the bandage for 30  minutes.  3. Reapply pressure for an additional 30 minutes if necessary  4. Use additional gauze and tape to maintain pressure once the bleeding has stopped.

## 2020-01-27 NOTE — LETTER
"    1/27/2020         RE: Cyndy Wang  [address] [address]  [facility] [address]        Dear Colleague,    Thank you for referring your patient, Cyndy Wang, to the [facility]. Please see a copy of my visit note below.    HPI:   Chief complaints: Cyndy Wang is a 70 year old female who presents for evaluation of growth on her left pointer finger. It is painful. It peels on and off but never entirely goes away.   Condition present for:  15 years.   Previous treatments include: none    Review Of Systems  Eyes: negative  Ears/Nose/Throat: negative  Respiratory: No shortness of breath, dyspnea on exertion, cough, or hemoptysis  Cardiovascular: negative  Gastrointestinal: negative  Genitourinary: negative  Musculoskeletal: negative  Neurologic: negative  Psychiatric: negative  Skin: positive for lesion    This document serves as a record of the services and decisions personally performed and made by Kateryna Alex, MS, PA-C. It was created on her behalf by Linette Scales, a trained medical scribe. The creation of this document is based on the provider's statements to the medical scribe.  Linette Scales 3:47 PM January 27, 2020    PHYSICAL EXAM:    /81   Pulse 101   Ht 1.721 m (5' 7.75\")   Wt 100.7 kg (222 lb)   BMI 34.00 kg/m     Skin exam performed as follows: Type 2 skin. Mood appropriate  Alert and Oriented X 3. Well developed, well nourished in no distress.  General appearance: Normal  Head including face: Normal  Eyes: conjunctiva and lids: Normal  Mouth: Lips, teeth, gums: Normal  Neck: Normal  Chest-breast/axillae: Normal  Back: Normal  Spleen and liver: Normal  Cardiovascular: Exam of peripheral vascular system by observation for swelling, varicosities, edema: Normal  Genitalia: groin, buttocks: Normal  Extremities: digits/nails (clubbing): Normal  Eccrine and Apocrine glands: Normal  Right upper extremity: Normal  Left upper extremity: Normal  Right lower extremity: Normal  Left " lower extremity: Normal  Skin: Scalp and body hair: See below    1. 3 cm soft squishy papule on the left lateral elbow  2. 9mm pink papule on left second digit  3. Multiple keratoses on face.     ASSESSMENT/PLAN:     1. Lipoma on left lateral elbow. Discussed excision with Dr. Miguel if bothersome; it is becoming tender now  2. Cyst vs amelanotic melanoma vs other on left second fingertip. Shave biopsy performed.  Area cleaned and anesthetized with 1% lidocaine with epinephrine.  Dermablade used to remove the lesion and sent to pathology. Bleeding was cauterized. Pt tolerated procedure well with no complications.   3. Seborrheic keratoses on the face - benign no treatment needed        Follow-up: pending path/lipoma excision  CC:   Scribed By: Kateryna Alex, MS, ESPINOZA        Again, thank you for allowing me to participate in the care of your patient.        Sincerely,        Kateryna Alex PA-C

## 2020-01-27 NOTE — NURSING NOTE
"Initial /81   Pulse 101   Ht 1.721 m (5' 7.75\")   Wt 100.7 kg (222 lb)   BMI 34.00 kg/m   Estimated body mass index is 34 kg/m  as calculated from the following:    Height as of this encounter: 1.721 m (5' 7.75\").    Weight as of this encounter: 100.7 kg (222 lb). .      "

## 2020-01-28 ENCOUNTER — TELEPHONE (OUTPATIENT)
Dept: DERMATOLOGY | Facility: CLINIC | Age: 70
End: 2020-01-28

## 2020-01-28 DIAGNOSIS — R52 PAIN: Primary | ICD-10-CM

## 2020-01-28 NOTE — TELEPHONE ENCOUNTER
Pt reports that she is having so much pain and needs something for the pain.  She has only treated pain with Aleve.  When advised to alternate Aleve with Tylenol she is asking for something stronger.  She has not removed the bandage yet.  Pt uses the [facility] pharmacy.    Maranda Thomas  [facility] RN

## 2020-01-28 NOTE — TELEPHONE ENCOUNTER
I'm sorry to hear she is in so much pain. I'd be happy to do a prescription for something stronger but I do not have e-scribing capabilities for this. Plus I won't be in [place] until Thursday.     If Elisabeth is amenable, could she sign a rx for tramadol 25 mg tablets #10 tablets to get her through the next few days?

## 2020-01-28 NOTE — TELEPHONE ENCOUNTER
Reason for Call:  Other call back    Detailed comments: pt calling stating she had a biopsy on her finger. She is still having a lot of pain and wondering if she can get something to help w/ the pain?     Phone Number Patient can be reached at: Cell number on file:    Telephone Information:   Mobile [phone]       Best Time: any     Can we leave a detailed message on this number? YES    Call taken on 1/28/2020 at 2:53 PM by Rosi Smith

## 2020-01-28 NOTE — TELEPHONE ENCOUNTER
Message left to return call.     Did she change bandage yet today? Does she have a big gob of ointment (Aquaphor, Bacitracin, Polysporin or Vaseline) on the area?  (enough to keep it moist round the clock)    A wound kept moist will heal quicker and with less discomfort, so if wound dries out, expect increase in discomfort. Be sure to cover with a bandage.     What has she tried for pain? (May try Tylenol or Ibuprofen per package directions)     Any symptoms of infection?     Ansley Gallo RN

## 2020-01-29 RX ORDER — TRAMADOL HYDROCHLORIDE 50 MG/1
TABLET ORAL
Qty: 10 TABLET | Refills: 0 | Status: SHIPPED | OUTPATIENT
Start: 2020-01-29 | End: 2021-03-05

## 2020-01-29 NOTE — TELEPHONE ENCOUNTER
Spoke to patient and she stated she is still having pain, but it is better than yesterday. Pt denied swelling, redness, hot to the touch, or pus colored drainage. Advised pt that script was at the  of derm and to pick it up there. Pt verbalized understanding.  Nicole FLOREZ RN BSN PHN  [facility]

## 2020-01-29 NOTE — TELEPHONE ENCOUNTER
I printed off prescription, I will have the  hold prescription, can  before 4:40 pm today. Any signs of concerns for infections?

## 2020-01-30 NOTE — TELEPHONE ENCOUNTER
Spoke to patient and she stated that she is still having pain from the biopsy.  Pt reported that she did go to work and used her fingers. She stated that she is seeing some redness around the wound itself, but not spreading at all. She agreed that it looked more like a halo around the wound. She denied signs of infection. Advised pt to try to use tylenol or ibuprofen for pain and to continue to watch for infection. Advised to call if she had questions or concerns.  Pt agreed with plan.  Nicole FLOREZ RN BSN PHN  [facility]

## 2020-01-30 NOTE — TELEPHONE ENCOUNTER
Pt wants to know if it is normal to hurt so longer after surgery? Pt states it is not the wound (top of index finger), but pain is down her finger and into the palm of her hand and up the middle finger.   Also questions the redness of the wound.     Pt states she got pain medication (tramadol) 10 tablets yesterday and only has 3 left - says she is not one that usually takes pain medication    Please contact pt @:  [phone]    Denise Behrendt  Specialty CSS

## 2020-01-31 LAB — COPATH REPORT: NORMAL

## 2020-02-06 ENCOUNTER — TELEPHONE (OUTPATIENT)
Dept: FAMILY MEDICINE | Facility: CLINIC | Age: 70
End: 2020-02-06

## 2020-02-06 NOTE — TELEPHONE ENCOUNTER
Panel Management Review      Patient has the following on her problem list:   Patient Active Problem List   Diagnosis     MS (multiple sclerosis) (H)     Hyperlipidemia LDL goal <100     Vitamin D deficiency disease     Health Care Home     Advanced directives, counseling/discussion     Generalized anxiety disorder     Type 2 diabetes mellitus with other diabetic kidney complication (H)     Obesity due to excess calories, unspecified obesity severity     Binge eating disorder     Hypertension, goal below 140/90     Hypothyroidism, unspecified type     Major depressive disorder, recurrent episode, moderate (H)       Composite cancer screening  Chart review shows that this patient is due/due soon for the following Mammogram  Summary:    Patient is due/failing the following:   MAMMOGRAM    Action needed:   Patient needs to schedule mammogram.     Type of outreach:    Sent letter.    Questions for provider review:    None                                                                                                                                    Amy Mack on 2/6/2020 at 1:30 PM       Chart routed to none .

## 2020-02-06 NOTE — LETTER
[facility]  [address]  [facility] [address]  [phone]        Cyndy Wang  [address] [place] [address]  [facility] [place] [address]      February 6, 2020      Dear Cyndy,    I care about your health and have reviewed your health plan, including your medical conditions, medication list, and lab results and am making recommendations based on this review, to better manage your health.    You are in particular need of attention regarding:  -Breast Cancer Screening  -Colon Cancer Screening    I am recommending that you:  -schedule a MAMMOGRAM which is due. You can call  [phone] to schedule your mammogram.    -schedule a COLONOSCOPY.  Colon cancer is now the second leading cause of cancer-related deaths in the [place] for both men and women.  There are over 130,000 new cases and 50,000 deaths per year from colon cancer.  A recent study, which included patients ages 55 to 79 found 50,400 American deaths from colorectal cancer could have been prevented if patients had undergone a colonoscopy in the previous 10 years.    If you have not had a colonoscopy, we encourage you to schedule by contacting us at [phone], Monday through Friday.  After hours, you may leave a message and we will return your call during normal business hours.      There is another option called a FIT test, if you don t wish to have a colonoscopy, which needs to be repeated every year.  It does replace the colonoscopy for colorectal cancer screening and can detect hidden bleeding in the lower colon.  If a positive result is obtained, you would be referred for a colonoscopy. Please discuss this option with your provider.      For patients under/uninsured, we recommend you contact the [employer] program. [employer] is a free colorectal cancer screening program that provides colonoscopies for eligible under/uninsured [place] men and women. If you are interested in receiving a free colonoscopy,  please call [facility] at [phone] (mention code [employer]) to see if you re eligible.     If you've had the preventative screening completed at another facility or feel you're not due for this screening, please call our clinic at the number listed above or send us a My Chart message so we can update our records. We would like to thank you in advance for taking the time to take care of your health.  If you have any questions, please don t hesitate to contact our clinic.    Sincerely,       Your [facility] Team

## 2020-02-06 NOTE — LETTER
[facility]  [address]  [facility] [address]  [phone]        Cyndy Wang  [address] [place] [address] [address]  [facility] [place] [address]      February 6, 2020      Dear Cyndy,    I care about your health and have reviewed your health plan, including your medical conditions, medication list, and lab results and am making recommendations based on this review, to better manage your health.    You are in particular need of attention regarding:  -Breast Cancer Screening    I am recommending that you:  -schedule a MAMMOGRAM which is due. You can call  [phone] to schedule your mammogram.      If you've had the preventative screening completed at another facility or feel you're not due for this screening, please call our clinic at the number listed above or send us a My Chart message so we can update our records. We would like to thank you in advance for taking the time to take care of your health.  If you have any questions, please don t hesitate to contact our clinic.    Sincerely,       Your [facility] Team

## 2020-04-11 ENCOUNTER — TELEPHONE (OUTPATIENT)
Dept: FAMILY MEDICINE | Facility: CLINIC | Age: 70
End: 2020-04-11

## 2020-04-11 NOTE — TELEPHONE ENCOUNTER
While doing an adherence call pt states she is not taking the victoza because she needed to take metformin with it (and this makes her sick), she is inquiring about starting insulin?  Pt states she will call for appt.      Thank you    EDD SHELL, PHARMACIST  [facility]  [phone]

## 2020-04-13 NOTE — TELEPHONE ENCOUNTER
Left message for patient to return call to clinic and schedule telephone visit with provider.    Josefa RO Rn

## 2020-04-15 ENCOUNTER — VIRTUAL VISIT (OUTPATIENT)
Dept: FAMILY MEDICINE | Facility: CLINIC | Age: 70
End: 2020-04-15
Payer: COMMERCIAL

## 2020-04-15 DIAGNOSIS — I10 HYPERTENSION, GOAL BELOW 140/90: ICD-10-CM

## 2020-04-15 DIAGNOSIS — E11.29 TYPE 2 DIABETES MELLITUS WITH OTHER DIABETIC KIDNEY COMPLICATION (H): Primary | ICD-10-CM

## 2020-04-15 DIAGNOSIS — E78.5 HYPERLIPIDEMIA LDL GOAL <100: ICD-10-CM

## 2020-04-15 DIAGNOSIS — F41.1 GENERALIZED ANXIETY DISORDER: ICD-10-CM

## 2020-04-15 DIAGNOSIS — E66.09 OBESITY DUE TO EXCESS CALORIES, UNSPECIFIED OBESITY SEVERITY: ICD-10-CM

## 2020-04-15 DIAGNOSIS — F50.819 BINGE EATING DISORDER: ICD-10-CM

## 2020-04-15 DIAGNOSIS — E03.9 HYPOTHYROIDISM, UNSPECIFIED TYPE: ICD-10-CM

## 2020-04-15 DIAGNOSIS — R79.89 ELEVATED LIVER FUNCTION TESTS: ICD-10-CM

## 2020-04-15 DIAGNOSIS — G35 MS (MULTIPLE SCLEROSIS) (H): ICD-10-CM

## 2020-04-15 PROCEDURE — 99214 OFFICE O/P EST MOD 30 MIN: CPT | Mod: 95 | Performed by: NURSE PRACTITIONER

## 2020-04-15 RX ORDER — SEMAGLUTIDE 1.34 MG/ML
0.25 INJECTION, SOLUTION SUBCUTANEOUS
Qty: 1.5 ML | Refills: 1 | Status: SHIPPED | OUTPATIENT
Start: 2020-04-15 | End: 2020-05-13

## 2020-04-15 RX ORDER — TOPIRAMATE 25 MG/1
TABLET, FILM COATED ORAL
Qty: 120 TABLET | Refills: 1 | Status: SHIPPED | OUTPATIENT
Start: 2020-04-15 | End: 2020-05-13

## 2020-04-15 ASSESSMENT — PATIENT HEALTH QUESTIONNAIRE - PHQ9: SUM OF ALL RESPONSES TO PHQ QUESTIONS 1-9: 5

## 2020-04-15 NOTE — PATIENT INSTRUCTIONS
Restart Topiramate (Topamax) 25 mg once daily at bedtime for 7 days - then increased to 25 mg twice daily for 7 days then 50 mg (or 2 tablets) twice daily.    Start Ozempic subcutaneous injection for diabetes and weight loss.  Take this once weekly for the next month.  Keep track of blood sugars - take before a meal.  We will adjust dosing next month when we follow up.    Follow up with me in 1 month either in telephone, video or in clinic.    Labs ordered as well as Ultrasound of Thyroid ordered today.      MY DIABETES TODAY:    1)  Goal A1C is under <8.0  Mine is:      Lab Results   Component Value Date    A1C 13.2 01/08/2020       2)  Goal LDL (bad cholesterol) under 100  (measured at least yearly)- I am currently at:   Lab Results   Component Value Date    LDL  01/08/2020     Cannot estimate LDL when triglyceride exceeds 400 mg/dL     01/08/2020       3)  Goal blood pressure under 130/80- mine was Data Unavailable today    4)  Take aspirin daily 81 mg once    5)  No tobacco use        ACTION PLAN CHANGES FROM TODAY:    Care Plan changes:    See ABOVE    Labs:     I will fast (nothing to eat or drink except water with medications) 12 hours before my lab appointment.    Follow up:    I will follow up with my nurse practioner:  1 month.

## 2020-04-15 NOTE — PROGRESS NOTES
"Cyndy Wang is a 70 year old female who is being evaluated via a billable telephone visit.      The patient has been notified of following:     \"This telephone visit will be conducted via a call between you and your physician/provider. We have found that certain health care needs can be provided without the need for a physical exam.  This service lets us provide the care you need with a short phone conversation.  If a prescription is necessary we can send it directly to your pharmacy.  If lab work is needed we can place an order for that and you can then stop by our lab to have the test done at a later time.    Telephone visits are billed at different rates depending on your insurance coverage. During this emergency period, for some insurers they may be billed the same as an in-person visit.  Please reach out to your insurance provider with any questions.    If during the course of the call the physician/provider feels a telephone visit is not appropriate, you will not be charged for this service.\"    Patient has given verbal consent for Telephone visit?  Yes    How would you like to obtain your AVS? Mail a copy    Subjective     Cyndy Wang is a 70 year old female who presents to clinic today for the following health issues:    Diabetes Follow-up    How often are you checking your blood sugar? One time daily  What time of day are you checking your blood sugars (select all that apply)?  Before and after meals  Have you had any blood sugars above 200?  Yes   Have you had any blood sugars below 70?  No    What symptoms do you notice when your blood sugar is low?  None and Not applicable    What concerns do you have today about your diabetes? None and Blood sugar is often over 200     Do you have any of these symptoms? (Select all that apply)  Numbness in feet    Have you had a diabetic eye exam in the last 12 months? No    She reports to drinking more pop lately - had used Topamax in the past and this helped.     "   Hyperlipidemia Follow-Up      Are you regularly taking any medication or supplement to lower your cholesterol?   Yes- pravastatin    Are you having muscle aches or other side effects that you think could be caused by your cholesterol lowering medication?  No    Hypertension Follow-up      Do you check your blood pressure regularly outside of the clinic? No     Are you following a low salt diet? Yes    Are your blood pressures ever more than 140 on the top number (systolic) OR more   than 90 on the bottom number (diastolic), for example 140/90? No    BP Readings from Last 2 Encounters:   01/27/20 124/81   01/08/20 138/80     Hemoglobin A1C (%)   Date Value   01/08/2020 13.2 (H)   06/26/2019 9.9 (H)     LDL Cholesterol Calculated (mg/dL)   Date Value   01/08/2020     Cannot estimate LDL when triglyceride exceeds 400 mg/dL   06/26/2019 102 (H)     LDL Cholesterol Direct (mg/dL)   Date Value   01/08/2020 149 (H)       Depression and Anxiety Follow-Up    How are you doing with your depression since your last visit? No change    How are you doing with your anxiety since your last visit?  No change    Are you having other symptoms that might be associated with depression or anxiety? No    Have you had a significant life event? No     Do you have any concerns with your use of alcohol or other drugs? No    Social History     Tobacco Use     Smoking status: Former Smoker     Types: Cigarettes     Smokeless tobacco: Never Used     Tobacco comment: 1-2 cigarettes occasionally   Substance Use Topics     Alcohol use: No     Alcohol/week: 0.0 standard drinks     Drug use: No     PHQ 8/24/2017 1/17/2019 4/15/2020   PHQ-9 Total Score 2 4 5   Q9: Thoughts of better off dead/self-harm past 2 weeks Not at all Not at all Not at all     ABRAHAM-7 SCORE 1/16/2015 8/24/2017 1/17/2019   Total Score 0 - -   Total Score - 3 2     Last PHQ-9 4/15/2020   1.  Little interest or pleasure in doing things 1   2.  Feeling down, depressed, or hopeless  "0   3.  Trouble falling or staying asleep, or sleeping too much 0   4.  Feeling tired or having little energy 1   5.  Poor appetite or overeating 3   6.  Feeling bad about yourself 0   7.  Trouble concentrating 0   8.  Moving slowly or restless 0   Q9: Thoughts of better off dead/self-harm past 2 weeks 0   PHQ-9 Total Score 5   Difficulty at work, home, or with people Not difficult at all     ABRAHAM-7  1/17/2019   1. Feeling nervous, anxious, or on edge 1   2. Not being able to stop or control worrying 0   3. Worrying too much about different things 0   4. Trouble relaxing 0   5. Being so restless that it is hard to sit still 0   6. Becoming easily annoyed or irritable 1   7. Feeling afraid, as if something awful might happen 0   ABRAHAM-7 Total Score 2   If you checked any problems, how difficult have they made it for you to do your work, take care of things at home, or get along with other people? Somewhat difficult     In the past two weeks have you had thoughts of suicide or self-harm?  No.    Do you have concerns about your personal safety or the safety of others?   No    Suicide Assessment Five-step Evaluation and Treatment (SAFE-T)    Hypothyroidism Follow-up      Since last visit, patient describes the following symptoms: anxiety and depression      How many servings of fruits and vegetables do you eat daily?  4 or more    On average, how many sweetened beverages do you drink each day (Examples: soda, juice, sweet tea, etc.  Do NOT count diet or artificially sweetened beverages)?   \"way to many\"    How many days per week do you exercise enough to make your heart beat faster? 3 or less    How many minutes a day do you exercise enough to make your heart beat faster? 9 or less  How many days per week do you miss taking your medication? 2    What makes it hard for you to take your medications?  she just doesn't take it sometimes.     Was told by Dentist that her thyroid gland was \"enlarged\"  Family history + for mom " with goiter.  No previous imaging done on patient.        Patient Active Problem List   Diagnosis     MS (multiple sclerosis) (H)     Hyperlipidemia LDL goal <100     Vitamin D deficiency disease     Health Care Home     Advanced directives, counseling/discussion     Generalized anxiety disorder     Type 2 diabetes mellitus with other diabetic kidney complication (H)     Obesity due to excess calories, unspecified obesity severity     Binge eating disorder     Hypertension, goal below 140/90     Hypothyroidism, unspecified type     Major depressive disorder, recurrent episode, moderate (H)     Past Surgical History:   Procedure Laterality Date     REPAIR TENDON FINGER(S) Right 5/22/2015    Procedure: REPAIR TENDON FINGER(S);  Surgeon: Xavier Paulson MD;  Location: [facility]       Social History     Tobacco Use     Smoking status: Former Smoker     Types: Cigarettes     Smokeless tobacco: Never Used     Tobacco comment: 1-2 cigarettes occasionally   Substance Use Topics     Alcohol use: No     Alcohol/week: 0.0 standard drinks     Family History   Problem Relation Age of Onset     Neurologic Disorder Mother         Stroke     Heart Disease Maternal Grandfather      C.A.D. Maternal Grandmother      Breast Cancer No family hx of      Cancer - colorectal No family hx of      Melanoma No family hx of          Current Outpatient Medications   Medication Sig Dispense Refill     aspirin 325 MG EC tablet Take 1 tablet by mouth daily. 100 tablet 3     blood glucose (NO BRAND SPECIFIED) lancets standard 1 each by In Vitro route 3 times daily Use to test blood sugar daily whatever brand her insurance will pay for 100 each 1     blood glucose (NO BRAND SPECIFIED) test strip 1 strip by In Vitro route 3 times daily Use to test blood sugars daily. What ever her insurance will pay for 100 strip 1     blood glucose monitoring (NO BRAND SPECIFIED) meter device kit by In Vitro route 3 times daily Use to test blood sugar daily. What  ever her insurance will pay for 1 kit 0     insulin pen needle (31G X 5 MM) 31G X 5 MM miscellaneous Use 1 pen needles daily or as directed. 30 each 3     insulin pen needle (B-D U/F) 31G X 8 MM Use once daily or as directed. 30 each prn     levothyroxine (SYNTHROID/LEVOTHROID) 175 MCG tablet Take 1 tablet (175 mcg) by mouth daily 90 tablet 1     losartan (COZAAR) 50 MG tablet TAKE ONE TABLET BY MOUTH ONCE DAILY 90 tablet 3     pravastatin (PRAVACHOL) 20 MG tablet Take 1 tablet (20 mg) by mouth daily 90 tablet 3     Semaglutide,0.25 or 0.5MG/DOS, (OZEMPIC, 0.25 OR 0.5 MG/DOSE,) 2 MG/1.5ML SOPN Inject 0.25 mg Subcutaneous every 7 days 1.5 mL 1     topiramate (TOPAMAX) 25 MG tablet Take 1 tablet (25 mg) by mouth daily for 7 days, THEN 1 tablet (25 mg) 2 times daily for 7 days, THEN 2 tablets (50 mg) 2 times daily. 120 tablet 1     traMADol (ULTRAM) 50 MG tablet 1 tab po q 4-6 hours PRN pain 10 tablet 0     venlafaxine (EFFEXOR-XR) 75 MG 24 hr capsule TAKE THREE CAPSULES BY MOUTH EVERY  capsule 3     triamcinolone (KENALOG) 0.1 % cream Apply sparingly to affected area three times daily for 14 days. (Patient not taking: Reported on 1/17/2019) 15 g 0     Allergies   Allergen Reactions     Sulfa Drugs Difficulty breathing     Lisinopril Cough     Penicillins Difficulty breathing     Recent Labs   Lab Test 01/08/20  1150 06/26/19  1521 01/16/18  1608 08/24/17  1031  09/08/16  1916   A1C 13.2* 9.9* 6.7* 8.2*   < >  --    LDL Cannot estimate LDL when triglyceride exceeds 400 mg/dL  149* 102*  --  141*  --   --    HDL 38* 45*  --  37*  --   --    TRIG 480* 178*  --  171*  --   --    ALT 61* 44  --   --   --  40   CR  --  0.91  --  0.90   < >  --    GFRESTIMATED  --  64  --  62   < >  --    GFRESTBLACK  --  74  --  75   < >  --    POTASSIUM  --  4.3  --  4.5   < >  --    TSH  --  1.25  --  2.25  --  2.55    < > = values in this interval not displayed.      BP Readings from Last 3 Encounters:   01/27/20 124/81    01/08/20 138/80   01/17/19 134/80    Wt Readings from Last 3 Encounters:   01/27/20 100.7 kg (222 lb)   01/08/20 100.7 kg (222 lb)   01/17/19 102.5 kg (225 lb 14.4 oz)                    Reviewed and updated as needed this visit by Provider  Tobacco  Allergies  Meds  Problems  Med Hx  Surg Hx  Fam Hx         Review of Systems   ROS COMP: Constitutional, HEENT, cardiovascular, pulmonary, GI, , musculoskeletal, neuro, skin, endocrine and psych systems are negative, except as otherwise noted.       Objective   Reported vitals:  There were no vitals taken for this visit.   healthy and alert  PSYCH: Alert and oriented times 3; coherent speech, normal   rate and volume, able to articulate logical thoughts, able   to abstract reason, no tangential thoughts, no hallucinations   or delusions  Her affect is normal  RESP: No cough, no audible wheezing, able to talk in full sentences  Remainder of exam unable to be completed due to telephone visits    Diagnostic Test Results:  Labs reviewed in Epic        Assessment/Plan:  1. Type 2 diabetes mellitus with other diabetic kidney complication (H)  Not controlled.  Stopped Victoza - start Ozempic for both A1C reduction and weight loss.  Stop drinking pop discussed - had success in the past with Topamax - restarted this.  History of binge eating disorder - encouraged counseling for this.    - Home Blood Pressure Monitor Order for DME - ONLY FOR DME  - Semaglutide,0.25 or 0.5MG/DOS, (OZEMPIC, 0.25 OR 0.5 MG/DOSE,) 2 MG/1.5ML SOPN; Inject 0.25 mg Subcutaneous every 7 days  Dispense: 1.5 mL; Refill: 1  - topiramate (TOPAMAX) 25 MG tablet; Take 1 tablet (25 mg) by mouth daily for 7 days, THEN 1 tablet (25 mg) 2 times daily for 7 days, THEN 2 tablets (50 mg) 2 times daily.  Dispense: 120 tablet; Refill: 1  - Hemoglobin A1c; Future  - **Basic metabolic panel FUTURE anytime; Future  - CBC with platelets; Future    2. MS (multiple sclerosis) (H)     - CBC with platelets;  Future    3. Hypertension, goal below 140/90   Ordered home Blood pressure monitor    - Home Blood Pressure Monitor Order for DME - ONLY FOR DME  - **Basic metabolic panel FUTURE anytime; Future    4. Hypothyroidism, unspecified type  Recheck TSH in 2-3 months.    - US Thyroid; Future  - TSH with free T4 reflex; Future    5. Obesity due to excess calories, unspecified obesity severity     - topiramate (TOPAMAX) 25 MG tablet; Take 1 tablet (25 mg) by mouth daily for 7 days, THEN 1 tablet (25 mg) 2 times daily for 7 days, THEN 2 tablets (50 mg) 2 times daily.  Dispense: 120 tablet; Refill: 1  - CBC with platelets; Future    6. Hyperlipidemia LDL goal <100   Continue statin    7. Binge eating disorder     - topiramate (TOPAMAX) 25 MG tablet; Take 1 tablet (25 mg) by mouth daily for 7 days, THEN 1 tablet (25 mg) 2 times daily for 7 days, THEN 2 tablets (50 mg) 2 times daily.  Dispense: 120 tablet; Refill: 1    8. Generalized anxiety disorder       9. Elevated liver function tests       Restart Topiramate (Topamax) 25 mg once daily at bedtime for 7 days - then increased to 25 mg twice daily for 7 days then 50 mg (or 2 tablets) twice daily.    Start Ozempic subcutaneous injection for diabetes and weight loss.  Take this once weekly for the next month.  Keep track of blood sugars - take before a meal.  We will adjust dosing next month when we follow up.    Follow up with me in 1 month either in telephone, video or in clinic.    Labs ordered as well as Ultrasound of Thyroid ordered today.  - Hepatic panel (Albumin, ALT, AST, Bili, Alk Phos, TP); Future    Return in about 4 weeks (around 5/13/2020) for Diabetes, Medication Follow up.      Phone call duration:  21 minutes    La Anderson NP

## 2020-05-13 ENCOUNTER — VIRTUAL VISIT (OUTPATIENT)
Dept: FAMILY MEDICINE | Facility: CLINIC | Age: 70
End: 2020-05-13
Payer: COMMERCIAL

## 2020-05-13 DIAGNOSIS — E78.5 HYPERLIPIDEMIA LDL GOAL <100: ICD-10-CM

## 2020-05-13 DIAGNOSIS — G35 MS (MULTIPLE SCLEROSIS) (H): ICD-10-CM

## 2020-05-13 DIAGNOSIS — F33.1 MAJOR DEPRESSIVE DISORDER, RECURRENT EPISODE, MODERATE (H): ICD-10-CM

## 2020-05-13 DIAGNOSIS — I10 HYPERTENSION, GOAL BELOW 140/90: ICD-10-CM

## 2020-05-13 DIAGNOSIS — E11.29 TYPE 2 DIABETES MELLITUS WITH OTHER DIABETIC KIDNEY COMPLICATION (H): Primary | ICD-10-CM

## 2020-05-13 DIAGNOSIS — F50.819 BINGE EATING DISORDER: ICD-10-CM

## 2020-05-13 DIAGNOSIS — E66.09 OBESITY DUE TO EXCESS CALORIES, UNSPECIFIED OBESITY SEVERITY: ICD-10-CM

## 2020-05-13 DIAGNOSIS — E03.9 HYPOTHYROIDISM, UNSPECIFIED TYPE: ICD-10-CM

## 2020-05-13 PROCEDURE — 99214 OFFICE O/P EST MOD 30 MIN: CPT | Mod: 95 | Performed by: NURSE PRACTITIONER

## 2020-05-13 RX ORDER — SEMAGLUTIDE 1.34 MG/ML
0.25 INJECTION, SOLUTION SUBCUTANEOUS
Qty: 1.5 ML | Refills: 1 | Status: SHIPPED | OUTPATIENT
Start: 2020-05-13 | End: 2020-08-04

## 2020-05-13 RX ORDER — LEVOTHYROXINE SODIUM 175 UG/1
175 TABLET ORAL DAILY
Qty: 90 TABLET | Refills: 1 | Status: SHIPPED | OUTPATIENT
Start: 2020-05-13 | End: 2021-02-02

## 2020-05-13 RX ORDER — ASPIRIN 81 MG/1
81 TABLET, CHEWABLE ORAL DAILY
COMMUNITY
Start: 2020-05-13

## 2020-05-13 RX ORDER — TOPIRAMATE 25 MG/1
25 TABLET, FILM COATED ORAL 2 TIMES DAILY
Qty: 60 TABLET | Refills: 6 | Status: SHIPPED | OUTPATIENT
Start: 2020-05-13 | End: 2020-08-04

## 2020-05-13 NOTE — PROGRESS NOTES
"Cyndy Wang is a 70 year old female who is being evaluated via a billable telephone visit.      The patient has been notified of following:     \"This telephone visit will be conducted via a call between you and your physician/provider. We have found that certain health care needs can be provided without the need for a physical exam.  This service lets us provide the care you need with a short phone conversation.  If a prescription is necessary we can send it directly to your pharmacy.  If lab work is needed we can place an order for that and you can then stop by our lab to have the test done at a later time.    Telephone visits are billed at different rates depending on your insurance coverage. During this emergency period, for some insurers they may be billed the same as an in-person visit.  Please reach out to your insurance provider with any questions.    If during the course of the call the physician/provider feels a telephone visit is not appropriate, you will not be charged for this service.\"    Patient has given verbal consent for Telephone visit?  Yes    What phone number would you like to be contacted at? [phone]    How would you like to obtain your AVS? Mail a copy    Subjective     Cyndy Wang is a 70 year old female who presents to clinic today for the following health issues:    HPI  Diabetes Follow-up    How often are you checking your blood sugar? Two times daily  Blood sugar testing frequency justification:  Uncontrolled diabetes  What time of day are you checking your blood sugars (select all that apply)?  Before meals  Have you had any blood sugars above 200?  Yes - last was \"the other day\" - does not remember the exact number   Have you had any blood sugars below 70?  No    What symptoms do you notice when your blood sugar is low?  None and Not applicable    What concerns do you have today about your diabetes? Blood sugar is often over 200 - states \"I should be much more concerned\"      Do " "you have any of these symptoms? (Select all that apply)  Numbness in feet and Burning in feet    Have you had a diabetic eye exam in the last 12 months? No    Started Ozempic recently - but wasn't checking BS levels so stopped.  Reports \"not good with diabetes medications\"    Started on Topamax which helped her stop drinking pop and lemonade - was drinking 8 per day.  Drinking more water.    BP Readings from Last 2 Encounters:   01/27/20 124/81   01/08/20 138/80     Hemoglobin A1C (%)   Date Value   01/08/2020 13.2 (H)   06/26/2019 9.9 (H)     LDL Cholesterol Calculated (mg/dL)   Date Value   01/08/2020     Cannot estimate LDL when triglyceride exceeds 400 mg/dL   06/26/2019 102 (H)     LDL Cholesterol Direct (mg/dL)   Date Value   01/08/2020 149 (H)         How many servings of fruits and vegetables do you eat daily?  2-3    On average, how many sweetened beverages do you drink each day (Examples: soda, juice, sweet tea, etc.  Do NOT count diet or artificially sweetened beverages)?   0    How many days per week do you exercise enough to make your heart beat faster? 3 or less    How many minutes a day do you exercise enough to make your heart beat faster? 9 or less    How many days per week do you miss taking your medication? 0      Hyperlipidemia Follow-Up      Are you regularly taking any medication or supplement to lower your cholesterol?   No    Are you having muscle aches or other side effects that you think could be caused by your cholesterol lowering medication?  No    Hypertension Follow-up      Do you check your blood pressure regularly outside of the clinic? No     Are you following a low salt diet? No    Are your blood pressures ever more than 140 on the top number (systolic) OR more   than 90 on the bottom number (diastolic), for example 140/90? No    Depression and Anxiety Follow-Up    How are you doing with your depression since your last visit? Worsened more fatigued lately - but more due to COVID and " having to stay inside    How are you doing with your anxiety since your last visit?  No change    Are you having other symptoms that might be associated with depression or anxiety? No    Have you had a significant life event? Job Concerns     Do you have any concerns with your use of alcohol or other drugs? No    Social History     Tobacco Use     Smoking status: Former Smoker     Types: Cigarettes     Smokeless tobacco: Never Used     Tobacco comment: 1-2 cigarettes occasionally   Substance Use Topics     Alcohol use: No     Alcohol/week: 0.0 standard drinks     Drug use: No     PHQ 8/24/2017 1/17/2019 4/15/2020   PHQ-9 Total Score 2 4 5   Q9: Thoughts of better off dead/self-harm past 2 weeks Not at all Not at all Not at all     ABRAHAM-7 SCORE 1/16/2015 8/24/2017 1/17/2019   Total Score 0 - -   Total Score - 3 2     Last PHQ-9 4/15/2020   1.  Little interest or pleasure in doing things 1   2.  Feeling down, depressed, or hopeless 0   3.  Trouble falling or staying asleep, or sleeping too much 0   4.  Feeling tired or having little energy 1   5.  Poor appetite or overeating 3   6.  Feeling bad about yourself 0   7.  Trouble concentrating 0   8.  Moving slowly or restless 0   Q9: Thoughts of better off dead/self-harm past 2 weeks 0   PHQ-9 Total Score 5   Difficulty at work, home, or with people Not difficult at all     ABRAHAM-7  1/17/2019   1. Feeling nervous, anxious, or on edge 1   2. Not being able to stop or control worrying 0   3. Worrying too much about different things 0   4. Trouble relaxing 0   5. Being so restless that it is hard to sit still 0   6. Becoming easily annoyed or irritable 1   7. Feeling afraid, as if something awful might happen 0   ABRHAAM-7 Total Score 2   If you checked any problems, how difficult have they made it for you to do your work, take care of things at home, or get along with other people? Somewhat difficult     In the past two weeks have you had thoughts of suicide or self-harm?  No.     Do you have concerns about your personal safety or the safety of others?   No    Suicide Assessment Five-step Evaluation and Treatment (SAFE-T)    Hypothyroidism Follow-up      Since last visit, patient describes the following symptoms: Weight stable, no hair loss, no skin changes, no constipation, no loose stools      Patient Active Problem List   Diagnosis     MS (multiple sclerosis) (H)     Hyperlipidemia LDL goal <100     Vitamin D deficiency disease     Health Care Home     Advanced directives, counseling/discussion     Generalized anxiety disorder     Type 2 diabetes mellitus with other diabetic kidney complication (H)     Obesity due to excess calories, unspecified obesity severity     Binge eating disorder     Hypertension, goal below 140/90     Hypothyroidism, unspecified type     Major depressive disorder, recurrent episode, moderate (H)     Past Surgical History:   Procedure Laterality Date     REPAIR TENDON FINGER(S) Right 5/22/2015    Procedure: REPAIR TENDON FINGER(S);  Surgeon: Xavier Paulson MD;  Location: [facility]       Social History     Tobacco Use     Smoking status: Former Smoker     Types: Cigarettes     Smokeless tobacco: Never Used     Tobacco comment: 1-2 cigarettes occasionally   Substance Use Topics     Alcohol use: No     Alcohol/week: 0.0 standard drinks     Family History   Problem Relation Age of Onset     Neurologic Disorder Mother         Stroke     Heart Disease Maternal Grandfather      C.A.D. Maternal Grandmother      Breast Cancer No family hx of      Cancer - colorectal No family hx of      Melanoma No family hx of          Current Outpatient Medications   Medication Sig Dispense Refill     aspirin (ASA) 81 MG chewable tablet Take 1 tablet (81 mg) by mouth daily       blood glucose (NO BRAND SPECIFIED) lancets standard 1 each by In Vitro route 3 times daily Use to test blood sugar daily whatever brand her insurance will pay for 100 each 1     blood glucose (NO BRAND  SPECIFIED) test strip 1 strip by In Vitro route 3 times daily Use to test blood sugars daily. What ever her insurance will pay for 100 strip 1     blood glucose monitoring (NO BRAND SPECIFIED) meter device kit by In Vitro route 3 times daily Use to test blood sugar daily. What ever her insurance will pay for 1 kit 0     insulin pen needle (31G X 5 MM) 31G X 5 MM miscellaneous Use 1 pen needles daily or as directed. 30 each 3     insulin pen needle (B-D U/F) 31G X 8 MM Use once daily or as directed. 30 each prn     levothyroxine (SYNTHROID/LEVOTHROID) 175 MCG tablet Take 1 tablet (175 mcg) by mouth daily 90 tablet 1     losartan (COZAAR) 50 MG tablet TAKE ONE TABLET BY MOUTH ONCE DAILY 90 tablet 3     pravastatin (PRAVACHOL) 20 MG tablet Take 1 tablet (20 mg) by mouth daily 90 tablet 3     Semaglutide,0.25 or 0.5MG/DOS, (OZEMPIC, 0.25 OR 0.5 MG/DOSE,) 2 MG/1.5ML SOPN Inject 0.25 mg Subcutaneous every 7 days 1.5 mL 1     topiramate (TOPAMAX) 25 MG tablet Take 1 tablet (25 mg) by mouth 2 times daily 60 tablet 6     traMADol (ULTRAM) 50 MG tablet 1 tab po q 4-6 hours PRN pain 10 tablet 0     venlafaxine (EFFEXOR-XR) 75 MG 24 hr capsule TAKE THREE CAPSULES BY MOUTH EVERY  capsule 3     triamcinolone (KENALOG) 0.1 % cream Apply sparingly to affected area three times daily for 14 days. (Patient not taking: Reported on 1/17/2019) 15 g 0     Allergies   Allergen Reactions     Sulfa Drugs Difficulty breathing     Lisinopril Cough     Penicillins Difficulty breathing     Recent Labs   Lab Test 01/08/20  1150 06/26/19  1521 01/16/18  1608 08/24/17  1031  09/08/16  1916   A1C 13.2* 9.9* 6.7* 8.2*   < >  --    LDL Cannot estimate LDL when triglyceride exceeds 400 mg/dL  149* 102*  --  141*  --   --    HDL 38* 45*  --  37*  --   --    TRIG 480* 178*  --  171*  --   --    ALT 61* 44  --   --   --  40   CR  --  0.91  --  0.90   < >  --    GFRESTIMATED  --  64  --  62   < >  --    GFRESTBLACK  --  74  --  75   < >  --     POTASSIUM  --  4.3  --  4.5   < >  --    TSH  --  1.25  --  2.25  --  2.55    < > = values in this interval not displayed.      BP Readings from Last 3 Encounters:   01/27/20 124/81   01/08/20 138/80   01/17/19 134/80    Wt Readings from Last 3 Encounters:   01/27/20 100.7 kg (222 lb)   01/08/20 100.7 kg (222 lb)   01/17/19 102.5 kg (225 lb 14.4 oz)                    Reviewed and updated as needed this visit by Provider  Tobacco  Allergies  Meds  Problems  Med Hx  Surg Hx  Fam Hx         Review of Systems   Constitutional, HEENT, cardiovascular, pulmonary, GI, , musculoskeletal, neuro, skin, endocrine and psych systems are negative, except as otherwise noted.       Objective   Reported vitals:  There were no vitals taken for this visit.   healthy, alert and no distress  PSYCH: Alert and oriented times 3; coherent speech, normal   rate and volume, able to articulate logical thoughts, able   to abstract reason, no tangential thoughts, no hallucinations   or delusions  Her affect is normal  RESP: No cough, no audible wheezing, able to talk in full sentences  Remainder of exam unable to be completed due to telephone visits    Diagnostic Test Results:  Labs reviewed in Epic        Assessment/Plan:  1. Type 2 diabetes mellitus with other diabetic kidney complication (H)   Not controlled - not compliant with medications.  Restart Ozempic - check BS at least once daily.  Referrals placed.  Follow-up with me in 3 months.    - NUTRITION REFERRAL  - AMBULATORY ADULT DIABETES EDUCATOR REFERRAL; Future  - Semaglutide,0.25 or 0.5MG/DOS, (OZEMPIC, 0.25 OR 0.5 MG/DOSE,) 2 MG/1.5ML SOPN; Inject 0.25 mg Subcutaneous every 7 days  Dispense: 1.5 mL; Refill: 1  - topiramate (TOPAMAX) 25 MG tablet; Take 1 tablet (25 mg) by mouth 2 times daily  Dispense: 60 tablet; Refill: 6    2. Obesity due to excess calories, unspecified obesity severity   Increase Topamax for binge eating behavior - this has been successful with her pop  habit.    - topiramate (TOPAMAX) 25 MG tablet; Take 1 tablet (25 mg) by mouth 2 times daily  Dispense: 60 tablet; Refill: 6    3. MS (multiple sclerosis) (H)  Stable - reports fatigue - no new or changing symptoms.    4. Major depressive disorder, recurrent episode, moderate (H)  Stable    5. Hypothyroidism, unspecified type  Recheck TSH - labs previously ordered.    - levothyroxine (SYNTHROID/LEVOTHROID) 175 MCG tablet; Take 1 tablet (175 mcg) by mouth daily  Dispense: 90 tablet; Refill: 1    6. Hypertension, goal below 140/90  Controlled    7. Hyperlipidemia LDL goal <100       8. Binge eating disorder     - topiramate (TOPAMAX) 25 MG tablet; Take 1 tablet (25 mg) by mouth 2 times daily  Dispense: 60 tablet; Refill: 6    Return in about 2 weeks (around 5/27/2020) for Diabetes, Lab Work, Recheck symptoms.      Phone call duration:  21 minutes    La Anderson NP

## 2020-05-20 DIAGNOSIS — R79.89 ELEVATED LIVER FUNCTION TESTS: ICD-10-CM

## 2020-05-20 DIAGNOSIS — G35 MS (MULTIPLE SCLEROSIS) (H): ICD-10-CM

## 2020-05-20 DIAGNOSIS — E66.09 OBESITY DUE TO EXCESS CALORIES, UNSPECIFIED OBESITY SEVERITY: ICD-10-CM

## 2020-05-20 DIAGNOSIS — E11.69 TYPE 2 DIABETES MELLITUS WITH OTHER SPECIFIED COMPLICATION, WITHOUT LONG-TERM CURRENT USE OF INSULIN (H): ICD-10-CM

## 2020-05-20 DIAGNOSIS — I10 HYPERTENSION, GOAL BELOW 140/90: ICD-10-CM

## 2020-05-20 DIAGNOSIS — E11.29 TYPE 2 DIABETES MELLITUS WITH OTHER DIABETIC KIDNEY COMPLICATION (H): ICD-10-CM

## 2020-05-20 DIAGNOSIS — E03.9 HYPOTHYROIDISM, UNSPECIFIED TYPE: ICD-10-CM

## 2020-05-20 LAB
ALBUMIN SERPL-MCNC: 3.3 G/DL (ref 3.4–5)
ALP SERPL-CCNC: 226 U/L (ref 40–150)
ALT SERPL W P-5'-P-CCNC: 41 U/L (ref 0–50)
ANION GAP SERPL CALCULATED.3IONS-SCNC: 10 MMOL/L (ref 3–14)
AST SERPL W P-5'-P-CCNC: 29 U/L (ref 0–45)
BILIRUB DIRECT SERPL-MCNC: <0.1 MG/DL (ref 0–0.2)
BILIRUB SERPL-MCNC: 0.3 MG/DL (ref 0.2–1.3)
BUN SERPL-MCNC: 23 MG/DL (ref 7–30)
CALCIUM SERPL-MCNC: 9.7 MG/DL (ref 8.5–10.1)
CHLORIDE SERPL-SCNC: 95 MMOL/L (ref 94–109)
CO2 SERPL-SCNC: 25 MMOL/L (ref 20–32)
CREAT SERPL-MCNC: 0.83 MG/DL (ref 0.52–1.04)
ERYTHROCYTE [DISTWIDTH] IN BLOOD BY AUTOMATED COUNT: 13.7 % (ref 10–15)
GFR SERPL CREATININE-BSD FRML MDRD: 71 ML/MIN/{1.73_M2}
GLUCOSE SERPL-MCNC: 492 MG/DL (ref 70–99)
HBA1C MFR BLD: 12.6 % (ref 0–5.6)
HCT VFR BLD AUTO: 43.9 % (ref 35–47)
HGB BLD-MCNC: 15 G/DL (ref 11.7–15.7)
MCH RBC QN AUTO: 30.1 PG (ref 26.5–33)
MCHC RBC AUTO-ENTMCNC: 34.2 G/DL (ref 31.5–36.5)
MCV RBC AUTO: 88 FL (ref 78–100)
PLATELET # BLD AUTO: 279 10E9/L (ref 150–450)
POTASSIUM SERPL-SCNC: 4.4 MMOL/L (ref 3.4–5.3)
PROT SERPL-MCNC: 8.2 G/DL (ref 6.8–8.8)
RBC # BLD AUTO: 4.98 10E12/L (ref 3.8–5.2)
SODIUM SERPL-SCNC: 130 MMOL/L (ref 133–144)
T4 FREE SERPL-MCNC: 1.49 NG/DL (ref 0.76–1.46)
TSH SERPL DL<=0.005 MIU/L-ACNC: 5.27 MU/L (ref 0.4–4)
WBC # BLD AUTO: 6.1 10E9/L (ref 4–11)

## 2020-05-20 PROCEDURE — 85027 COMPLETE CBC AUTOMATED: CPT | Performed by: NURSE PRACTITIONER

## 2020-05-20 PROCEDURE — 80048 BASIC METABOLIC PNL TOTAL CA: CPT | Performed by: NURSE PRACTITIONER

## 2020-05-20 PROCEDURE — 83036 HEMOGLOBIN GLYCOSYLATED A1C: CPT | Performed by: NURSE PRACTITIONER

## 2020-05-20 PROCEDURE — 84439 ASSAY OF FREE THYROXINE: CPT | Performed by: NURSE PRACTITIONER

## 2020-05-20 PROCEDURE — 80076 HEPATIC FUNCTION PANEL: CPT | Performed by: NURSE PRACTITIONER

## 2020-05-20 PROCEDURE — 84443 ASSAY THYROID STIM HORMONE: CPT | Performed by: NURSE PRACTITIONER

## 2020-05-20 PROCEDURE — 36415 COLL VENOUS BLD VENIPUNCTURE: CPT | Performed by: NURSE PRACTITIONER

## 2020-05-21 ENCOUNTER — TELEPHONE (OUTPATIENT)
Dept: FAMILY MEDICINE | Facility: CLINIC | Age: 70
End: 2020-05-21

## 2020-05-21 NOTE — TELEPHONE ENCOUNTER
Diabetes Education Scheduling Outreach #1:    Call to patient to schedule. Left message with phone number to call to schedule.    Plan for 2nd outreach attempt within 2 business days.    [facility]  Diabetes and Nutrition Scheduling

## 2020-05-26 NOTE — TELEPHONE ENCOUNTER
Diabetes Education Scheduling Outreach #2:    Call to patient to schedule. Left message with phone number to call to schedule.    Kaylie Nieto  [place] [facility]  Diabetes and Nutrition Scheduling

## 2020-05-29 ENCOUNTER — TELEPHONE (OUTPATIENT)
Dept: FAMILY MEDICINE | Facility: CLINIC | Age: 70
End: 2020-05-29

## 2020-05-29 NOTE — TELEPHONE ENCOUNTER
----- Message from La Anderson NP sent at 5/13/2020  1:31 PM CDT -----  Regarding: Due for labs and US  Call patient and set up lab appointment (these have been ordered) and US for thyroid.    SINDHU Gustafson

## 2020-08-04 ENCOUNTER — OFFICE VISIT (OUTPATIENT)
Dept: FAMILY MEDICINE | Facility: CLINIC | Age: 70
End: 2020-08-04
Payer: COMMERCIAL

## 2020-08-04 VITALS
TEMPERATURE: 98.2 F | DIASTOLIC BLOOD PRESSURE: 88 MMHG | HEART RATE: 82 BPM | OXYGEN SATURATION: 98 % | SYSTOLIC BLOOD PRESSURE: 136 MMHG | HEIGHT: 68 IN | BODY MASS INDEX: 34.04 KG/M2 | RESPIRATION RATE: 16 BRPM | WEIGHT: 224.6 LBS

## 2020-08-04 DIAGNOSIS — F50.819 BINGE EATING DISORDER: ICD-10-CM

## 2020-08-04 DIAGNOSIS — E11.65 TYPE 2 DIABETES MELLITUS WITH HYPERGLYCEMIA, WITHOUT LONG-TERM CURRENT USE OF INSULIN (H): ICD-10-CM

## 2020-08-04 DIAGNOSIS — E11.29 TYPE 2 DIABETES MELLITUS WITH OTHER DIABETIC KIDNEY COMPLICATION (H): Primary | ICD-10-CM

## 2020-08-04 DIAGNOSIS — R79.89 ELEVATED LIVER FUNCTION TESTS: ICD-10-CM

## 2020-08-04 DIAGNOSIS — I10 HYPERTENSION, GOAL BELOW 140/90: ICD-10-CM

## 2020-08-04 DIAGNOSIS — Z12.31 ENCOUNTER FOR SCREENING MAMMOGRAM FOR MALIGNANT NEOPLASM OF BREAST: ICD-10-CM

## 2020-08-04 DIAGNOSIS — M25.511 ACUTE PAIN OF RIGHT SHOULDER: ICD-10-CM

## 2020-08-04 DIAGNOSIS — Z12.39 SCREENING FOR MALIGNANT NEOPLASM OF BREAST: ICD-10-CM

## 2020-08-04 DIAGNOSIS — E66.09 OBESITY DUE TO EXCESS CALORIES, UNSPECIFIED OBESITY SEVERITY: ICD-10-CM

## 2020-08-04 DIAGNOSIS — Z12.11 SPECIAL SCREENING FOR MALIGNANT NEOPLASMS, COLON: ICD-10-CM

## 2020-08-04 PROCEDURE — 99214 OFFICE O/P EST MOD 30 MIN: CPT | Performed by: NURSE PRACTITIONER

## 2020-08-04 RX ORDER — TOPIRAMATE 25 MG/1
75 TABLET, FILM COATED ORAL AT BEDTIME
Qty: 90 TABLET | Refills: 6 | Status: SHIPPED | OUTPATIENT
Start: 2020-08-04 | End: 2021-03-05

## 2020-08-04 RX ORDER — LIRAGLUTIDE 6 MG/ML
1.2 INJECTION SUBCUTANEOUS DAILY
Qty: 3 ML | Refills: 1 | Status: SHIPPED | OUTPATIENT
Start: 2020-08-04 | End: 2020-09-21

## 2020-08-04 RX ORDER — PEN NEEDLE, DIABETIC 31 GX5/16"
NEEDLE, DISPOSABLE MISCELLANEOUS
Qty: 30 EACH | Status: SHIPPED | OUTPATIENT
Start: 2020-08-04 | End: 2021-03-05

## 2020-08-04 ASSESSMENT — MIFFLIN-ST. JEOR: SCORE: 1578.31

## 2020-08-04 NOTE — LETTER
[facility]  [address] [address]  Phone: [phone]    August 4, 2020        Cyndy Wang  [address] [address]  [place] [address]          To whom it may concern:    RE: Cyndy Wang    Patient was seen and treated today at our clinic for acute injury of left shoulder.  She should be off work until 8/7/2020..  May return to work 8/7/2020 without restriction.    Please contact me for questions or concerns.      Sincerely,        La Anderson NP

## 2020-08-04 NOTE — PROGRESS NOTES
Subjective     Cyndy Wang is a 70 year old female who presents to clinic today for the following health issues:    HPI       Diabetes Follow-up    How often are you checking your blood sugar? One time daily  What time of day are you checking your blood sugars (select all that apply)?  Before and after meals  Have you had any blood sugars above 200?  Yes   Have you had any blood sugars below 70?  No    What symptoms do you notice when your blood sugar is low?  None and Not applicable    What concerns do you have today about your diabetes? None and Blood sugar is often over 200. A lot in the 500s. States that the last time she had a blood sugar in the 500s was a couple of weeks ago. She states that she stopped drinking lemonade since having these numbers      Do you have any of these symptoms? (Select all that apply)  Numbness in feet and Burning in feet    Have you had a diabetic eye exam in the last 12 months? No    Average BS now since stopping Lemonades have been around 200's.  Taking Topamax 50 mg once daily.  Reports not working as well now.    Not using Ozempic currently - unsure why.  Reports eating out of boredom - requesting diet pill.    BP Readings from Last 2 Encounters:   08/04/20 136/88   01/27/20 124/81     Hemoglobin A1C (%)   Date Value   05/20/2020 12.6 (H)   01/08/2020 13.2 (H)     LDL Cholesterol Calculated (mg/dL)   Date Value   01/08/2020     Cannot estimate LDL when triglyceride exceeds 400 mg/dL   06/26/2019 102 (H)     LDL Cholesterol Direct (mg/dL)   Date Value   01/08/2020 149 (H)            How many servings of fruits and vegetables do you eat daily?  2-3    On average, how many sweetened beverages do you drink each day (Examples: soda, juice, sweet tea, etc.  Do NOT count diet or artificially sweetened beverages)?   0    How many days per week do you exercise enough to make your heart beat faster? 3 or less    How many minutes a day do you exercise enough to make your heart beat faster?  9 or less  How many days per week do you miss taking your medication? 1    What makes it hard for you to take your medications?  remembering to take    Musculoskeletal problem/pain      Duration: x 4-6 weeks ago she fell on it.     Description  Location: right shoulder    Intensity:  mild     Accompanying signs and symptoms: last night states that there might have been a knot in it.     History  Previous similar problem: no   Previous evaluation:  none    Precipitating or alleviating factors:  Trauma or overuse: YES- pt fell on the right shoulder  Aggravating factors include: repetitive activities.     Therapies tried and outcome: CBD cream and some OTC anti inflammatory.      Hyperlipidemia Follow-Up      Are you regularly taking any medication or supplement to lower your cholesterol?   No    Are you having muscle aches or other side effects that you think could be caused by your cholesterol lowering medication?  No    Hypertension Follow-up      Do you check your blood pressure regularly outside of the clinic? No     Are you following a low salt diet? No    Are your blood pressures ever more than 140 on the top number (systolic) OR more   than 90 on the bottom number (diastolic), for example 140/90? No      How many servings of fruits and vegetables do you eat daily?  0-1    On average, how many sweetened beverages do you drink each day (Examples: soda, juice, sweet tea, etc.  Do NOT count diet or artificially sweetened beverages)?   4    How many days per week do you exercise enough to make your heart beat faster? 3 or less    How many minutes a day do you exercise enough to make your heart beat faster? 9 or less  How many days per week do you miss taking your medication? 4    What makes it hard for you to take your medications?  remembering to take      Patient Active Problem List   Diagnosis     MS (multiple sclerosis) (H)     Hyperlipidemia LDL goal <100     Vitamin D deficiency disease     Health Care Home      Advanced directives, counseling/discussion     Generalized anxiety disorder     Type 2 diabetes mellitus with other diabetic kidney complication (H)     Obesity due to excess calories, unspecified obesity severity     Binge eating disorder     Hypertension, goal below 140/90     Hypothyroidism, unspecified type     Major depressive disorder, recurrent episode, moderate (H)     Past Surgical History:   Procedure Laterality Date     REPAIR TENDON FINGER(S) Right 5/22/2015    Procedure: REPAIR TENDON FINGER(S);  Surgeon: Xavier Paulson MD;  Location: [facility]       Social History     Tobacco Use     Smoking status: Former Smoker     Types: Cigarettes     Smokeless tobacco: Never Used     Tobacco comment: 1-2 cigarettes occasionally   Substance Use Topics     Alcohol use: No     Alcohol/week: 0.0 standard drinks     Family History   Problem Relation Age of Onset     Neurologic Disorder Mother         Stroke     Heart Disease Maternal Grandfather      C.A.JOCELINE. Maternal Grandmother      Breast Cancer No family hx of      Cancer - colorectal No family hx of      Melanoma No family hx of          Current Outpatient Medications   Medication Sig Dispense Refill     aspirin (ASA) 81 MG chewable tablet Take 1 tablet (81 mg) by mouth daily       blood glucose (NO BRAND SPECIFIED) lancets standard 1 each by In Vitro route 3 times daily Use to test blood sugar daily whatever brand her insurance will pay for 100 each 1     blood glucose (NO BRAND SPECIFIED) test strip 1 strip by In Vitro route 3 times daily Use to test blood sugars daily. What ever her insurance will pay for 100 strip 1     blood glucose monitoring (NO BRAND SPECIFIED) meter device kit by In Vitro route 3 times daily Use to test blood sugar daily. What ever her insurance will pay for 1 kit 0     insulin pen needle (31G X 5 MM) 31G X 5 MM miscellaneous Use 1 pen needles daily or as directed. 30 each 3     insulin pen needle (B-D U/F) 31G X 8 MM miscellaneous Use  once daily or as directed. 30 each prn     levothyroxine (SYNTHROID/LEVOTHROID) 175 MCG tablet Take 1 tablet (175 mcg) by mouth daily 90 tablet 1     liraglutide (VICTOZA) 18 MG/3ML solution Inject 1.2 mg Subcutaneous daily 3 mL 1     losartan (COZAAR) 50 MG tablet TAKE ONE TABLET BY MOUTH ONCE DAILY 90 tablet 3     pravastatin (PRAVACHOL) 20 MG tablet Take 1 tablet (20 mg) by mouth daily 90 tablet 3     topiramate (TOPAMAX) 25 MG tablet Take 3 tablets (75 mg) by mouth At Bedtime 90 tablet 6     traMADol (ULTRAM) 50 MG tablet 1 tab po q 4-6 hours PRN pain 10 tablet 0     venlafaxine (EFFEXOR-XR) 75 MG 24 hr capsule TAKE THREE CAPSULES BY MOUTH EVERY  capsule 3     triamcinolone (KENALOG) 0.1 % cream Apply sparingly to affected area three times daily for 14 days. (Patient not taking: Reported on 1/17/2019) 15 g 0     Allergies   Allergen Reactions     Sulfa Drugs Difficulty breathing     Lisinopril Cough     Penicillins Difficulty breathing     Recent Labs   Lab Test 05/20/20  1515 01/08/20  1150 06/26/19  1521  08/24/17  1031   A1C 12.6* 13.2* 9.9*   < > 8.2*   LDL  --  Cannot estimate LDL when triglyceride exceeds 400 mg/dL  149* 102*  --  141*   HDL  --  38* 45*  --  37*   TRIG  --  480* 178*  --  171*   ALT 41 61* 44  --   --    CR 0.83  --  0.91  --  0.90   GFRESTIMATED 71  --  64  --  62   GFRESTBLACK 82  --  74  --  75   POTASSIUM 4.4  --  4.3  --  4.5   TSH 5.27*  --  1.25  --  2.25    < > = values in this interval not displayed.      BP Readings from Last 3 Encounters:   08/04/20 136/88   01/27/20 124/81   01/08/20 138/80    Wt Readings from Last 3 Encounters:   08/04/20 101.9 kg (224 lb 9.6 oz)   01/27/20 100.7 kg (222 lb)   01/08/20 100.7 kg (222 lb)                    Reviewed and updated as needed this visit by Provider         Review of Systems   Constitutional, HEENT, cardiovascular, pulmonary, GI, , musculoskeletal, neuro, skin, endocrine and psych systems are negative, except as otherwise  "noted.      Objective    /88 (BP Location: Left arm, Patient Position: Sitting, Cuff Size: Adult Regular)   Pulse 82   Temp 98.2  F (36.8  C) (Tympanic)   Resp 16   Ht 1.721 m (5' 7.75\")   Wt 101.9 kg (224 lb 9.6 oz)   SpO2 98%   BMI 34.40 kg/m    Body mass index is 34.4 kg/m .  Physical Exam   GENERAL: healthy, alert and no distress  NECK: no adenopathy, no asymmetry, masses, or scars and thyroid normal to palpation  RESP: lungs clear to auscultation - no rales, rhonchi or wheezes  CV: regular rate and rhythm, normal S1 S2, no S3 or S4, no murmur, click or rub, no peripheral edema and peripheral pulses strong  ABDOMEN: soft, nontender, no hepatosplenomegaly, no masses and bowel sounds normal  MS: no gross musculoskeletal defects noted, no edema    Diagnostic Test Results:  Labs reviewed in Epic        Assessment & Plan     1. Type 2 diabetes mellitus with other diabetic kidney complication (H)  Stop Ozempic - start Victoza - to help with weight loss and A1C.  Discussed diet and stopping pop.  Increase Topamax to help with this and moods.    - liraglutide (VICTOZA) 18 MG/3ML solution; Inject 1.2 mg Subcutaneous daily  Dispense: 3 mL; Refill: 1  - topiramate (TOPAMAX) 25 MG tablet; Take 3 tablets (75 mg) by mouth At Bedtime  Dispense: 90 tablet; Refill: 6  - Hemoglobin A1c; Future  - Basic metabolic panel; Future  - Albumin Random Urine Quantitative with Creat Ratio; Future    2. Type 2 diabetes mellitus with hyperglycemia, without long-term current use of insulin (H)   Not controlled - last A1C > 12.    - insulin pen needle (B-D U/F) 31G X 8 MM miscellaneous; Use once daily or as directed.  Dispense: 30 each; Refill: prn    3. Elevated liver function tests     - Hepatic panel (Albumin, ALT, AST, Bili, Alk Phos, TP); Future    4. Obesity due to excess calories, unspecified obesity severity  Discussed weight loss and diet.    - topiramate (TOPAMAX) 25 MG tablet; Take 3 tablets (75 mg) by mouth At Bedtime  " "Dispense: 90 tablet; Refill: 6    5. Hypertension, goal below 140/90  Controlled.    - Basic metabolic panel; Future    6. Acute pain of right shoulder  Improving per patient.  Given handout on stretches at home.  Letter for work for this week.  Follow up if not improving.      7. Binge eating disorder     - topiramate (TOPAMAX) 25 MG tablet; Take 3 tablets (75 mg) by mouth At Bedtime  Dispense: 90 tablet; Refill: 6    8. Screening for malignant neoplasm of breast     - MA Screen Bilateral w/Rizwan; Future    9. Encounter for screening mammogram for malignant neoplasm of breast      - MA Screen Bilateral w/Rizwan; Future    10. Special screening for malignant neoplasms, colon     - Fecal colorectal cancer screen (FIT); Future     BMI:   Estimated body mass index is 34.4 kg/m  as calculated from the following:    Height as of this encounter: 1.721 m (5' 7.75\").    Weight as of this encounter: 101.9 kg (224 lb 9.6 oz).   Weight management plan: Discussed healthy diet and exercise guidelines        Patient Instructions   Start Victoza 1.2 mg once daily - injections. This will help with diabetes and weight loss.    Increase Topamax to 75 mg (3 X 25 mg tablets) at bedtime to help with cravings/pop.    Recheck with me in clinic in 1-2 months.  Will do labs at that time.  Continue exercise and diet changes.    SINDHU Gustafson    At your visit today, we discussed your risk for falls and preventive options.    Fall Prevention  Falls often occur due to slipping, tripping or losing your balance. Millions of people fall every year and injure themselves. Here are ways to reduce your risk of falling again.    Think about your fall, was there anything that caused your fall that can be fixed, removed, or replaced?    Make your home safe by keeping walkways clear of objects you may trip over, such as electric cords.    Use non-slip pads under rugs. Don't use area rugs or small throw rugs.    Use non-slip mats in bathtubs and " showers.    Install handrails and lights on staircases. The handrails should be on both sides of the stairs.    Don't walk in poorly lit areas.    Don't stand on chairs or wobbly ladders.    Use caution when reaching overhead or looking upward. This position can cause a loss of balance.    Be sure your shoes fit properly, have non-slip bottoms and are in good condition.     Wear shoes both inside and out. Don't go barefoot or wear slippers.    Be cautious when going up and down stairs, curbs, and when walking on uneven sidewalks.    If your balance is poor, consider using a cane or walker.    If your fall was related to alcohol use, stop or limit alcohol intake.     If your fall was related to use of sleeping medicines, talk to your healthcare provider about this. You may need to reduce your dosage at bedtime if you awaken during the night to go to the bathroom.      To reduce the need for nighttime bathroom trips:  ? Don't drink fluids for several hours before going to bed  ? Empty your bladder before going to bed  ? Men can keep a urinal at the bedside    Stay as active as you can. Balance, flexibility, strength, and endurance all come from exercise. They all play a role in preventing falls. Ask your healthcare provider which types of activity are right for you.    Get your vision checked on a regular basis.    If you have pets, know where they are before you stand up or walk so you don't trip over them.    Use night lights.    Go over all your medicines with a pharmacist or other healthcare provider to see if any of them could make you more likely to fall.  Date Last Reviewed: 4/1/2018 2000-2019 [employer]. [address] [place] [id number]. All rights reserved. This information is not intended as a substitute for professional medical care. Always follow your healthcare professional's instructions.            Return in about 4 weeks (around 9/1/2020) for Recheck symptoms,  Diabetes.    La Anderson NP  [facility]

## 2020-08-04 NOTE — PATIENT INSTRUCTIONS
Start Victoza 1.2 mg once daily - injections. This will help with diabetes and weight loss.    Increase Topamax to 75 mg (3 X 25 mg tablets) at bedtime to help with cravings/pop.    Recheck with me in clinic in 1-2 months.  Will do labs at that time.  Continue exercise and diet changes.    SINDHU Gustafson    At your visit today, we discussed your risk for falls and preventive options.    Fall Prevention  Falls often occur due to slipping, tripping or losing your balance. Millions of people fall every year and injure themselves. Here are ways to reduce your risk of falling again.    Think about your fall, was there anything that caused your fall that can be fixed, removed, or replaced?    Make your home safe by keeping walkways clear of objects you may trip over, such as electric cords.    Use non-slip pads under rugs. Don't use area rugs or small throw rugs.    Use non-slip mats in bathtubs and showers.    Install handrails and lights on staircases. The handrails should be on both sides of the stairs.    Don't walk in poorly lit areas.    Don't stand on chairs or wobbly ladders.    Use caution when reaching overhead or looking upward. This position can cause a loss of balance.    Be sure your shoes fit properly, have non-slip bottoms and are in good condition.     Wear shoes both inside and out. Don't go barefoot or wear slippers.    Be cautious when going up and down stairs, curbs, and when walking on uneven sidewalks.    If your balance is poor, consider using a cane or walker.    If your fall was related to alcohol use, stop or limit alcohol intake.     If your fall was related to use of sleeping medicines, talk to your healthcare provider about this. You may need to reduce your dosage at bedtime if you awaken during the night to go to the bathroom.      To reduce the need for nighttime bathroom trips:  ? Don't drink fluids for several hours before going to bed  ? Empty your bladder before going to  bed  ? Men can keep a urinal at the bedside    Stay as active as you can. Balance, flexibility, strength, and endurance all come from exercise. They all play a role in preventing falls. Ask your healthcare provider which types of activity are right for you.    Get your vision checked on a regular basis.    If you have pets, know where they are before you stand up or walk so you don't trip over them.    Use night lights.    Go over all your medicines with a pharmacist or other healthcare provider to see if any of them could make you more likely to fall.  Date Last Reviewed: 4/1/2018 2000-2019 [employer]. [address] [place] [address], [place] [address]. All rights reserved. This information is not intended as a substitute for professional medical care. Always follow your healthcare professional's instructions.

## 2020-09-10 NOTE — PROGRESS NOTES
History of Present Illness - Cyndy Wang is a 70 year old female here to see me for the first time at the consult of [facility] due to hearing loss.    She noticed that she started to have more problems with hearing over the last 2 years.  If she cannot look at them it is hard to understand.  She has no history of ear disease at all, no previous ear surgery. No chemo or radiation therapy.  No major head injury.    She does have a past medical history significant for stable MS, Type 2 DM, and hypothyroidism.    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 (H)     Obesity due to excess calories, unspecified obesity severity     Binge eating disorder     Hypertension, goal below 140/90     Hypothyroidism, unspecified type     Major depressive disorder, recurrent episode, moderate (H)       Current Medications -   Current Outpatient Medications:      aspirin (ASA) 81 MG chewable tablet, Take 1 tablet (81 mg) by mouth daily, Disp: , Rfl:      blood glucose (NO BRAND SPECIFIED) lancets standard, 1 each by In Vitro route 3 times daily Use to test blood sugar daily whatever brand her insurance will pay for, Disp: 100 each, Rfl: 1     blood glucose (NO BRAND SPECIFIED) test strip, 1 strip by In Vitro route 3 times daily Use to test blood sugars daily. What ever her insurance will pay for, Disp: 100 strip, Rfl: 1     blood glucose monitoring (NO BRAND SPECIFIED) meter device kit, by In Vitro route 3 times daily Use to test blood sugar daily. What ever her insurance will pay for, Disp: 1 kit, Rfl: 0     insulin pen needle (31G X 5 MM) 31G X 5 MM miscellaneous, Use 1 pen needles daily or as directed., Disp: 30 each, Rfl: 3     insulin pen  needle (B-D U/F) 31G X 8 MM miscellaneous, Use once daily or as directed., Disp: 30 each, Rfl: prn     levothyroxine (SYNTHROID/LEVOTHROID) 175 MCG tablet, Take 1 tablet (175 mcg) by mouth daily, Disp: 90 tablet, Rfl: 1     liraglutide (VICTOZA) 18 MG/3ML solution, Inject 1.2 mg Subcutaneous daily, Disp: 3 mL, Rfl: 1     losartan (COZAAR) 50 MG tablet, TAKE ONE TABLET BY MOUTH ONCE DAILY, Disp: 90 tablet, Rfl: 3     pravastatin (PRAVACHOL) 20 MG tablet, Take 1 tablet (20 mg) by mouth daily, Disp: 90 tablet, Rfl: 3     topiramate (TOPAMAX) 25 MG tablet, Take 3 tablets (75 mg) by mouth At Bedtime, Disp: 90 tablet, Rfl: 6     traMADol (ULTRAM) 50 MG tablet, 1 tab po q 4-6 hours PRN pain, Disp: 10 tablet, Rfl: 0     triamcinolone (KENALOG) 0.1 % cream, Apply sparingly to affected area three times daily for 14 days. (Patient not taking: Reported on 1/17/2019), Disp: 15 g, Rfl: 0     venlafaxine (EFFEXOR-XR) 75 MG 24 hr capsule, TAKE THREE CAPSULES BY MOUTH EVERY DAY, Disp: 270 capsule, Rfl: 3    Allergies -   Allergies   Allergen Reactions     Sulfa Drugs Difficulty breathing     Lisinopril Cough     Penicillins Difficulty breathing       Social History -   Social History     Socioeconomic History     Marital status:      Spouse name: Not on file     Number of children: Not on file     Years of education: Not on file     Highest education level: Not on file   Occupational History     Not on file   Social Needs     Financial resource strain: Not on file     Food insecurity     Worry: Not on file     Inability: Not on file     Transportation needs     Medical: Not on file     Non-medical: Not on file   Tobacco Use     Smoking status: Former Smoker     Types: Cigarettes     Smokeless tobacco: Never Used     Tobacco comment: 1-2 cigarettes occasionally   Substance and Sexual Activity     Alcohol use: No     Alcohol/week: 0.0 standard drinks     Drug use: No     Sexual activity: Not Currently   Lifestyle     Physical  activity     Days per week: Not on file     Minutes per session: Not on file     Stress: Not on file   Relationships     Social connections     Talks on phone: Not on file     Gets together: Not on file     Attends Sabianism service: Not on file     Active member of club or organization: Not on file     Attends meetings of clubs or organizations: Not on file     Relationship status: Not on file     Intimate partner violence     Fear of current or ex partner: Not on file     Emotionally abused: Not on file     Physically abused: Not on file     Forced sexual activity: Not on file   Other Topics Concern     Parent/sibling w/ CABG, MI or angioplasty before 65F 55M? No   Social History Narrative     Not on file       Family History -   Family History   Problem Relation Age of Onset     Neurologic Disorder Mother         Stroke     Heart Disease Maternal Grandfather      C.A.D. Maternal Grandmother      Breast Cancer No family hx of      Cancer - colorectal No family hx of      Melanoma No family hx of        Review of Systems - As per HPI and PMHx, otherwise 10+ system review of the head and neck, and general constitution is negative.    Physical Exam  BP (!) 159/104 (BP Location: Right arm, Patient Position: Chair, Cuff Size: Adult Regular)   Pulse 89   Temp 97.1  F (36.2  C) (Tympanic)   Resp 16     General - The patient is well nourished and well developed, and appears to have good nutritional status.  Alert and oriented to person and place, answers questions and cooperates with examination appropriately.   Head and Face - Normocephalic and atraumatic, with no gross asymmetry noted of the contour of the facial features.  The facial nerve is intact, with strong symmetric movements.  Voice and Breathing - The patient was breathing comfortably without the use of accessory muscles. There was no wheezing, stridor, or stertor.  The patients voice was clear and strong, and had appropriate pitch and quality.  Ears - The  tympanic membranes are normal in appearance, bony landmarks are intact.  No retraction, perforation, or masses.  No fluid or purulence was seen in the external canal or the middle ear. No evidence of infection of the middle ear or external canal, cerumen was normal in appearance.  Eyes - Extraocular movements intact, and the pupils were reactive to light.  Sclera were not icteric or injected, conjunctiva were pink and moist.  Mouth - Examination of the oral cavity showed pink, healthy oral mucosa. No lesions or ulcerations noted.  The tongue was mobile and midline, and the dentition were in good condition.    Throat - The walls of the oropharynx were smooth, pink, moist, symmetric, and had no lesions or ulcerations.  The tonsillar pillars and soft palate were symmetric.  The uvula was midline on elevation.    Neck - Normal midline excursion of the laryngotracheal complex during swallowing.  Full range of motion on passive movement.  Palpation of the occipital, submental, submandibular, internal jugular chain, and supraclavicular nodes did not demonstrate any abnormal lymph nodes or masses.  The carotid pulse was palpable bilaterally.  Palpation of the thyroid was soft and smooth, with no nodules or goiter appreciated.  The trachea was mobile and midline.  Nose - External contour is symmetric, no gross deflection or scars.  Nasal mucosa is pink and moist with no abnormal mucus.  The septum was midline and non-obstructive, turbinates of normal size and position.  No polyps, masses, or purulence noted on examination.    Audiologic Studies - An audiogram and tympanogram were performed today as part of the evaluation and personally reviewed. The tympanogram shows a normal Type A curve, with normal canal volume and middle ear pressure.  There is no sign of eustachian tube dysfunction or middle ear effusion.  The audiogram showed moderate to severe sensorineural hearing loss bilaterally, about 10dB worse in the LEFT ear  above 3000Hz.  No conductive hearing loss.      A/P - Cyndy Wang is a 70 year old female  (H90.3) Sensorineural hearing loss (SNHL) of both ears  (primary encounter diagnosis)  (H93.13) Tinnitus, bilateral    Based on the history, audiogram, and ear exam, I don't find any evidence of medical or surgical disease that would have caused the hearing loss.  Although noise exposure could be a factor, and despite her complex medical history, based on the timeline and audiology studies, I suspect that this is primarily genetic/presbycusis.    We discussed the natural history of the steady loss of human hearing, and things to help.  I certainly recommend considering hearing aids, and they have my medical clearance to look into being fitted, and information has been provided.    Finally, safety considerations such as loud smoke detectors, alarm clocks, and special aids for the hearing impaired using phones and television were also discussed.

## 2020-09-11 ENCOUNTER — OFFICE VISIT (OUTPATIENT)
Dept: OTOLARYNGOLOGY | Facility: CLINIC | Age: 70
End: 2020-09-11
Attending: NURSE PRACTITIONER
Payer: COMMERCIAL

## 2020-09-11 ENCOUNTER — OFFICE VISIT (OUTPATIENT)
Dept: AUDIOLOGY | Facility: CLINIC | Age: 70
End: 2020-09-11
Attending: NURSE PRACTITIONER
Payer: COMMERCIAL

## 2020-09-11 VITALS
DIASTOLIC BLOOD PRESSURE: 104 MMHG | SYSTOLIC BLOOD PRESSURE: 159 MMHG | TEMPERATURE: 97.1 F | RESPIRATION RATE: 16 BRPM | HEART RATE: 89 BPM

## 2020-09-11 DIAGNOSIS — H93.13 TINNITUS, BILATERAL: ICD-10-CM

## 2020-09-11 DIAGNOSIS — H90.3 SENSORINEURAL HEARING LOSS, BILATERAL: Primary | ICD-10-CM

## 2020-09-11 DIAGNOSIS — H90.3 SENSORINEURAL HEARING LOSS (SNHL) OF BOTH EARS: Primary | ICD-10-CM

## 2020-09-11 PROCEDURE — 92557 COMPREHENSIVE HEARING TEST: CPT | Performed by: AUDIOLOGIST

## 2020-09-11 PROCEDURE — 99207 ZZC NO CHARGE LOS: CPT | Performed by: AUDIOLOGIST

## 2020-09-11 PROCEDURE — 99203 OFFICE O/P NEW LOW 30 MIN: CPT | Performed by: OTOLARYNGOLOGY

## 2020-09-11 PROCEDURE — 92550 TYMPANOMETRY & REFLEX THRESH: CPT | Performed by: AUDIOLOGIST

## 2020-09-11 NOTE — LETTER
9/11/2020         RE: Cyndy Wang  [address]  [facility] [place] [id number]        Dear Colleague,    Thank you for referring your patient, Cyndy Wang, to the [facility]. Please see a copy of my visit note below.    History of Present Illness - Cyndy Wang is a 70 year old female here to see me for the first time at the consult of [facility] due to hearing loss.    She noticed that she started to have more problems with hearing over the last 2 years.  If she cannot look at them it is hard to understand.  She has no history of ear disease at all, no previous ear surgery. No chemo or radiation therapy.  No major head injury.    She does have a past medical history significant for stable MS, Type 2 DM, and hypothyroidism.    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 (H)     Obesity due to excess calories, unspecified obesity severity     Binge eating disorder     Hypertension, goal below 140/90     Hypothyroidism, unspecified type     Major depressive disorder, recurrent episode, moderate (H)       Current Medications -   Current Outpatient Medications:      aspirin (ASA) 81 MG chewable tablet, Take 1 tablet (81 mg) by mouth daily, Disp: , Rfl:      blood glucose (NO BRAND SPECIFIED) lancets standard, 1 each by In Vitro route 3 times daily Use to test blood sugar daily whatever brand her insurance will pay for, Disp: 100 each, Rfl: 1     blood glucose (NO BRAND SPECIFIED) test strip, 1 strip by In Vitro route 3 times daily Use to test blood sugars daily. What ever her insurance will pay for, Disp: 100 strip, Rfl: 1     blood glucose monitoring (NO BRAND SPECIFIED) meter device kit, by In Vitro  route 3 times daily Use to test blood sugar daily. What ever her insurance will pay for, Disp: 1 kit, Rfl: 0     insulin pen needle (31G X 5 MM) 31G X 5 MM miscellaneous, Use 1 pen needles daily or as directed., Disp: 30 each, Rfl: 3     insulin pen needle (B-D U/F) 31G X 8 MM miscellaneous, Use once daily or as directed., Disp: 30 each, Rfl: prn     levothyroxine (SYNTHROID/LEVOTHROID) 175 MCG tablet, Take 1 tablet (175 mcg) by mouth daily, Disp: 90 tablet, Rfl: 1     liraglutide (VICTOZA) 18 MG/3ML solution, Inject 1.2 mg Subcutaneous daily, Disp: 3 mL, Rfl: 1     losartan (COZAAR) 50 MG tablet, TAKE ONE TABLET BY MOUTH ONCE DAILY, Disp: 90 tablet, Rfl: 3     pravastatin (PRAVACHOL) 20 MG tablet, Take 1 tablet (20 mg) by mouth daily, Disp: 90 tablet, Rfl: 3     topiramate (TOPAMAX) 25 MG tablet, Take 3 tablets (75 mg) by mouth At Bedtime, Disp: 90 tablet, Rfl: 6     traMADol (ULTRAM) 50 MG tablet, 1 tab po q 4-6 hours PRN pain, Disp: 10 tablet, Rfl: 0     triamcinolone (KENALOG) 0.1 % cream, Apply sparingly to affected area three times daily for 14 days. (Patient not taking: Reported on 1/17/2019), Disp: 15 g, Rfl: 0     venlafaxine (EFFEXOR-XR) 75 MG 24 hr capsule, TAKE THREE CAPSULES BY MOUTH EVERY DAY, Disp: 270 capsule, Rfl: 3    Allergies -   Allergies   Allergen Reactions     Sulfa Drugs Difficulty breathing     Lisinopril Cough     Penicillins Difficulty breathing       Social History -   Social History     Socioeconomic History     Marital status:      Spouse name: Not on file     Number of children: Not on file     Years of education: Not on file     Highest education level: Not on file   Occupational History     Not on file   Social Needs     Financial resource strain: Not on file     Food insecurity     Worry: Not on file     Inability: Not on file     Transportation needs     Medical: Not on file     Non-medical: Not on file   Tobacco Use     Smoking status: Former Smoker     Types: Cigarettes      Smokeless tobacco: Never Used     Tobacco comment: 1-2 cigarettes occasionally   Substance and Sexual Activity     Alcohol use: No     Alcohol/week: 0.0 standard drinks     Drug use: No     Sexual activity: Not Currently   Lifestyle     Physical activity     Days per week: Not on file     Minutes per session: Not on file     Stress: Not on file   Relationships     Social connections     Talks on phone: Not on file     Gets together: Not on file     Attends Synagogue service: Not on file     Active member of club or organization: Not on file     Attends meetings of clubs or organizations: Not on file     Relationship status: Not on file     Intimate partner violence     Fear of current or ex partner: Not on file     Emotionally abused: Not on file     Physically abused: Not on file     Forced sexual activity: Not on file   Other Topics Concern     Parent/sibling w/ CABG, MI or angioplasty before 65F 55M? No   Social History Narrative     Not on file       Family History -   Family History   Problem Relation Age of Onset     Neurologic Disorder Mother         Stroke     Heart Disease Maternal Grandfather      C.A.D. Maternal Grandmother      Breast Cancer No family hx of      Cancer - colorectal No family hx of      Melanoma No family hx of        Review of Systems - As per HPI and PMHx, otherwise 10+ system review of the head and neck, and general constitution is negative.    Physical Exam  BP (!) 159/104 (BP Location: Right arm, Patient Position: Chair, Cuff Size: Adult Regular)   Pulse 89   Temp 97.1  F (36.2  C) (Tympanic)   Resp 16     General - The patient is well nourished and well developed, and appears to have good nutritional status.  Alert and oriented to person and place, answers questions and cooperates with examination appropriately.   Head and Face - Normocephalic and atraumatic, with no gross asymmetry noted of the contour of the facial features.  The facial nerve is intact, with strong  symmetric movements.  Voice and Breathing - The patient was breathing comfortably without the use of accessory muscles. There was no wheezing, stridor, or stertor.  The patients voice was clear and strong, and had appropriate pitch and quality.  Ears - The tympanic membranes are normal in appearance, bony landmarks are intact.  No retraction, perforation, or masses.  No fluid or purulence was seen in the external canal or the middle ear. No evidence of infection of the middle ear or external canal, cerumen was normal in appearance.  Eyes - Extraocular movements intact, and the pupils were reactive to light.  Sclera were not icteric or injected, conjunctiva were pink and moist.  Mouth - Examination of the oral cavity showed pink, healthy oral mucosa. No lesions or ulcerations noted.  The tongue was mobile and midline, and the dentition were in good condition.    Throat - The walls of the oropharynx were smooth, pink, moist, symmetric, and had no lesions or ulcerations.  The tonsillar pillars and soft palate were symmetric.  The uvula was midline on elevation.    Neck - Normal midline excursion of the laryngotracheal complex during swallowing.  Full range of motion on passive movement.  Palpation of the occipital, submental, submandibular, internal jugular chain, and supraclavicular nodes did not demonstrate any abnormal lymph nodes or masses.  The carotid pulse was palpable bilaterally.  Palpation of the thyroid was soft and smooth, with no nodules or goiter appreciated.  The trachea was mobile and midline.  Nose - External contour is symmetric, no gross deflection or scars.  Nasal mucosa is pink and moist with no abnormal mucus.  The septum was midline and non-obstructive, turbinates of normal size and position.  No polyps, masses, or purulence noted on examination.    Audiologic Studies - An audiogram and tympanogram were performed today as part of the evaluation and personally reviewed. The tympanogram shows a  normal Type A curve, with normal canal volume and middle ear pressure.  There is no sign of eustachian tube dysfunction or middle ear effusion.  The audiogram showed moderate to severe sensorineural hearing loss bilaterally, about 10dB worse in the LEFT ear above 3000Hz.  No conductive hearing loss.      A/P - Cyndy Wang is a 70 year old female  (H90.3) Sensorineural hearing loss (SNHL) of both ears  (primary encounter diagnosis)  (H93.13) Tinnitus, bilateral    Based on the history, audiogram, and ear exam, I don't find any evidence of medical or surgical disease that would have caused the hearing loss.  Although noise exposure could be a factor, and despite her complex medical history, based on the timeline and audiology studies, I suspect that this is primarily genetic/presbycusis.    We discussed the natural history of the steady loss of human hearing, and things to help.  I certainly recommend considering hearing aids, and they have my medical clearance to look into being fitted, and information has been provided.    Finally, safety considerations such as loud smoke detectors, alarm clocks, and special aids for the hearing impaired using phones and television were also discussed.      Again, thank you for allowing me to participate in the care of your patient.        Sincerely,        Alex Rose MD

## 2020-09-11 NOTE — PROGRESS NOTES
AUDIOLOGY REPORT     SUMMARY: Audiology visit completed. See audiogram for results.     RECOMMENDATIONS: Follow-up with ENT    Orlando Perez Licensed Audiologist #[id number]

## 2020-09-11 NOTE — NURSING NOTE
"Chief Complaint   Patient presents with     Hearing Problem       Initial BP (!) 159/104 (BP Location: Right arm, Patient Position: Chair, Cuff Size: Adult Regular)   Pulse 89   Temp 97.1  F (36.2  C) (Tympanic)   Resp 16  Estimated body mass index is 34.4 kg/m  as calculated from the following:    Height as of 8/4/20: 1.721 m (5' 7.75\").    Weight as of 8/4/20: 101.9 kg (224 lb 9.6 oz).  BP completed using cuff size: regular long   Medications and allergies reviewed.      Anastasia CONTRERAS CMA     "

## 2020-09-11 NOTE — PATIENT INSTRUCTIONS
Per physician instructions.    If you have questions or concerns on any instructions given to you by your provider today or if you need to schedule an appointment, you can reach us at [phone].    Thank you!

## 2020-09-23 ENCOUNTER — PATIENT OUTREACH (OUTPATIENT)
Dept: EDUCATION SERVICES | Facility: CLINIC | Age: 70
End: 2020-09-23
Payer: COMMERCIAL

## 2020-09-23 DIAGNOSIS — E11.29 TYPE 2 DIABETES MELLITUS WITH OTHER DIABETIC KIDNEY COMPLICATION (H): Primary | ICD-10-CM

## 2020-09-23 PROCEDURE — 99207: CPT

## 2020-09-23 NOTE — PROGRESS NOTES
"Diabetes Self-Management Education & Support  Patient verbally consented to the telephone visit service today: yes    Presents for: Follow-up    SUBJECTIVE/OBJECTIVE:  Presents for: Follow-up  Accompanied by: Self  Diabetes education in the past 24mo: No  Focus of Visit: Healthy Eating  Diabetes type: Type 2  Disease course: Getting harder to manage  Other concerns:: None  Cultural Influences/Ethnic Background:  American      Diabetes Symptoms & Complications:  Fatigue: Yes  Neuropathy: Yes  Polydipsia: Yes  Polyuria: Yes  Visual change: No  Weight trend: Increasing       Patient Problem List and Family Medical History reviewed for relevant medical history, current medical status, and diabetes risk factors.    Vitals:  There were no vitals taken for this visit.  Estimated body mass index is 34.4 kg/m  as calculated from the following:    Height as of 8/4/20: 1.721 m (5' 7.75\").    Weight as of 8/4/20: 101.9 kg (224 lb 9.6 oz).   Last 3 BP:   BP Readings from Last 3 Encounters:   09/11/20 (!) 159/104   08/04/20 136/88   01/27/20 124/81       History   Smoking Status     Former Smoker     Types: Cigarettes   Smokeless Tobacco     Never Used     Comment: 1-2 cigarettes occasionally       Labs:  Lab Results   Component Value Date    A1C 12.6 05/20/2020     Lab Results   Component Value Date     05/20/2020     Lab Results   Component Value Date    LDL  01/08/2020     Cannot estimate LDL when triglyceride exceeds 400 mg/dL     01/08/2020     HDL Cholesterol   Date Value Ref Range Status   01/08/2020 38 (L) >49 mg/dL Final   ]  GFR Estimate   Date Value Ref Range Status   05/20/2020 71 >60 mL/min/[1.73_m2] Final     Comment:     Non  GFR Calc  Starting 12/18/2018, serum creatinine based estimated GFR (eGFR) will be   calculated using the Chronic Kidney Disease Epidemiology Collaboration   (CKD-EPI) equation.       GFR Estimate If Black   Date Value Ref Range Status   05/20/2020 82 >60 " "mL/min/[1.73_m2] Final     Comment:      GFR Calc  Starting 12/18/2018, serum creatinine based estimated GFR (eGFR) will be   calculated using the Chronic Kidney Disease Epidemiology Collaboration   (CKD-EPI) equation.       Lab Results   Component Value Date    CR 0.83 05/20/2020     No results found for: MICROALBUMIN    Healthy Eating:  Healthy Eating Assessed Today: Yes  Cultural/Buddhism diet restrictions?: No  Breakfast: Skips  Lunch: [employer] (works at [employer])  Dinner: chips + dip, candy  Beverages: Soda, Other, Water(\"used to drink a lot of pop and lemonade\")    Being Active:  Being Active Assessed Today: No  Exercise:: Currently not exercising    Monitoring:  Monitoring Assessed Today: Yes  Did patient bring glucose meter to appointment? : No  Blood Glucose Meter: One Touch  Times checking blood sugar at home (number): 2  Times checking blood sugar at home (per): Week  Blood glucose trend: Other    Patient left her meter in the car.  Does not test regularly.  Acknowledges that she should be testing 3x/day.  States she has sufficient testing supplies.    Taking Medications:  Diabetes Medication(s)     Insulin       insulin glargine (LANTUS PEN) 100 UNIT/ML pen    Inject 8 Units Subcutaneous At Bedtime          Taking Medication Assessed Today: Yes  Problems taking diabetes medications regularly?: Yes(financial)    Problem Solving:  Problem Solving Assessed Today: No              Reducing Risks:  Reducing Risks Assessed Today: No    Healthy Coping:  Healthy Coping Assessed Today: Yes  Emotional response to diabetes: Fear/Anxiety, Helplessness  Stage of change: CONTEMPLATION (Considering change and yet undecided)  Patient Activation Measure Survey Score:  EBONY Score (Last Two) 3/14/2012   EBONY Raw Score 39   Activation Score 56.4   EBONY Level 3       Diabetes knowledge and skills assessment:   Patient is knowledgeable in diabetes management concepts related to: Monitoring and Taking " Medication    Patient needs further education on the following diabetes management concepts: Healthy Eating, Monitoring, Taking Medication, Problem Solving and Reducing Risks    Based on learning assessment above, most appropriate setting for further diabetes education would be: Individual setting.      INTERVENTIONS:    Education provided today on:  AADE Self-Care Behaviors:  Monitoring: purpose, individual blood glucose targets and frequency of monitoring  Taking Medication: action of prescribed medication and side effects of prescribed medications  Problem Solving: high blood glucose - causes, signs/symptoms, treatment and prevention    Opportunities for ongoing education and support in diabetes-self management were discussed.    Pt verbalized understanding of concepts discussed and recommendations provided today.       Education Materials Provided:  No new materials provided today      ASSESSMENT:  Called patient for diabetes education follow-up.  Patient states she has not worked with a diabetes educator recently.  Patient notes her primary concern today is cost of medications.  She states she can't afford Victoza, but liked Victoza when she was on it because she felt much better.  Patient communicated with her doctor that she could not afford Victoza, so Lantus insulin was precribed in place.  Patient states the insulin was only $40 cheaper which is not a big difference from the Victoza.  Patient also stated numerous times that she does not want to be on insulin at all.  Patient was under the impression that the appointment with me today would allow her to get the Victoza at a lower price.  I explained that  has a prescription  that can provide assistance in some cases.  I provided the name and phone number for Marlee Simpson and encouraged the patient to call her today.    We also discussed the importance of BG testing today.  I provided testing times and goals.  I encouraged patient to  keep her meter with her and not in her vehicle.    Patient stated numerous times that she feels frustrated, anxious and hopeless about her diabetes.        Patient's most recent   Lab Results   Component Value Date    A1C 12.6 05/20/2020    is not meeting goal of <7.0    PLAN  See Patient Instructions for co-developed, patient-stated behavior change goals.    MACI Morgan CDE    Time Spent: 40 minutes  Encounter Type: Individual    Any diabetes medication dose changes were made via the CDE Protocol and Collaborative Practice Agreement with the patient's referring provider. A copy of this encounter was shared with the provider.

## 2020-09-23 NOTE — PATIENT INSTRUCTIONS
1. Call Marlee Simpson with [facility].  2. I will reach out to referring doctor to communicate financial difficulties with prescribed medications.  3. Follow-up with patient in 3-4 weeks.

## 2020-10-01 ENCOUNTER — TELEPHONE (OUTPATIENT)
Dept: FAMILY MEDICINE | Facility: CLINIC | Age: 70
End: 2020-10-01

## 2020-10-01 NOTE — TELEPHONE ENCOUNTER
FYI~ ct 1, 2020 I spoke with Cyndy, she is in need of financial assistance for medication.    We reviewed the Pharmacy Assistance Fund $500, the Prescription Assistance Program for manfacturer brand name assistance programs, gross income, insurance and Rx list. Cyndy is not in need of the Pharmacy Assistance Fund at this time.    Cyndy did state she would prefer to go back to [facility].  However she just bought the Lantus.    The [employer] assistance application for Lantus Solostar will be completed.  When approved will Cyndy receive this medication at no cost for the remainder of 2020.    Marlee Simpson  Prescription   Pharmacy Assistance  [phone]

## 2020-10-08 ENCOUNTER — VIRTUAL VISIT (OUTPATIENT)
Dept: FAMILY MEDICINE | Facility: CLINIC | Age: 70
End: 2020-10-08
Payer: COMMERCIAL

## 2020-10-08 DIAGNOSIS — Z20.822 SUSPECTED COVID-19 VIRUS INFECTION: Primary | ICD-10-CM

## 2020-10-08 PROCEDURE — 99213 OFFICE O/P EST LOW 20 MIN: CPT | Mod: 95 | Performed by: NURSE PRACTITIONER

## 2020-10-08 ASSESSMENT — ENCOUNTER SYMPTOMS
CHILLS: 0
COUGH: 1
VOMITING: 0
SHORTNESS OF BREATH: 0
ABDOMINAL PAIN: 1
HEADACHES: 0
DIARRHEA: 1
CARDIOVASCULAR NEGATIVE: 1
NAUSEA: 0
FATIGUE: 1
SORE THROAT: 0
WHEEZING: 0
SINUS PRESSURE: 0
SINUS PAIN: 0
RHINORRHEA: 0
FEVER: 0
MYALGIAS: 0

## 2020-10-08 NOTE — PROGRESS NOTES
"Cyndy Wang is a 70 year old female who is being evaluated via a billable telephone visit.      The patient has been notified of following:     \"This telephone visit will be conducted via a call between you and your physician/provider. We have found that certain health care needs can be provided without the need for a physical exam.  This service lets us provide the care you need with a short phone conversation.  If a prescription is necessary we can send it directly to your pharmacy.  If lab work is needed we can place an order for that and you can then stop by our lab to have the test done at a later time.    Telephone visits are billed at different rates depending on your insurance coverage. During this emergency period, for some insurers they may be billed the same as an in-person visit.  Please reach out to your insurance provider with any questions.    If during the course of the call the physician/provider feels a telephone visit is not appropriate, you will not be charged for this service.\"    Patient has given verbal consent for Telephone visit?  Yes    What phone number would you like to be contacted at? [phone]    How would you like to obtain your AVS? Mail a copy    Subjective     Cyndy Wang is a 70 year old female who presents via phone visit today for the following health issues:    HPI     Concern - Note for Work   Onset: 3 days ago (Tuesday)   Description: patient states that she called in to work due to an upset stomach and some diarrhea. New policy is that when they call in they need a note to return   Intensity: moderate  Progression of Symptoms:  improving  Accompanying Signs & Symptoms: None   Previous history of similar problem: no   Precipitating factors:        Worsened by: nothing   Alleviating factors:        Improved by: nothing   Therapies tried and outcome:  none        Additional provider notes: Stayed home Tuesday and Wednesday from work with upset stomach and diarrhea mostly " "occurring on Tuesday. 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 Work requiring note stating she is cleared to return to work.       Review of Systems   Constitutional: Positive for fatigue (chronic, no worse than normal). Negative for chills and fever.   HENT: Negative for congestion, ear pain, rhinorrhea, sinus pressure, sinus pain and sore throat.    Respiratory: Positive for cough (chronic smoker). Negative for shortness of breath and wheezing.    Cardiovascular: Negative.    Gastrointestinal: Positive for abdominal pain (\"upset stomach\") and diarrhea. Negative for nausea and vomiting.   Musculoskeletal: Negative for myalgias.   Neurological: Negative for headaches.             Objective          Vitals:  No vitals were obtained today due to virtual visit.    healthy, alert, no distress and cooperative  PSYCH: Alert and oriented times 3; coherent speech, normal   rate and volume, able to articulate logical thoughts, able   to abstract reason, no tangential thoughts, no hallucinations   or delusions  Her affect is normal and pleasant  RESP: No cough, no audible wheezing, able to talk in full sentences  Remainder of exam unable to be completed due to telephone visits            Assessment/Plan:    Assessment & Plan     Suspected COVID-19 virus infection  She has one lesser symptoms of COVID. Given she stayed home from work for 2 days due to symptoms, [facility] guidelines recommend COVID testing (not required). In order to write a note to clear her for work, she was advised to have COVID testing done. Patient requesting note for work stating she was to have the testing done and would need to quarantine. Then she would like another note once she receives results. No other symptoms suggesting COVID per patient. She does have a lot of chronic concerns that would meet COVID symptoms if they were new symptoms, but they have not been. COVID testing ordered. COVID testing scheduled for 2pm at [facility] tomorrow.    - Symptomatic COVID-19 Virus " (Coronavirus) by PCR; Future        Patient Instructions   COVID testing ordered today. You will receive a call to schedule that appointment.     Please quarantine until you receive your results.     If the results are negative, you need to be symptom free for 24 hours before ending quarantine.     If results are positive, you need to quarantine for 10 days from start of symptoms AND you need to be symptom free for 72 hours before ending quarantine.     Asymptomatic household contacts will need to quarantine for 14 days if your results come back positive.         Return if symptoms worsen or fail to improve.    TY Tavarez [facility]    Phone call duration:  8 minutes

## 2020-10-08 NOTE — LETTER
October 8, 2020      Cyndy Wang  [address]  [place] [address]        To Whom It May Concern:    Cyndy Wang was seen on 10/08/2020.  She is being scheduled for COVID testing and needs to quarantine until she gets those results back.         Sincerely,        TY Tavarez CNP

## 2020-10-09 ENCOUNTER — ALLIED HEALTH/NURSE VISIT (OUTPATIENT)
Dept: FAMILY MEDICINE | Facility: CLINIC | Age: 70
End: 2020-10-09
Payer: COMMERCIAL

## 2020-10-09 DIAGNOSIS — Z20.822 SUSPECTED COVID-19 VIRUS INFECTION: ICD-10-CM

## 2020-10-09 PROCEDURE — 99207 PR NO CHARGE NURSE ONLY: CPT

## 2020-10-09 PROCEDURE — U0003 INFECTIOUS AGENT DETECTION BY NUCLEIC ACID (DNA OR RNA); SEVERE ACUTE RESPIRATORY SYNDROME CORONAVIRUS 2 (SARS-COV-2) (CORONAVIRUS DISEASE [COVID-19]), AMPLIFIED PROBE TECHNIQUE, MAKING USE OF HIGH THROUGHPUT TECHNOLOGIES AS DESCRIBED BY CMS-2020-01-R: HCPCS | Performed by: NURSE PRACTITIONER

## 2020-10-09 NOTE — PATIENT INSTRUCTIONS
COVID testing ordered today. You will receive a call to schedule that appointment.     Please quarantine until you receive your results.     If the results are negative, you need to be symptom free for 24 hours before ending quarantine.     If results are positive, you need to quarantine for 10 days from start of symptoms AND you need to be symptom free for 72 hours before ending quarantine.     Asymptomatic household contacts will need to quarantine for 14 days if your results come back positive.

## 2020-10-10 LAB
SARS-COV-2 RNA SPEC QL NAA+PROBE: NOT DETECTED
SPECIMEN SOURCE: NORMAL

## 2020-10-13 ENCOUNTER — VIRTUAL VISIT (OUTPATIENT)
Dept: EDUCATION SERVICES | Facility: CLINIC | Age: 70
End: 2020-10-13
Payer: COMMERCIAL

## 2020-10-13 DIAGNOSIS — Z53.9 NO SHOW: Primary | ICD-10-CM

## 2020-10-13 NOTE — Clinical Note
Patient no showed me this morning - she does likely need medication adjustments, however. I hope she will reschedule. Not sure if she is still only taking 8 units Lantus, but really needs a titration plan if she is to continue on this medication. Thanks!   Didi Bradley RD LD CDE

## 2020-10-13 NOTE — PROGRESS NOTES
Attempted to call patient x2, no answer. Left message with rescheduling line.    Didi Bradley RD LD CDE    This patient was a no show for this scheduled appointment.

## 2020-10-19 ENCOUNTER — TELEPHONE (OUTPATIENT)
Dept: FAMILY MEDICINE | Facility: CLINIC | Age: 70
End: 2020-10-19

## 2020-10-19 NOTE — TELEPHONE ENCOUNTER
Routed to provider pool.    Maty is at [facility] today.  Can someone print and re-sign the letter please?     Pt wants to pick it up today at [place].    Khadra Dennison RN

## 2020-10-19 NOTE — TELEPHONE ENCOUNTER
Patient is asking for the letter to be signed looks like she had one done 10/12/2020 she has not received it in the mail.   Can she get it signed and she would like to .    Tequila Mallory on 10/19/2020 at 12:12 PM

## 2020-10-20 NOTE — TELEPHONE ENCOUNTER
Tried to call house 2 times it would answer then I would hear just the tv in the background. Called left voice mail on mobile phone to pick letter up to the .    Tequila Mallory on 10/20/2020 at 9:05 AM

## 2020-11-25 ENCOUNTER — TELEPHONE (OUTPATIENT)
Dept: FAMILY MEDICINE | Facility: CLINIC | Age: 70
End: 2020-11-25

## 2020-11-25 NOTE — TELEPHONE ENCOUNTER
Cyndy was sent the [employer] assistance application for review & signature October 6, 2020 and requested documents to submit with the application.    Cyndy has been reminded several times and we have had no response from her.    I have closed her case and archived her file.  She will be sent a reminder letter in January 2021 if she would like to re-apply.    Marlee Simpson  Prescription   Pharmacy Assistance  [phone]

## 2020-12-07 ENCOUNTER — HOSPITAL ENCOUNTER (EMERGENCY)
Facility: CLINIC | Age: 70
Discharge: HOME | End: 2020-12-07
Attending: NURSE PRACTITIONER | Admitting: NURSE PRACTITIONER
Payer: COMMERCIAL

## 2020-12-07 VITALS
RESPIRATION RATE: 16 BRPM | BODY MASS INDEX: 34.1 KG/M2 | SYSTOLIC BLOOD PRESSURE: 154 MMHG | DIASTOLIC BLOOD PRESSURE: 98 MMHG | TEMPERATURE: 99 F | HEIGHT: 68 IN | OXYGEN SATURATION: 96 % | WEIGHT: 225 LBS | HEART RATE: 90 BPM

## 2020-12-07 DIAGNOSIS — S90.821A BLISTER OF RIGHT HEEL WITH INFECTION, INITIAL ENCOUNTER: ICD-10-CM

## 2020-12-07 DIAGNOSIS — L08.9 BLISTER OF RIGHT HEEL WITH INFECTION, INITIAL ENCOUNTER: ICD-10-CM

## 2020-12-07 PROCEDURE — G0463 HOSPITAL OUTPT CLINIC VISIT: HCPCS | Performed by: NURSE PRACTITIONER

## 2020-12-07 PROCEDURE — 99214 OFFICE O/P EST MOD 30 MIN: CPT | Performed by: NURSE PRACTITIONER

## 2020-12-07 RX ORDER — CEFDINIR 300 MG/1
300 CAPSULE ORAL 2 TIMES DAILY
Qty: 20 CAPSULE | Refills: 0 | Status: SHIPPED | OUTPATIENT
Start: 2020-12-07 | End: 2020-12-17

## 2020-12-07 ASSESSMENT — MIFFLIN-ST. JEOR: SCORE: 1584.09

## 2020-12-07 NOTE — ED AVS SNAPSHOT
[facility]  [address] [address]  Phone: [phone]  Fax: [phone]                                    Cyndy Wang   MRN: [id number]    Department: [facility]   Date of Visit: 12/7/2020           After Visit Summary Signature Page    I have received my discharge instructions, and my questions have been answered. I have discussed any challenges I see with this plan with the nurse or doctor.    ..........................................................................................................................................  Patient/Patient Representative Signature      ..........................................................................................................................................  Patient Representative Print Name and Relationship to Patient    ..................................................               ................................................  Date                                   Time    ..........................................................................................................................................  Reviewed by Signature/Title    ...................................................              ..............................................  Date                                               Time          22EPIC Rev 08/18

## 2020-12-08 NOTE — DISCHARGE INSTRUCTIONS
Start the cefdinir tonight and take 1 capsule by mouth twice daily for 10 days.  I recommend soaking the foot in chlorhexidine or Hibiclens twice daily for 10 minutes alternatively you could do 1 teaspoon of bleach in a pan full of water twice daily.  I recommend moisturizing the foot well with a cream like CeraVe.  Follow-up with podiatry.  Wear the postop shoe at all times when ambulatory.  Avoid puncturing the blister.

## 2020-12-08 NOTE — ED PROVIDER NOTES
History     Chief Complaint   Patient presents with     Foot Injury     right heel pain  last td 3/14/12     HPI  Cyndy Wang is a 70 year old female with past medical history of hypothyroidism, depression, hypertension, type 2 diabetes who presents to urgent care with a right heel injury.  Patient states she injured the heel approximately 1 week ago.  Patient states she stepped on something and uncertain of what she stepped on but has had heel pain since.  Patient reports pain in the heel with weightbearing activity.  Patient denies any pustular drainage or weeping or bleeding.  Patient denies any previous similar problems  Patient denies fever, aches, chills, sweats, ear pain, eye pain, throat pain, chest pain, cough, wheezing, shortness of breath, abdominal pain, nausea, vomiting, diarrhea, dysuria, speech difficulty.  Patient reports feeling well otherwise      Allergies:  Allergies   Allergen Reactions     Sulfa Drugs Difficulty breathing     Lisinopril Cough     Penicillins Difficulty breathing       Problem List:    Patient Active Problem List    Diagnosis Date Noted     Hypertension, goal below 140/90 12/01/2016     Priority: Medium     Hypothyroidism, unspecified type 12/01/2016     Priority: Medium     Major depressive disorder, recurrent episode, moderate (H) 12/01/2016     Priority: Medium     Obesity due to excess calories, unspecified obesity severity 01/07/2016     Priority: Medium     Binge eating disorder 01/07/2016     Priority: Medium     Type 2 diabetes mellitus with other diabetic kidney complication (H) 10/24/2015     Priority: Medium     Generalized anxiety disorder 09/11/2013     Priority: Medium     Diagnosis updated by automated process. Provider to review and confirm.       Advanced directives, counseling/discussion 03/06/2013     Priority: Medium     Advance Care Planning:   ACP Review and Resources Provided:  Reviewed chart for advance care plan.  Cyndy Wang has no plan or code  status on file however states presence of ACP document. Copy requested. Confirmed code status reflects current choices pending receipt of document/advance care plan review. Confirmed/documented designated decision maker(s). See permanent comments section of demographics in clinical tab.   Added by Hoa Gunter on 3/6/2013             Health Care Home 01/07/2013     Priority: Medium     3/19/13 - Status: Pt is not currently in active care coordination.     [facility]  Patient Care Plan  About Me    Cyndy Wang    Date of birth 1950 Cell phone    Age 62 Home phone [phone]   MRN [id number] Work phone    Insurance  Address    Insurance ID #  Email address    Parent/Guardian  Phone    Contact Name  Phone      Advance Care Directives on file: NO  Primary language: English                  needed? No   Current living arrangement: Lives alone in an apartment  Mobility Status/ Medical Equipment: None  Other information to know about me: To add    My Access Plan    Medical Emergency 911     Primary Clinic Line [facility]  [phone]   24 Hour Appointment Line [phone] or  [phone]) (toll-free)   24 Hour Nurse Line [phone] (toll-free)   Preferred Urgent Care    Preferred Hospital    Preferred Pharmacy    Behavioral Health Crisis Line [facility], [phone]   or 142       My Care Team Members    Care Team Member  Name/Specialty Clinic, Address, Phone, Fax, E-mail Date of release on file   Primary Care Provider: Dr Jaqui Grimes MD [facility]  [phone]    Care Coordinator: JANNA Morales, [facility]  [phone]             My Care Plans    Self Management and Treatment Plan    Presenting Concern Signs and symptoms Goal/Action Plan   Need for low cost dental care  Cyndy to work with [facility] to get Medical Assistance coverage, schedule  dental work with [facility] or another low cost dentist.   Start Date: 11/14/2012 Active Initiated with: Yaritza care coordinator Date Reviewed: 1/7/2013           Start Date: Active/Complete Initiated with: Date Reviewed:        Action Plans on File: None    Advance Care Plans/Directives:  not on file      My Medical and Care Information    Problem List    Dyslipidemia Essential hypertension Hyperlipidemia LDL goal <100 Hypothyroidism Moderate major depression MS (multiple sclerosis) Obesity Type 2 diabetes, HbA1C goal < 8% Vitamin D deficiency disease    Current Medications as of 1/7/2012  Current Outpatient Prescriptions   Medication     glucose blood VI test strips strip     ORDER FOR DME     ORDER FOR DME     ORDER FOR DME     liraglutide (VICTOZA) 18 MG/3ML SOLN     metFORMIN (GLUCOPHAGE-XR) 500 MG 24 hr tablet     atorvastatin (LIPITOR) 20 MG tablet     losartan (COZAAR) 50 MG tablet     venlafaxine (EFFEXOR-ER) 75 MG TB24     levothyroxine (SYNTHROID, LEVOTHROID) 175 MCG tablet     aspirin 325 MG EC tablet     Insulin Pen Needle needle       Allergies   Allergies   Allergen Reactions     Sulfa Drugs Difficulty breathing     Lisinopril cough     Penicillins Difficulty breathing        Health Care Home Complexity Tier:           Care Coordination Start Date: 11/2/2012   Frequency of Care Coordination: Monthly   Form Last Updated: 1/7/2013       DX V65.8 REPLACED WITH 43967 HEALTH CARE HOME (04/08/2013)         Vitamin D deficiency disease 05/31/2012     Priority: Medium     Hyperlipidemia LDL goal <100 03/16/2012     Priority: Medium     MS (multiple sclerosis) (H) 11/29/2011     Priority: Medium        Past Medical History:    Past Medical History:   Diagnosis Date     Diabetes (H)      Hypertension      Obese      Thyroid disease      Type 2 diabetes mellitus with other diabetic kidney complication (H) 10/24/2015       Past Surgical History:    Past Surgical History:   Procedure Laterality Date     REPAIR  "TENDON FINGER(S) Right 5/22/2015    Procedure: REPAIR TENDON FINGER(S);  Surgeon: Xavier Paulson MD;  Location: [facility] OR       Family History:    Family History   Problem Relation Age of Onset     Neurologic Disorder Mother         Stroke     Heart Disease Maternal Grandfather      [email or web address]. Maternal Grandmother      Breast Cancer No family hx of      Cancer - colorectal No family hx of      Melanoma No family hx of        Social History:  Marital Status:   [5]  Social History     Tobacco Use     Smoking status: Former Smoker     Types: Cigarettes     Smokeless tobacco: Never Used     Tobacco comment: 1-2 cigarettes occasionally   Substance Use Topics     Alcohol use: No     Alcohol/week: 0.0 standard drinks     Drug use: No        Medications:         cefdinir (OMNICEF) 300 MG capsule       aspirin (ASA) 81 MG chewable tablet       blood glucose (NO BRAND SPECIFIED) lancets standard       blood glucose (NO BRAND SPECIFIED) test strip       blood glucose monitoring (NO BRAND SPECIFIED) meter device kit       insulin glargine (LANTUS PEN) 100 UNIT/ML pen       insulin pen needle (31G X 5 MM) 31G X 5 MM miscellaneous       insulin pen needle (B-D U/F) 31G X 8 MM miscellaneous       levothyroxine (SYNTHROID/LEVOTHROID) 175 MCG tablet       losartan (COZAAR) 50 MG tablet       pravastatin (PRAVACHOL) 20 MG tablet       topiramate (TOPAMAX) 25 MG tablet       traMADol (ULTRAM) 50 MG tablet       triamcinolone (KENALOG) 0.1 % cream       venlafaxine (EFFEXOR-XR) 75 MG 24 hr capsule          Review of Systems  As mentioned above in the history present illness. All other systems were reviewed and are negative.    Physical Exam   BP: (!) 154/98  Pulse: 90  Temp: 99  F (37.2  C)  Resp: 16  Height: 172.7 cm (5' 8\")  Weight: 102.1 kg (225 lb)  SpO2: 96 %      Physical Exam  Vitals signs and nursing note reviewed.   Constitutional:       General: She is not in acute distress.     Appearance: She is well-developed. She is " not diaphoretic.   HENT:      Head: Normocephalic and atraumatic.      Right Ear: External ear normal.      Left Ear: External ear normal.   Eyes:      General:         Right eye: No discharge.         Left eye: No discharge.      Conjunctiva/sclera: Conjunctivae normal.   Cardiovascular:      Rate and Rhythm: Regular rhythm.      Heart sounds: Normal heart sounds. No murmur. No friction rub.   Pulmonary:      Effort: Pulmonary effort is normal. No respiratory distress.      Breath sounds: Normal breath sounds. No stridor. No wheezing or rales.   Chest:      Chest wall: No tenderness.   Musculoskeletal:        Feet:    Skin:     General: Skin is warm and dry.      Coloration: Skin is not pale.      Findings: No erythema or rash.   Neurological:      Mental Status: She is alert.             ED Course        Procedures    No results found for this or any previous visit (from the past 24 hour(s)).    Medications - No data to display    Assessments & Plan (with Medical Decision Making)  Cyndy Wang is a 70 year old female with past medical history of hypothyroidism, depression, hypertension, type 2 diabetes who presents to urgent care with a right heel injury.  Patient states she injured the heel approximately 1 week ago.  Patient states she stepped on something and uncertain of what she stepped on but has had heel pain since.  Patient reports pain in the heel with weightbearing activity.  Patient denies any pustular drainage or weeping or bleeding.  On exam patient has a 2 cm blister with questionable pustule center appearance of a previous blood blister and possible surrounding erythema.  Photo taken.  Encourage patient to start foot soaks twice a day started on cefdinir as patient works and has more difficulty taking medication 4 times a day and orthopedic referral placed for follow-up.  Patient verbalized understanding.  Patient discharged in stable condition.  At this point does not appear to be septic and  patient has no chills nor tachycardia to indicate as such.     I have reviewed the nursing notes.    I have reviewed the findings, diagnosis, plan and need for follow up with the patient.    Discharge Medication List as of 12/7/2020  7:48 PM      START taking these medications    Details   cefdinir (OMNICEF) 300 MG capsule Take 1 capsule (300 mg) by mouth 2 times daily for 10 days, Disp-20 capsule, R-0, E-Prescribe             Final diagnoses:   Blister of right heel with infection, initial encounter       12/7/2020   [facility] EMERGENCY DEPT     Yohannes, Florinda Cummings, APRN CNP  12/07/20 1954

## 2020-12-09 ENCOUNTER — OFFICE VISIT (OUTPATIENT)
Dept: PODIATRY | Facility: CLINIC | Age: 70
End: 2020-12-09
Payer: COMMERCIAL

## 2020-12-09 VITALS
SYSTOLIC BLOOD PRESSURE: 173 MMHG | HEART RATE: 87 BPM | HEIGHT: 68 IN | WEIGHT: 225 LBS | BODY MASS INDEX: 34.1 KG/M2 | DIASTOLIC BLOOD PRESSURE: 101 MMHG

## 2020-12-09 DIAGNOSIS — S90.821A BLISTER OF RIGHT HEEL WITH INFECTION, INITIAL ENCOUNTER: ICD-10-CM

## 2020-12-09 DIAGNOSIS — E11.29 TYPE 2 DIABETES MELLITUS WITH OTHER DIABETIC KIDNEY COMPLICATION (H): Primary | ICD-10-CM

## 2020-12-09 DIAGNOSIS — L97.519 TYPE 2 DIABETES MELLITUS WITH RIGHT DIABETIC FOOT ULCER (H): ICD-10-CM

## 2020-12-09 DIAGNOSIS — E11.621 TYPE 2 DIABETES MELLITUS WITH RIGHT DIABETIC FOOT ULCER (H): ICD-10-CM

## 2020-12-09 DIAGNOSIS — L08.9 BLISTER OF RIGHT HEEL WITH INFECTION, INITIAL ENCOUNTER: ICD-10-CM

## 2020-12-09 LAB
GRAM STN SPEC: ABNORMAL
Lab: ABNORMAL
SPECIMEN SOURCE: ABNORMAL

## 2020-12-09 PROCEDURE — 99203 OFFICE O/P NEW LOW 30 MIN: CPT | Mod: 25 | Performed by: PODIATRIST

## 2020-12-09 PROCEDURE — 11042 DBRDMT SUBQ TIS 1ST 20SQCM/<: CPT | Performed by: PODIATRIST

## 2020-12-09 RX ORDER — AMMONIUM LACTATE 12 G/100G
CREAM TOPICAL 2 TIMES DAILY PRN
Qty: 385 G | Refills: 3 | Status: SHIPPED | OUTPATIENT
Start: 2020-12-09

## 2020-12-09 ASSESSMENT — MIFFLIN-ST. JEOR: SCORE: 1584.09

## 2020-12-09 NOTE — PATIENT INSTRUCTIONS
Neck steps:    1.  Soak the foot daily in warm Epson salt water.  2.  Apply a topical antibiotic ointment and a Band-Aid to the wound daily.  3.  Continue taking your antibiotics.  4.  Stay off the foot as much as possible.    Cyndy to follow up with Primary Care provider regarding elevated blood pressure.    FOOT ULCER (WOUND) EDUCATION  Ulceration ofthe foot involves a break or hole in the skin. Skin is our best protection against infection. Skin is quite durable, however, the underlying tissues are fragile. For this reason, the wound is likely to deepen rapidly. Deep wounds usually get infected and require amputation. Prompt healing is therefore essential to avoid limb loss.     Foot ulcers do not heal without intervention. Walking on the foot and living your normal life is not typically compatible with healing the sore. Successful healing will require several months of significant alteration of your daily activities.   Ulcer complications frequently develop. This primarily includes infection of skin, which then spreads deep into your joints, bones and tendons. Spreading infection may travel up your leg and into other parts of your body. Deep infection is usually treated with amputation ofpart ofyour foot or your leg. Signs of infection include fever, chills, nausea, vomiting, erratic blood sugars, local redness, pus, strong odor and localized warmth. Signs of infection should be taken seriously. Prompt evaluation in the clinic or hospital emergency room is required.   Ulcer treatment requires debridement or surgical removal of devitalized tissue. Your doctor will trim away callused, moistened, unhealthy tissue from the wound surface and margin. This helps to clean the wound and allows proper inspection. Debridement also stimulates healing even though the wound originally appears larger. Expect some bleeding with each debridement. You will be given instruction regarding wound bandaging. This often includes  ointment and gauze. Avoid tape directly on the skin. Hand washing is essential since most infections will come from your fingertips. Ulcer care requires a no touch technique. Your fingers should not touch the margin or base of the wound.    HELPFUL HEALING TIPS:  1. Debridement: Getting rid of bad tissue makes way for good tissue to promote healing  2. Addressing Foot Deformities: Hammertoes and bunions can cause increased pressure  3. Pressure Reduction: If pressure remains to the wound, it won't heal  4. Good Pulses: If bloodflow is not getting to the foot, the ulcer will not heal  5. Good Nutrition: If you are not getting proper nutrition your body can't heal.Protein! At least 90g a day.  Supplements are a good way to help get this, such as Pasha, Glucerna, Ensure. Also taking 5000units of Vitamin D a day.   6. Infection Control: Keep the ulcer clean with wound cleanser. DO NOT SOAK IT!  7. Moisture Control: Keep edema down and make sure that drainage is getting pulled away from the ulcer    IMPORTANCE OF DEBRIDEMENT    Reduces bioburden to help control or reduce infection. Even if an ulcer is not  infected,  the bacterial bioburden causes increased local inflammation.    Allows more accurate visualization of the wound base and edges, which allows for more precise staging.    Removes necrotic/non-viable tissue, which impedes wound healing, causes protein loss and can be a nidus for infection.    Stimulates new circulation (angiogenesis) and allows adequate oxygen delivery to the wound.    Removes undermining and tunneling, and may help reduce abscess formation.    Releases healing growth factors at the edge of the wound.    Prepares the wound bed by leaving only tissues that are capable of regenerating.

## 2020-12-09 NOTE — NURSING NOTE
"Chief Complaint   Patient presents with     Consult     \"Blister that formed on the right heel-X 1 week\"       Initial BP (!) 173/101   Pulse 87   Ht 1.727 m (5' 8\")   Wt 102.1 kg (225 lb)   BMI 34.21 kg/m   Estimated body mass index is 34.21 kg/m  as calculated from the following:    Height as of this encounter: 1.727 m (5' 8\").    Weight as of this encounter: 102.1 kg (225 lb).  Medications and allergies reviewed.      Lisa CAR MA    "

## 2020-12-09 NOTE — PROGRESS NOTES
PATIENT HISTORY:  Cyndy Wang is a 70 year old female with type 2 diabetes who presents to clinic with a chief complaint of a blister on the bottom of the right heel.  The patient relates that the problem has been going on for several days and is getting worse.  The patient was seen in urgent care and placed on oral antibiotics, Omnicef.  Previous notes and studies pertinent to the patient's condition were reviewed.      REVIEW OF SYSTEMS:  Constitutional, HEENT, cardiovascular, pulmonary, GI, , musculoskeletal, neuro, skin, endocrine and psych systems are negative, except as otherwise noted.     PAST MEDICAL HISTORY:   Past Medical History:   Diagnosis Date     Diabetes (H)      Hypertension      Obese      Thyroid disease      Type 2 diabetes mellitus with other diabetic kidney complication (H) 10/24/2015        PAST SURGICAL HISTORY:   Past Surgical History:   Procedure Laterality Date     REPAIR TENDON FINGER(S) Right 5/22/2015    Procedure: REPAIR TENDON FINGER(S);  Surgeon: Xavier Paulson MD;  Location: [facility] OR        MEDICATIONS:   Current Outpatient Medications:      aspirin (ASA) 81 MG chewable tablet, Take 1 tablet (81 mg) by mouth daily, Disp: , Rfl:      blood glucose (NO BRAND SPECIFIED) lancets standard, 1 each by In Vitro route 3 times daily Use to test blood sugar daily whatever brand her insurance will pay for, Disp: 100 each, Rfl: 1     blood glucose (NO BRAND SPECIFIED) test strip, 1 strip by In Vitro route 3 times daily Use to test blood sugars daily. What ever her insurance will pay for, Disp: 100 strip, Rfl: 1     blood glucose monitoring (NO BRAND SPECIFIED) meter device kit, by In Vitro route 3 times daily Use to test blood sugar daily. What ever her insurance will pay for, Disp: 1 kit, Rfl: 0     cefdinir (OMNICEF) 300 MG capsule, Take 1 capsule (300 mg) by mouth 2 times daily for 10 days, Disp: 20 capsule, Rfl: 0     insulin glargine (LANTUS PEN) 100 UNIT/ML pen, Inject 8 Units  Subcutaneous At Bedtime, Disp: 3 mL, Rfl: 3     insulin pen needle (31G X 5 MM) 31G X 5 MM miscellaneous, Use 1 pen needles daily or as directed., Disp: 30 each, Rfl: 3     insulin pen needle (B-D U/F) 31G X 8 MM miscellaneous, Use once daily or as directed., Disp: 30 each, Rfl: prn     levothyroxine (SYNTHROID/LEVOTHROID) 175 MCG tablet, Take 1 tablet (175 mcg) by mouth daily, Disp: 90 tablet, Rfl: 1     losartan (COZAAR) 50 MG tablet, TAKE ONE TABLET BY MOUTH ONCE DAILY, Disp: 90 tablet, Rfl: 3     pravastatin (PRAVACHOL) 20 MG tablet, Take 1 tablet (20 mg) by mouth daily, Disp: 90 tablet, Rfl: 3     topiramate (TOPAMAX) 25 MG tablet, Take 3 tablets (75 mg) by mouth At Bedtime, Disp: 90 tablet, Rfl: 6     venlafaxine (EFFEXOR-XR) 75 MG 24 hr capsule, TAKE THREE CAPSULES BY MOUTH EVERY DAY, Disp: 270 capsule, Rfl: 3     traMADol (ULTRAM) 50 MG tablet, 1 tab po q 4-6 hours PRN pain (Patient not taking: Reported on 10/8/2020), Disp: 10 tablet, Rfl: 0     triamcinolone (KENALOG) 0.1 % cream, Apply sparingly to affected area three times daily for 14 days. (Patient not taking: Reported on 10/8/2020), Disp: 15 g, Rfl: 0     ALLERGIES:    Allergies   Allergen Reactions     Sulfa Drugs Difficulty breathing     Lisinopril Cough     Penicillins Difficulty breathing        SOCIAL HISTORY:   Social History     Socioeconomic History     Marital status:      Spouse name: Not on file     Number of children: Not on file     Years of education: Not on file     Highest education level: Not on file   Occupational History     Not on file   Social Needs     Financial resource strain: Not on file     Food insecurity     Worry: Not on file     Inability: Not on file     Transportation needs     Medical: Not on file     Non-medical: Not on file   Tobacco Use     Smoking status: Former Smoker     Types: Cigarettes     Smokeless tobacco: Never Used     Tobacco comment: 1-2 cigarettes occasionally   Substance and Sexual Activity      "Alcohol use: No     Alcohol/week: 0.0 standard drinks     Drug use: No     Sexual activity: Not Currently   Lifestyle     Physical activity     Days per week: Not on file     Minutes per session: Not on file     Stress: Not on file   Relationships     Social connections     Talks on phone: Not on file     Gets together: Not on file     Attends Mu-ism service: Not on file     Active member of club or organization: Not on file     Attends meetings of clubs or organizations: Not on file     Relationship status: Not on file     Intimate partner violence     Fear of current or ex partner: Not on file     Emotionally abused: Not on file     Physically abused: Not on file     Forced sexual activity: Not on file   Other Topics Concern     Parent/sibling w/ CABG, MI or angioplasty before 65F 55M? No   Social History Narrative     Not on file        FAMILY HISTORY:   Family History   Problem Relation Age of Onset     Neurologic Disorder Mother         Stroke     Heart Disease Maternal Grandfather      C.A.D. Maternal Grandmother      Breast Cancer No family hx of      Cancer - colorectal No family hx of      Melanoma No family hx of         EXAM:Vitals: BP (!) 173/101   Pulse 87   Ht 1.727 m (5' 8\")   Wt 102.1 kg (225 lb)   BMI 34.21 kg/m              General appearance: Patient is alert and fully cooperative with history & exam.  No sign of distress is noted during the visit.     Psychiatric: Affect is pleasant & appropriate.  Patient appears motivated to improve health.     Respiratory: Breathing is regular & unlabored while sitting.     HEENT: Hearing is intact to spoken word.  Speech is clear.  No gross evidence of visual impairment that would impact ambulation.     Dermatologic: Noted full-thickness blister or bulla on the plantar aspect of the right heel.  Surrounding erythema noted.  The blister is full of fluid.    Vascular: DP & PT pulses are intact & regular on the right.   CFT and skin temperature is normal " to the right lower extremities.     Neurologic: Lower extremity sensation is intact to light touch.  No evidence of weakness in the right lower extremities.        Musculoskeletal: Patient is ambulatory without assistive device or brace.  No gross ankle deformity noted.  No foot or ankle joint effusion is noted.            ASSESSMENT / PLAN:     ICD-10-CM    1. Type 2 diabetes mellitus with other diabetic kidney complication (H)  E11.29    2. Blister of right heel with infection, initial encounter  S90.821A Orthopedic & Spine  Referral    L08.9        I have explained to Cyndy  about the conditions.  We discussed the underlying contributing factors of the condition as well as the treatment plan and expected length of recovery.  At this time, the blister was deroofed and the underlying full-thickness wound was sharply debrided down to healthy subcutaneous tissue with a #15 blade.  Purulent drainage noted.  The drainage was cultured and sent to microbiology for further testing.  The wound was dressed with a sterile bacitracin and bandage application.  The patient was referred to wound care for further evaluation of the wound and treatment options to promote healing.  The patient was fitted with a silicone heel cup to better offload the heel and provide protection to the wound.  Patient will continue taking the antibiotic prescribed earlier.  The patient was prescribed Lac-Hydrin to be applied to the dry cracked skin on both feet.    Cyndy verbalized agreement with and understanding of the rational for the diagnosis and treatment plan.  All questions were answered to best of my ability and the patient's satisfaction. The patient was advised to contact the clinic with any questions that may arise after the clinic visit.      Disclaimer: This note consists of symbols derived from keyboarding, dictation and/or voice recognition software. As a result, there may be errors in the script that have gone undetected.  Please consider this when interpreting information found in this chart.       BRIGIDA Robles D.P.M., MANI.F.ROXANNS.

## 2020-12-09 NOTE — LETTER
December 9, 2020      Cyndy Wang  [id number]  [facility] [id number]        To Whom It May Concern:    Cyndy Wang was seen in our clinic. She will not be able to return to work on Friday in order to recover from her office procedure on the foot.      Sincerely,        Red Robles, NIKO

## 2020-12-09 NOTE — LETTER
12/9/2020         RE: Cyndy Wang  [address]  [facility] [address]        Dear Colleague,    Thank you for referring your patient, Cyndy Wang, to the [facility]. Please see a copy of my visit note below.    PATIENT HISTORY:  Cyndy Wang is a 70 year old female with type 2 diabetes who presents to clinic with a chief complaint of a blister on the bottom of the right heel.  The patient relates that the problem has been going on for several days and is getting worse.  The patient was seen in urgent care and placed on oral antibiotics, Omnicef.  Previous notes and studies pertinent to the patient's condition were reviewed.      REVIEW OF SYSTEMS:  Constitutional, HEENT, cardiovascular, pulmonary, GI, , musculoskeletal, neuro, skin, endocrine and psych systems are negative, except as otherwise noted.     PAST MEDICAL HISTORY:   Past Medical History:   Diagnosis Date     Diabetes (H)      Hypertension      Obese      Thyroid disease      Type 2 diabetes mellitus with other diabetic kidney complication (H) 10/24/2015        PAST SURGICAL HISTORY:   Past Surgical History:   Procedure Laterality Date     REPAIR TENDON FINGER(S) Right 5/22/2015    Procedure: REPAIR TENDON FINGER(S);  Surgeon: Xavier Paulson MD;  Location: [facility]        MEDICATIONS:   Current Outpatient Medications:      aspirin (ASA) 81 MG chewable tablet, Take 1 tablet (81 mg) by mouth daily, Disp: , Rfl:      blood glucose (NO BRAND SPECIFIED) lancets standard, 1 each by In Vitro route 3 times daily Use to test blood sugar daily whatever brand her insurance will pay for, Disp: 100 each, Rfl: 1     blood glucose (NO BRAND SPECIFIED) test strip, 1 strip by In Vitro route 3 times daily Use to test blood sugars daily. What ever her insurance will pay for, Disp: 100 strip, Rfl: 1     blood glucose monitoring (NO BRAND SPECIFIED) meter device kit, by In Vitro route 3 times daily Use to test  blood sugar daily. What ever her insurance will pay for, Disp: 1 kit, Rfl: 0     cefdinir (OMNICEF) 300 MG capsule, Take 1 capsule (300 mg) by mouth 2 times daily for 10 days, Disp: 20 capsule, Rfl: 0     insulin glargine (LANTUS PEN) 100 UNIT/ML pen, Inject 8 Units Subcutaneous At Bedtime, Disp: 3 mL, Rfl: 3     insulin pen needle (31G X 5 MM) 31G X 5 MM miscellaneous, Use 1 pen needles daily or as directed., Disp: 30 each, Rfl: 3     insulin pen needle (B-D U/F) 31G X 8 MM miscellaneous, Use once daily or as directed., Disp: 30 each, Rfl: prn     levothyroxine (SYNTHROID/LEVOTHROID) 175 MCG tablet, Take 1 tablet (175 mcg) by mouth daily, Disp: 90 tablet, Rfl: 1     losartan (COZAAR) 50 MG tablet, TAKE ONE TABLET BY MOUTH ONCE DAILY, Disp: 90 tablet, Rfl: 3     pravastatin (PRAVACHOL) 20 MG tablet, Take 1 tablet (20 mg) by mouth daily, Disp: 90 tablet, Rfl: 3     topiramate (TOPAMAX) 25 MG tablet, Take 3 tablets (75 mg) by mouth At Bedtime, Disp: 90 tablet, Rfl: 6     venlafaxine (EFFEXOR-XR) 75 MG 24 hr capsule, TAKE THREE CAPSULES BY MOUTH EVERY DAY, Disp: 270 capsule, Rfl: 3     traMADol (ULTRAM) 50 MG tablet, 1 tab po q 4-6 hours PRN pain (Patient not taking: Reported on 10/8/2020), Disp: 10 tablet, Rfl: 0     triamcinolone (KENALOG) 0.1 % cream, Apply sparingly to affected area three times daily for 14 days. (Patient not taking: Reported on 10/8/2020), Disp: 15 g, Rfl: 0     ALLERGIES:    Allergies   Allergen Reactions     Sulfa Drugs Difficulty breathing     Lisinopril Cough     Penicillins Difficulty breathing        SOCIAL HISTORY:   Social History     Socioeconomic History     Marital status:      Spouse name: Not on file     Number of children: Not on file     Years of education: Not on file     Highest education level: Not on file   Occupational History     Not on file   Social Needs     Financial resource strain: Not on file     Food insecurity     Worry: Not on file     Inability: Not on file  "    Transportation needs     Medical: Not on file     Non-medical: Not on file   Tobacco Use     Smoking status: Former Smoker     Types: Cigarettes     Smokeless tobacco: Never Used     Tobacco comment: 1-2 cigarettes occasionally   Substance and Sexual Activity     Alcohol use: No     Alcohol/week: 0.0 standard drinks     Drug use: No     Sexual activity: Not Currently   Lifestyle     Physical activity     Days per week: Not on file     Minutes per session: Not on file     Stress: Not on file   Relationships     Social connections     Talks on phone: Not on file     Gets together: Not on file     Attends Confucianism service: Not on file     Active member of club or organization: Not on file     Attends meetings of clubs or organizations: Not on file     Relationship status: Not on file     Intimate partner violence     Fear of current or ex partner: Not on file     Emotionally abused: Not on file     Physically abused: Not on file     Forced sexual activity: Not on file   Other Topics Concern     Parent/sibling w/ CABG, MI or angioplasty before 65F 55M? No   Social History Narrative     Not on file        FAMILY HISTORY:   Family History   Problem Relation Age of Onset     Neurologic Disorder Mother         Stroke     Heart Disease Maternal Grandfather      C.A.D. Maternal Grandmother      Breast Cancer No family hx of      Cancer - colorectal No family hx of      Melanoma No family hx of         EXAM:Vitals: BP (!) 173/101   Pulse 87   Ht 1.727 m (5' 8\")   Wt 102.1 kg (225 lb)   BMI 34.21 kg/m              General appearance: Patient is alert and fully cooperative with history & exam.  No sign of distress is noted during the visit.     Psychiatric: Affect is pleasant & appropriate.  Patient appears motivated to improve health.     Respiratory: Breathing is regular & unlabored while sitting.     HEENT: Hearing is intact to spoken word.  Speech is clear.  No gross evidence of visual impairment that would impact " ambulation.     Dermatologic: Noted full-thickness blister or bulla on the plantar aspect of the right heel.  Surrounding erythema noted.  The blister is full of fluid.    Vascular: DP & PT pulses are intact & regular on the right.   CFT and skin temperature is normal to the right lower extremities.     Neurologic: Lower extremity sensation is intact to light touch.  No evidence of weakness in the right lower extremities.        Musculoskeletal: Patient is ambulatory without assistive device or brace.  No gross ankle deformity noted.  No foot or ankle joint effusion is noted.            ASSESSMENT / PLAN:     ICD-10-CM    1. Type 2 diabetes mellitus with other diabetic kidney complication (H)  E11.29    2. Blister of right heel with infection, initial encounter  S90.821A Orthopedic & Spine  Referral    L08.9        I have explained to Cyndy  about the conditions.  We discussed the underlying contributing factors of the condition as well as the treatment plan and expected length of recovery.  At this time, the blister was deroofed and the underlying full-thickness wound was sharply debrided down to healthy subcutaneous tissue with a #15 blade.  Purulent drainage noted.  The drainage was cultured and sent to microbiology for further testing.  The wound was dressed with a sterile bacitracin and bandage application.  The patient was referred to wound care for further evaluation of the wound and treatment options to promote healing.  The patient was fitted with a silicone heel cup to better offload the heel and provide protection to the wound.  Patient will continue taking the antibiotic prescribed earlier.  The patient was prescribed Lac-Hydrin to be applied to the dry cracked skin on both feet.    Cyndy verbalized agreement with and understanding of the rational for the diagnosis and treatment plan.  All questions were answered to best of my ability and the patient's satisfaction. The patient was advised to  contact the clinic with any questions that may arise after the clinic visit.      Disclaimer: This note consists of symbols derived from keyboarding, dictation and/or voice recognition software. As a result, there may be errors in the script that have gone undetected. Please consider this when interpreting information found in this chart.       BRIGIDA Robles D.P.M., FREJI.F.A.S.        Again, thank you for allowing me to participate in the care of your patient.        Sincerely,        Red Robles DPM

## 2020-12-11 ENCOUNTER — HOSPITAL ENCOUNTER (OUTPATIENT)
Dept: WOUND CARE | Facility: CLINIC | Age: 70
Discharge: HOME | End: 2020-12-11
Attending: SURGERY | Admitting: SURGERY
Payer: COMMERCIAL

## 2020-12-11 DIAGNOSIS — L97.519 TYPE 2 DIABETES MELLITUS WITH RIGHT DIABETIC FOOT ULCER (H): ICD-10-CM

## 2020-12-11 DIAGNOSIS — E11.621 TYPE 2 DIABETES MELLITUS WITH RIGHT DIABETIC FOOT ULCER (H): ICD-10-CM

## 2020-12-11 DIAGNOSIS — E11.29 TYPE 2 DIABETES MELLITUS WITH OTHER DIABETIC KIDNEY COMPLICATION (H): ICD-10-CM

## 2020-12-11 PROCEDURE — G0463 HOSPITAL OUTPT CLINIC VISIT: HCPCS

## 2020-12-11 PROCEDURE — A6211 FOAM DRG > 48 SQ IN W/O BRDR: HCPCS

## 2020-12-11 NOTE — DISCHARGE INSTRUCTIONS
Right heel Dressing Change  Wash your hands before and after the dressing change. You may wear gloves if you choose. Gloves are available over the counter at [facility], [facility], [facility], etc.   Use scissors at home that you can designate solely for wound care and cleanse well with each use, (rubbing alcohol or alcohol pads work well for this).    1.) Cleanse wound with Vashe wound cleanser on gauze, dry well.   2.) Apply a small amount of Thick Moisture Barrier Paste to surrounding skin, (taking care to avoid where the tape will go)  3.) Cut a piece of Mepilex Ag foam and remove the plastic backing the stick in place  4.) Secure with Medipore tape   Change dressing daily        Follow up next week    Signs and symptoms of infection:  Bright green drainage  Foul odor  Increasing pain in area  New redness or swelling at the site of the wound or streaks up from wound  New warmth to touch around wound accompanied by redness and swelling  Fever    Need to be seen as soon as possible for infection concerns.    Please call with any questions or concerns.      Deedee Varner RN, CWOCN   [phone]

## 2020-12-14 ENCOUNTER — HEALTH MAINTENANCE LETTER (OUTPATIENT)
Age: 70
End: 2020-12-14

## 2020-12-15 ENCOUNTER — HOSPITAL ENCOUNTER (OUTPATIENT)
Dept: WOUND CARE | Facility: CLINIC | Age: 70
Discharge: HOME | End: 2020-12-15
Attending: NURSE PRACTITIONER | Admitting: NURSE PRACTITIONER
Payer: COMMERCIAL

## 2020-12-15 LAB
BACTERIA SPEC CULT: ABNORMAL
Lab: ABNORMAL
SPECIMEN SOURCE: ABNORMAL

## 2020-12-15 PROCEDURE — A6211 FOAM DRG > 48 SQ IN W/O BRDR: HCPCS

## 2020-12-15 PROCEDURE — G0463 HOSPITAL OUTPT CLINIC VISIT: HCPCS

## 2020-12-15 NOTE — PROGRESS NOTES
Reason For Visit: Cyndy Wang, 70 year old female, seen as outpatient to evaluate and treat right heel wound. Referred by Dr. Robles.    History: Pt noted injury at the beginning of this month, is not really sure what happened but thinks she may have stepped on something.  She presented to the ER on 12/7/20 due to pain in area, was prescribed antibiotics and referred to Dr. Robles, podiatry.  Seen by Dr. Robles 12/9/20 and area was debrided and cultured, she was also prescribed a silicone heel cup for shoe and lac-hydrin for dry skin. Culture showing multiple microbes but sensitivities available at time of this initial appointment.     History is significant for DM II with poor control, HgbA1C in May '20 was 12.6.  Pt states it is her fault.  She has been advised to go in insulin but refuses.  Has been on Victoza in past but not currently, can't afford.  Provider has made attempts to get assistance for her to pay for medications but she has not responded to these attempts.  Admits to poor diet.      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     Objective:   Current treatment plan: bandaide      Wound #1        Stage/tissue depth: partial versus full thickness  2.8 cm L x 3.3 cm W x 0 cm D overall with central island of epithelium 1.3x1.2x0.1cm  Tunneling: no  Undermining: no  Wound bed type/amount: central area is red and clean, island of epithelium, this is surrounded by intact epithelium then distally along blister edges is drying red/yellowis rim ; non fluctuant  Wound Edges: some loose blister roof in periphery  Periwound: mild pink, not warm to touch  Drainage: moderate serosanguinous  Odor: no  Pain: mildly uncomfortable but not painful      Dorsalis Pedal Pulse: palpable: yes doppler: triphasic  Posterior Tibial Pulse: palpable: yes doppler: triphasic  Hair growth: yes leg and feet  Capillary Refill: <2 seconds  Feet/toes color: pink  No edema noted    Mobility: independant  Current  offloading/footwear: athletic shoes, has been provided a gel heel cup by podiatrist  Sensation: intact to light touch  HgbA1C: 12.6 Date: May '20  Checks Blood Glucose?:  Yes, sometimes Average Readings: 400's  Other callousing/areas of concern: none noted right foot    Diet: poor choices for diabetes  Smoking:     Discussed: Diabetes and poor BG effects on feet and circulation over long term, and effects on healing.  Moist wound healing, wound cares.  Diabetic foot care, Nutrition in wound healing.      Assessment:  Right heel wound of unknown cause, possible injury, with infection, appears to be in early stages of healing      Factors impacting wound healing:   Poor nutrition: inadequate supply of protein, carbohydrates, fatty acids, and trace elements essential for all phases of wound healing  Underlying Disease: diabetes mellitis in poor control  Maceration: reduces wound tensile strength and inhibits epithelial migration  Patient compliance  Unrelieved pressure      Plan:    Topical care: Recommend Mepilex Ag foam for absorption/antimicrobial, padding, secure with Medipore tape.  Change daily for now.  Offloading: Gel heel cup.  Recommended limited weight bearing as able.  She has a cane, discussed using this to help offset some weight here.  She is able to be off work until next Tuesday and will try to stay off foot  Additional recommendations: If not healing, may need imaging to rule out foreign body    Wound Care: Wound cleansed with Vashe and gauze, patted dry. Covered with Mepilex Ag foam. Secured with Medipore tape. To be changed daily for now.    Discussed plan of care with patient, pt is able and willing to perform.    The following discharge instructions were reviewed with and sent home with the patient:  Right heel Dressing Change  Wash your hands before and after the dressing change. You may wear gloves if you choose. Gloves are available over the counter at [employer], [employer], [employer], etc.   Use  scissors at home that you can designate solely for wound care and cleanse well with each use, (rubbing alcohol or alcohol pads work well for this).    1.) Cleanse wound with Vashe wound cleanser on gauze, dry well.   2.) Apply a small amount of Thick Moisture Barrier Paste to surrounding skin, (taking care to avoid where the tape will go)  3.) Cut a piece of Mepilex Ag foam and remove the plastic backing the stick in place  4.) Secure with Medipore tape   Change dressing daily      The following supplies were sent home with the patient:   Remainder Mepilex Border 8x8, vashe, barrier paste and medipore tape.  Will reassess care plan in 1 week and order patient supplies as needed    Return visit: 12/15/20    Verbal, written, & demonstrative education provided.  Face to face time: approximately 45 minutes.  Procedure: none    Deedee Varner RN, CWOCN     [phone]

## 2020-12-17 NOTE — PROGRESS NOTES
Reason For Visit/Interval History: Cyndy Wang, 70 year old female, seen as outpatient for follow-up for right heel wound. Referred by Dr. Robles.  States she thinks wound is better.    History: Pt noted injury at the beginning of this month, is not really sure what happened but thinks she may have stepped on something.  She presented to the ER on 12/7/20 due to pain in area, was prescribed antibiotics and referred to Dr. Robles, podiatry.  Seen by Dr. Robles 12/9/20 and area was debrided and cultured, she was also prescribed a silicone heel cup for shoe and lac-hydrin for dry skin. Culture showing multiple microbes but sensitivities available at time of this initial appointment.     History is significant for DM II with poor control, HgbA1C in May '20 was 12.6.  Pt states it is her fault.  She has been advised to go in insulin but refuses.  Has been on Victoza in past but not currently, can't afford.  Provider has made attempts to get assistance for her to pay for medications but she has not responded to these attempts.  Admits to poor diet.      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     Objective:   Current treatment plan: Mepilex Ag foam  Last changed: this morning      Wound #1        Stage/tissue depth: partial thickness  0.3 cm L x 0.3 cm W x 0 cm D   Tunneling: no  Undermining: no  Wound bed type/amount: red and clean, intact epithelium then distally some thick blister edges with intact skin under; non fluctuant  Wound Edges: attached, some loose blister roof in periphery  Periwound: mild pink, not warm to touch  Drainage: moderate serosanguinous  Odor: no  Pain: mildly uncomfortable but not painful      Dorsalis Pedal Pulse: palpable: yes doppler: triphasic  Posterior Tibial Pulse: palpable: yes doppler: triphasic  Hair growth: yes leg and feet  Capillary Refill: <2 seconds  Feet/toes color: pink  No edema noted    Mobility: independant  Current offloading/footwear: athletic shoes,  has been provided a gel heel cup by podiatrist  Sensation: intact to light touch  HgbA1C: 12.6 Date: May '20  Checks Blood Glucose?:  Yes, sometimes Average Readings: 400's  Other callousing/areas of concern: none noted right foot    Diet: poor choices for diabetes  Smoking:     Discussed: Diabetes and poor BG effects on feet and circulation over long term, and effects on healing.  Moist wound healing, wound cares.  Diabetic foot care, Nutrition in wound healing.      Assessment:  Right heel wound of unknown cause, possible injury, with infection, healing well and nearly resolved    Factors impacting wound healing:   Poor nutrition: inadequate supply of protein, carbohydrates, fatty acids, and trace elements essential for all phases of wound healing  Underlying Disease: diabetes mellitis in poor control  Maceration: reduces wound tensile strength and inhibits epithelial migration  Patient compliance  Unrelieved pressure      Plan:    Topical care: Continue Mepilex Ag foam for absorption/antimicrobial, padding, secure with Medipore tape.  Change every other day.  Offloading: Gel heel cup.  Recommended limited weight bearing as able.  She has a cane, discussed using this to help offset some weight here. Works short shifts.  Will contact us if wound is declining once back to work and would then have her follow-up with [facility] nurse and podiatrist and can look orders for time off from work    Additional recommendations: none, discussed need to work on diabetes control.  She has admitted that this has been eye opening though not sure she will change her ways    Wound Care: Wound cleansed with Vashe and gauze, patted dry. Covered with Mepilex Ag foam. Secured with Medipore tape. To be changed daily for now.    Discussed plan of care with patient, pt is able and willing to perform.    The following discharge instructions were reviewed with and sent home with the patient (prior visit):  Right heel Dressing Change  Wash your  hands before and after the dressing change. You may wear gloves if you choose. Gloves are available over the counter at [facility], [facility], [facility], etc.   Use scissors at home that you can designate solely for wound care and cleanse well with each use, (rubbing alcohol or alcohol pads work well for this).    1.) Cleanse wound with Vashe wound cleanser on gauze, dry well.   2.) Apply a small amount of Thick Moisture Barrier Paste to surrounding skin, (taking care to avoid where the tape will go)  3.) Cut a piece of Mepilex Ag foam and remove the plastic backing the stick in place  4.) Secure with Medipore tape   Change dressing daily      The following supplies were sent home with the patient:   Remainder Mepilex Border 8x8    Return visit: PRN    Verbal, written, & demonstrative education provided.  Face to face time: approximately 30 minutes.  Procedure: none    Deedee Varner RN, CWOCN     [phone]

## 2021-01-15 ENCOUNTER — TELEPHONE (OUTPATIENT)
Dept: FAMILY MEDICINE | Facility: CLINIC | Age: 71
End: 2021-01-15

## 2021-01-15 NOTE — TELEPHONE ENCOUNTER
Message given to patient with good understanding.  Wanted to call back to schedule  Tequila Mallory on 1/15/2021 at 4:56 PM

## 2021-01-15 NOTE — TELEPHONE ENCOUNTER
LM for patient to return call - please assist in scheduling a virtual or F2F visit for diabetes. Pt is also due for yearly medicare exam if willing, add to appt notes. Please schedule lab visit prior to appointment.     Amy Mack on 1/15/2021 at 11:14 AM

## 2021-01-15 NOTE — TELEPHONE ENCOUNTER
Call patient and assist in scheduling Video or FTF visit for diabetes.  Labs have been previously ordered - schedule lab visit as well do prior to visit.    SINDHU Gustafson

## 2021-01-29 DIAGNOSIS — E03.9 HYPOTHYROIDISM, UNSPECIFIED TYPE: ICD-10-CM

## 2021-02-01 NOTE — TELEPHONE ENCOUNTER
"Requested Prescriptions   Pending Prescriptions Disp Refills     levothyroxine (SYNTHROID/LEVOTHROID) 175 MCG tablet [Pharmacy Med Name: Levothyroxine Sodium 175 MCG Oral Tablet] 90 tablet 0     Sig: Take 1 tablet by mouth once daily       Thyroid Protocol Failed - 1/29/2021  5:20 PM        Failed - Normal TSH on file in past 12 months     Recent Labs   Lab Test 05/20/20  1515   TSH 5.27*              Passed - Patient is 12 years or older        Passed - Recent (12 mo) or future (30 days) visit within the authorizing provider's specialty     Patient has had an office visit with the authorizing provider or a provider within the authorizing providers department within the previous 12 mos or has a future within next 30 days. See \"Patient Info\" tab in inbasket, or \"Choose Columns\" in Meds & Orders section of the refill encounter.              Passed - Medication is active on med list        Passed - No active pregnancy on record     If patient is pregnant or has had a positive pregnancy test, please check TSH.          Passed - No positive pregnancy test in past 12 months     If patient is pregnant or has had a positive pregnancy test, please check TSH.               "

## 2021-02-02 RX ORDER — LEVOTHYROXINE SODIUM 175 UG/1
TABLET ORAL
Qty: 30 TABLET | Refills: 0 | Status: SHIPPED | OUTPATIENT
Start: 2021-02-02 | End: 2021-03-05

## 2021-02-03 NOTE — TELEPHONE ENCOUNTER
Message left for patient to check the pharmacy.  Needs labs for further refills. Zaynab JOHANSEN RN

## 2021-02-23 DIAGNOSIS — F33.1 MAJOR DEPRESSIVE DISORDER, RECURRENT EPISODE, MODERATE (H): ICD-10-CM

## 2021-02-23 NOTE — TELEPHONE ENCOUNTER
"Requested Prescriptions   Pending Prescriptions Disp Refills     venlafaxine (EFFEXOR-XR) 75 MG 24 hr capsule [Pharmacy Med Name: VENLAFAXINE HCL ER 75MG CP24] 270 capsule 3     Sig: TAKE THREE CAPSULES BY MOUTH ONCE DAILY       Serotonin-Norepinephrine Reuptake Inhibitors  Failed - 2/23/2021  9:15 AM        Failed - Blood pressure under 140/90 in past 12 months     BP Readings from Last 3 Encounters:   12/09/20 (!) 173/101   12/07/20 (!) 154/98   09/11/20 (!) 159/104                 Failed - PHQ-9 score of less than 5 in past 6 months     Please review last PHQ-9 score.           Passed - Medication is active on med list        Passed - Patient is age 18 or older        Passed - No active pregnancy on record        Passed - Normal serum creatinine on file in past 12 months     Recent Labs   Lab Test 05/20/20  1515   CR 0.83       Ok to refill medication if creatinine is low          Passed - No positive pregnancy test in past 12 months        Passed - Recent (6 mo) or future (30 days) visit within the authorizing provider's specialty     Patient had office visit in the last 6 months or has a visit in the next 30 days with authorizing provider or within the authorizing provider's specialty.  See \"Patient Info\" tab in inbasket, or \"Choose Columns\" in Meds & Orders section of the refill encounter.                 "

## 2021-02-26 RX ORDER — VENLAFAXINE HYDROCHLORIDE 75 MG/1
CAPSULE, EXTENDED RELEASE ORAL
Qty: 270 CAPSULE | Refills: 3 | Status: SHIPPED | OUTPATIENT
Start: 2021-02-26

## 2021-02-27 ENCOUNTER — HEALTH MAINTENANCE LETTER (OUTPATIENT)
Age: 71
End: 2021-02-27

## 2021-03-04 DIAGNOSIS — E11.29 TYPE 2 DIABETES MELLITUS WITH OTHER DIABETIC KIDNEY COMPLICATION (H): ICD-10-CM

## 2021-03-04 DIAGNOSIS — I10 HYPERTENSION, GOAL BELOW 140/90: ICD-10-CM

## 2021-03-04 DIAGNOSIS — R79.89 ELEVATED LIVER FUNCTION TESTS: ICD-10-CM

## 2021-03-04 LAB
ALBUMIN SERPL-MCNC: 3.2 G/DL (ref 3.4–5)
ALP SERPL-CCNC: 161 U/L (ref 40–150)
ALT SERPL W P-5'-P-CCNC: 56 U/L (ref 0–50)
ANION GAP SERPL CALCULATED.3IONS-SCNC: 6 MMOL/L (ref 3–14)
AST SERPL W P-5'-P-CCNC: 35 U/L (ref 0–45)
BILIRUB DIRECT SERPL-MCNC: <0.1 MG/DL (ref 0–0.2)
BILIRUB SERPL-MCNC: 0.4 MG/DL (ref 0.2–1.3)
BUN SERPL-MCNC: 25 MG/DL (ref 7–30)
CALCIUM SERPL-MCNC: 9 MG/DL (ref 8.5–10.1)
CHLORIDE SERPL-SCNC: 99 MMOL/L (ref 94–109)
CO2 SERPL-SCNC: 27 MMOL/L (ref 20–32)
CREAT SERPL-MCNC: 1.23 MG/DL (ref 0.52–1.04)
CREAT UR-MCNC: 83 MG/DL
GFR SERPL CREATININE-BSD FRML MDRD: 44 ML/MIN/{1.73_M2}
GLUCOSE SERPL-MCNC: 361 MG/DL (ref 70–99)
HBA1C MFR BLD: 12 % (ref 0–5.6)
MICROALBUMIN UR-MCNC: 869 MG/L
MICROALBUMIN/CREAT UR: 1043.22 MG/G CR (ref 0–25)
POTASSIUM SERPL-SCNC: 4.3 MMOL/L (ref 3.4–5.3)
PROT SERPL-MCNC: 7.5 G/DL (ref 6.8–8.8)
SODIUM SERPL-SCNC: 132 MMOL/L (ref 133–144)

## 2021-03-04 PROCEDURE — 36415 COLL VENOUS BLD VENIPUNCTURE: CPT | Performed by: NURSE PRACTITIONER

## 2021-03-04 PROCEDURE — 83036 HEMOGLOBIN GLYCOSYLATED A1C: CPT | Performed by: NURSE PRACTITIONER

## 2021-03-04 PROCEDURE — 80076 HEPATIC FUNCTION PANEL: CPT | Performed by: NURSE PRACTITIONER

## 2021-03-04 PROCEDURE — 82043 UR ALBUMIN QUANTITATIVE: CPT | Performed by: NURSE PRACTITIONER

## 2021-03-04 PROCEDURE — 80048 BASIC METABOLIC PNL TOTAL CA: CPT | Performed by: NURSE PRACTITIONER

## 2021-03-05 ENCOUNTER — VIRTUAL VISIT (OUTPATIENT)
Dept: FAMILY MEDICINE | Facility: CLINIC | Age: 71
End: 2021-03-05
Payer: COMMERCIAL

## 2021-03-05 DIAGNOSIS — F33.1 MAJOR DEPRESSIVE DISORDER, RECURRENT EPISODE, MODERATE (H): ICD-10-CM

## 2021-03-05 DIAGNOSIS — E78.5 HYPERLIPIDEMIA LDL GOAL <100: ICD-10-CM

## 2021-03-05 DIAGNOSIS — E11.29 TYPE 2 DIABETES MELLITUS WITH OTHER DIABETIC KIDNEY COMPLICATION (H): ICD-10-CM

## 2021-03-05 DIAGNOSIS — I10 HYPERTENSION, GOAL BELOW 140/90: ICD-10-CM

## 2021-03-05 DIAGNOSIS — E03.9 HYPOTHYROIDISM, UNSPECIFIED TYPE: ICD-10-CM

## 2021-03-05 DIAGNOSIS — E66.09 OBESITY DUE TO EXCESS CALORIES, UNSPECIFIED OBESITY SEVERITY: ICD-10-CM

## 2021-03-05 DIAGNOSIS — F50.819 BINGE EATING DISORDER: ICD-10-CM

## 2021-03-05 DIAGNOSIS — E55.9 VITAMIN D DEFICIENCY DISEASE: Primary | ICD-10-CM

## 2021-03-05 PROCEDURE — 99443 PR PHYSICIAN TELEPHONE EVALUATION 21-30 MIN: CPT | Mod: 95 | Performed by: NURSE PRACTITIONER

## 2021-03-05 RX ORDER — LOSARTAN POTASSIUM 50 MG/1
TABLET ORAL
Qty: 90 TABLET | Refills: 3 | Status: SHIPPED | OUTPATIENT
Start: 2021-03-05

## 2021-03-05 RX ORDER — TOPIRAMATE 25 MG/1
TABLET, FILM COATED ORAL
Qty: 120 TABLET | Refills: 6 | Status: SHIPPED | OUTPATIENT
Start: 2021-03-05 | End: 2021-03-11

## 2021-03-05 RX ORDER — PRAVASTATIN SODIUM 20 MG
20 TABLET ORAL DAILY
Qty: 90 TABLET | Refills: 3 | Status: SHIPPED | OUTPATIENT
Start: 2021-03-05

## 2021-03-05 RX ORDER — LEVOTHYROXINE SODIUM 175 UG/1
175 TABLET ORAL DAILY
Qty: 90 TABLET | Refills: 3 | Status: SHIPPED | OUTPATIENT
Start: 2021-03-05 | End: 2023-07-20

## 2021-03-05 ASSESSMENT — ANXIETY QUESTIONNAIRES
7. FEELING AFRAID AS IF SOMETHING AWFUL MIGHT HAPPEN: NOT AT ALL
5. BEING SO RESTLESS THAT IT IS HARD TO SIT STILL: NOT AT ALL
2. NOT BEING ABLE TO STOP OR CONTROL WORRYING: NOT AT ALL
IF YOU CHECKED OFF ANY PROBLEMS ON THIS QUESTIONNAIRE, HOW DIFFICULT HAVE THESE PROBLEMS MADE IT FOR YOU TO DO YOUR WORK, TAKE CARE OF THINGS AT HOME, OR GET ALONG WITH OTHER PEOPLE: NOT DIFFICULT AT ALL
3. WORRYING TOO MUCH ABOUT DIFFERENT THINGS: NOT AT ALL
GAD7 TOTAL SCORE: 1
1. FEELING NERVOUS, ANXIOUS, OR ON EDGE: NOT AT ALL
6. BECOMING EASILY ANNOYED OR IRRITABLE: SEVERAL DAYS

## 2021-03-05 ASSESSMENT — PATIENT HEALTH QUESTIONNAIRE - PHQ9
5. POOR APPETITE OR OVEREATING: NOT AT ALL
SUM OF ALL RESPONSES TO PHQ QUESTIONS 1-9: 2

## 2021-03-05 NOTE — PROGRESS NOTES
3Mpeña is a 71 year old who is being evaluated via a billable telephone visit.      What phone number would you like to be contacted at? [phone]  How would you like to obtain your AVS? Mychart    Assessment & Plan     Type 2 diabetes mellitus with other diabetic kidney complication (H)   Not controlled reviewed labs in detail   - losartan (COZAAR) 50 MG tablet; TAKE ONE TABLET BY MOUTH ONCE DAILY  - topiramate (TOPAMAX) 25 MG tablet; Take 1 tablet (25 mg) by mouth At Bedtime for 7 days, THEN 1 tablet (25 mg) 2 times daily for 7 days, THEN 2 tablets (50 mg) 2 times daily.  - Albumin Random Urine Quantitative with Creat Ratio  - AMBULATORY ADULT DIABETES EDUCATOR REFERRAL; Future  - Albumin Random Urine Quantitative with Creat Ratio; Future    Hyperlipidemia LDL goal <100     - pravastatin (PRAVACHOL) 20 MG tablet; Take 1 tablet (20 mg) by mouth daily    Obesity due to excess calories, unspecified obesity severity  Discussed weight loss - diet changes.  Topamax restart for binge eating disorder.    - topiramate (TOPAMAX) 25 MG tablet; Take 1 tablet (25 mg) by mouth At Bedtime for 7 days, THEN 1 tablet (25 mg) 2 times daily for 7 days, THEN 2 tablets (50 mg) 2 times daily.  - Albumin Random Urine Quantitative with Creat Ratio  - AMBULATORY ADULT DIABETES EDUCATOR REFERRAL; Future    Binge eating disorder     - topiramate (TOPAMAX) 25 MG tablet; Take 1 tablet (25 mg) by mouth At Bedtime for 7 days, THEN 1 tablet (25 mg) 2 times daily for 7 days, THEN 2 tablets (50 mg) 2 times daily.    Major depressive disorder, recurrent episode, moderate (H)       Hypothyroidism, unspecified type  Recheck TSH.    - levothyroxine (SYNTHROID/LEVOTHROID) 175 MCG tablet; Take 1 tablet (175 mcg) by mouth daily  - TSH with free T4 reflex; Future    Vitamin D deficiency disease     - Vitamin D Deficiency; Future    Hypertension, goal below 140/90  Recheck BP in clinic 2-3 months.    - Basic metabolic panel; Future      BMI:   Estimated  "body mass index is 34.21 kg/m  as calculated from the following:    Height as of 12/9/20: 1.727 m (5' 8\").    Weight as of 12/9/20: 102.1 kg (225 lb).   Weight management plan: Discussed healthy diet and exercise guidelines    Work on weight loss  Regular exercise  There are no Patient Instructions on file for this visit.    No follow-ups on file.    La Anderson NP  [facility]    Florence Naylor is a 71 year old who presents for the following health issues     HPI       Diabetes Follow-up      How often are you checking your blood sugar? Not at all    What concerns do you have today about your diabetes? None - just states she is acknowledging the fact that she has diabetes now.      Do you have any of these symptoms? (Select all that apply)  Numbness in feet    Have you had a diabetic eye exam in the last 12 months? No    Stopped all medications - was prescribed Victoza and Lantus and stopped due to cost.  Referred to diabetic education at last visit and never followed up.  Was doing well with diet until recently now back to 8-10 cans of pop per day and diet is poor.    Medication Followup of Topiramate     Taking Medication as prescribed: yes    Side Effects:  None    Medication Helping Symptoms:  Yes    Would like to discuss dosing      URINARY TRACT SYMPTOMS      Duration: intermittently - all the time.     Description  Urinary odor, frequency - but also trying to drink 100ml of water a day.     Intensity:  mild    Accompanying signs and symptoms:  Fever/chills: no   Flank pain no   Nausea and vomiting: no   Vaginal symptoms: none  Abdominal/Pelvic Pain: no     History  History of frequent UTI's: YES  History of kidney stones: no   Sexually Active: no   Possibility of pregnancy: No    Precipitating or alleviating factors: None    Therapies tried and outcome: increased fluids.   Outcome: NA       Hyperlipidemia Follow-Up      Are you regularly taking any medication or supplement " to lower your cholesterol?   Yes- pravastatin    Are you having muscle aches or other side effects that you think could be caused by your cholesterol lowering medication?  No    Hypertension Follow-up      Do you check your blood pressure regularly outside of the clinic? No . Wants an at home BP cuff.     Are you following a low salt diet? No    Are your blood pressures ever more than 140 on the top number (systolic) OR more   than 90 on the bottom number (diastolic), for example 140/90? Yes    BP Readings from Last 2 Encounters:   12/09/20 (!) 173/101   12/07/20 (!) 154/98     Hemoglobin A1C (%)   Date Value   03/04/2021 12.0 (H)   05/20/2020 12.6 (H)     LDL Cholesterol Calculated (mg/dL)   Date Value   01/08/2020     Cannot estimate LDL when triglyceride exceeds 400 mg/dL   06/26/2019 102 (H)     LDL Cholesterol Direct (mg/dL)   Date Value   01/08/2020 149 (H)       Depression and Anxiety Follow-Up    How are you doing with your depression since your last visit? No change    How are you doing with your anxiety since your last visit?  No change    Are you having other symptoms that might be associated with depression or anxiety? No    Have you had a significant life event? OTHER: brother has pancreatic cancer. States she is is worried about him and also trying to get health under control as well.      Do you have any concerns with your use of alcohol or other drugs? No    Social History     Tobacco Use     Smoking status: Former Smoker     Types: Cigarettes     Smokeless tobacco: Never Used     Tobacco comment: 1-2 cigarettes occasionally   Substance Use Topics     Alcohol use: No     Alcohol/week: 0.0 standard drinks     Drug use: No     PHQ 1/17/2019 4/15/2020 3/5/2021   PHQ-9 Total Score 4 5 2   Q9: Thoughts of better off dead/self-harm past 2 weeks Not at all Not at all Not at all     ABRAHAM-7 SCORE 8/24/2017 1/17/2019 3/5/2021   Total Score - - -   Total Score 3 2 1     Last PHQ-9 3/5/2021   1.  Little interest  or pleasure in doing things 0   2.  Feeling down, depressed, or hopeless 0   3.  Trouble falling or staying asleep, or sleeping too much 1   4.  Feeling tired or having little energy 1   5.  Poor appetite or overeating 0   6.  Feeling bad about yourself 0   7.  Trouble concentrating 0   8.  Moving slowly or restless 0   Q9: Thoughts of better off dead/self-harm past 2 weeks 0   PHQ-9 Total Score 2   Difficulty at work, home, or with people Somewhat difficult     ABRAHAM-7  3/5/2021   1. Feeling nervous, anxious, or on edge 0   2. Not being able to stop or control worrying 0   3. Worrying too much about different things 0   4. Trouble relaxing 0   5. Being so restless that it is hard to sit still 0   6. Becoming easily annoyed or irritable 1   7. Feeling afraid, as if something awful might happen 0   ABRAHAM-7 Total Score 1   If you checked any problems, how difficult have they made it for you to do your work, take care of things at home, or get along with other people? Not difficult at all       Suicide Assessment Five-step Evaluation and Treatment (SAFE-T)     Hypothyroidism Follow-up      Since last visit, patient describes the following symptoms: Weight stable, no hair loss, no skin changes, no constipation, no loose stools, anxiety and depression      How many servings of fruits and vegetables do you eat daily?  4 or more    On average, how many sweetened beverages do you drink each day (Examples: soda, juice, sweet tea, etc.  Do NOT count diet or artificially sweetened beverages)?   2    How many days per week do you exercise enough to make your heart beat faster? 3 or less    How many minutes a day do you exercise enough to make your heart beat faster? 9 or less    How many days per week do you miss taking your medication? 0         Review of Systems   Constitutional, HEENT, cardiovascular, pulmonary, GI, , musculoskeletal, neuro, skin, endocrine and psych systems are negative, except as otherwise noted.       Objective           Vitals:  No vitals were obtained today due to virtual visit.    Physical Exam   alert and no distress  PSYCH: Alert and oriented times 3; coherent speech, normal   rate and volume, able to articulate logical thoughts, able   to abstract reason, no tangential thoughts, no hallucinations   or delusions  Her affect is normal  RESP: No cough, no audible wheezing, able to talk in full sentences  Remainder of exam unable to be completed due to telephone visits         Phone call duration: 21 minutes

## 2021-03-06 ASSESSMENT — ANXIETY QUESTIONNAIRES: GAD7 TOTAL SCORE: 1

## 2021-03-08 ENCOUNTER — TELEPHONE (OUTPATIENT)
Dept: FAMILY MEDICINE | Facility: CLINIC | Age: 71
End: 2021-03-08

## 2021-03-08 DIAGNOSIS — E66.09 OBESITY DUE TO EXCESS CALORIES, UNSPECIFIED OBESITY SEVERITY: ICD-10-CM

## 2021-03-08 DIAGNOSIS — E11.29 TYPE 2 DIABETES MELLITUS WITH OTHER DIABETIC KIDNEY COMPLICATION (H): ICD-10-CM

## 2021-03-08 DIAGNOSIS — G44.219 EPISODIC TENSION-TYPE HEADACHE, NOT INTRACTABLE: ICD-10-CM

## 2021-03-08 DIAGNOSIS — F50.819 BINGE EATING DISORDER: ICD-10-CM

## 2021-03-08 DIAGNOSIS — G35 MS (MULTIPLE SCLEROSIS) (H): Primary | ICD-10-CM

## 2021-03-08 NOTE — TELEPHONE ENCOUNTER
Question set received from the insurance and was placed in the [facility] FOLDER for completion     Prior Authorization Retail Medication Request    Medication/Dose: topiramate (TOPAMAX) 25 MG tablet  ICD code (if different than what is on RX):  Type 2 diabetes mellitus with other diabetic kidney complication (H) (E11.29); Obesity due to excess calories, unspecified obesity severity (E66.09); Binge eating disorder (F50.81)  Previously Tried and Failed:    Rationale:      Insurance Name:  [employer]   Insurance ID: [id number]      Pharmacy Information (if different than what is on RX)  Name:  [facility] [place], [place] - [address]  Phone: [phone]

## 2021-03-10 NOTE — TELEPHONE ENCOUNTER
PRIOR AUTHORIZATION DENIED    Medication: topiramate (TOPAMAX) 25 MG tablet-DENIED    Denial Date: 3/10/2021    Denial Rational: DRUG IS NOT BEING USED TO TREAT A DIAGNOSIS/CONDITION THAT IS COVERED UNDER THE MEDICARE D BENEFIT.        Appeal Information:              Central Prior Authorization Team  Phone: [phone]

## 2021-03-11 RX ORDER — TOPIRAMATE 25 MG/1
TABLET, FILM COATED ORAL
Qty: 120 TABLET | Refills: 6 | Status: SHIPPED | OUTPATIENT
Start: 2021-03-11 | End: 2021-09-21

## 2021-03-11 NOTE — TELEPHONE ENCOUNTER
AYESHA Gustafson for topiramate has been denied.    Denial Rational: DRUG IS NOT BEING USED TO TREAT A DIAGNOSIS/CONDITION THAT IS COVERED UNDER THE MEDICARE D BENEFIT    Laura Alejandro

## 2021-03-16 ENCOUNTER — TELEPHONE (OUTPATIENT)
Dept: EDUCATION SERVICES | Facility: CLINIC | Age: 71
End: 2021-03-16

## 2021-03-16 NOTE — TELEPHONE ENCOUNTER
Called out to Cyndy Wang 4:38 PM 3/16/2021 to assist with scheduling for Diabetes education and review patient assistance programs for medication costs.  No answer, left call back number on voicemail and generic message.     Patrica Tineo RD, LD, CDE  Diabetes Education

## 2021-03-18 ENCOUNTER — TELEPHONE (OUTPATIENT)
Dept: FAMILY MEDICINE | Facility: CLINIC | Age: 71
End: 2021-03-18

## 2021-03-18 ENCOUNTER — TELEPHONE (OUTPATIENT)
Dept: EDUCATION SERVICES | Facility: CLINIC | Age: 71
End: 2021-03-18

## 2021-03-18 NOTE — TELEPHONE ENCOUNTER
Diabetes Education Scheduling Outreach #1:    Call to patient to schedule. Patient is scheduled for 04/08.    Kaylie Nieto  [facility]  Diabetes and Nutrition Scheduling

## 2021-03-18 NOTE — TELEPHONE ENCOUNTER
Reason for Call:  Other call back    Detailed comments: Pt want to talk to kellen regarding her diabetes and monitor supply.     Phone Number Patient can be reached at: Home number on file [phone] (home)    Best Time: any time    Can we leave a detailed message on this number? YES    Call taken on 3/18/2021 at 12:20 PM by Galina Burciaga

## 2021-03-23 NOTE — TELEPHONE ENCOUNTER
Please call to let patient know if you are going to do the no finger pricks teaching at her diabetic ed visit.    [phone]

## 2021-03-24 NOTE — TELEPHONE ENCOUNTER
Left generic voicemail that we can certainly discuss any topic that she'd like to focus on during her education with us, and left our triage number in case she has additional questions before her appointment 4/8    MACI Wilde CDE

## 2021-03-26 ENCOUNTER — TELEPHONE (OUTPATIENT)
Dept: EDUCATION SERVICES | Facility: CLINIC | Age: 71
End: 2021-03-26

## 2021-03-26 NOTE — TELEPHONE ENCOUNTER
Patient called triage line with questions regarding insulin for her visit on 4/8/21.  Blood sugars are 300-400s but doesn't want to go onto insulin due to cost, but due to new stimulus money she is considering picking up and starting the Lantus.  Pharmacy reports cost was $105.  Reviewed recommended starting dose was 8 units.  Stated at the appointment on 4/8 the educator can go over alternative cost options.      Patient would like to feel better and would like to get things started so she plans to  and start insulin.  Advised patient she can increase dose 2 units every 3 days until CDE appointment.    Patient verbalized understanding.    Kary Linda MS, RD, LD, CDE

## 2021-04-08 ENCOUNTER — ALLIED HEALTH/NURSE VISIT (OUTPATIENT)
Dept: EDUCATION SERVICES | Facility: CLINIC | Age: 71
End: 2021-04-08
Attending: NURSE PRACTITIONER
Payer: COMMERCIAL

## 2021-04-08 DIAGNOSIS — E11.29 TYPE 2 DIABETES MELLITUS WITH OTHER DIABETIC KIDNEY COMPLICATION (H): ICD-10-CM

## 2021-04-08 DIAGNOSIS — E66.09 OBESITY DUE TO EXCESS CALORIES, UNSPECIFIED OBESITY SEVERITY: ICD-10-CM

## 2021-04-08 DIAGNOSIS — E11.9 DIABETES MELLITUS WITHOUT COMPLICATION (H): Primary | ICD-10-CM

## 2021-04-08 PROCEDURE — 99207 PR DROP WITH A PROCEDURE: CPT

## 2021-04-08 PROCEDURE — G0108 DIAB MANAGE TRN  PER INDIV: HCPCS

## 2021-04-08 RX ORDER — BLOOD SUGAR DIAGNOSTIC
STRIP MISCELLANEOUS
Qty: 200 STRIP | Refills: 11 | Status: SHIPPED | OUTPATIENT
Start: 2021-04-08 | End: 2021-08-06

## 2021-04-08 RX ORDER — FLASH GLUCOSE SENSOR
1 KIT MISCELLANEOUS CONTINUOUS
Qty: 6 EACH | Refills: 11 | Status: SHIPPED | OUTPATIENT
Start: 2021-04-08 | End: 2023-10-27 | Stop reason: ALTCHOICE

## 2021-04-08 RX ORDER — LANCETS 33 GAUGE
1 EACH MISCELLANEOUS 3 TIMES DAILY
Qty: 100 EACH | Refills: 11 | Status: SHIPPED | OUTPATIENT
Start: 2021-04-08

## 2021-04-08 RX ORDER — FLASH GLUCOSE SCANNING READER
1 EACH MISCELLANEOUS CONTINUOUS
Qty: 1 EACH | Refills: 0 | Status: SHIPPED | OUTPATIENT
Start: 2021-04-08 | End: 2023-10-26

## 2021-04-08 NOTE — LETTER
"    4/8/2021         RE: Cyndy Wang  [address]  [place] [address]        Dear Colleague,    Thank you for referring your patient, Cyndy Wang, to the [facility]. Please see a copy of my visit note below.    Diabetes Self-Management Education & Support    Presents for: Follow-up    SUBJECTIVE/OBJECTIVE:  Presents for: Follow-up  Accompanied by: Self  Diabetes education in the past 24mo: No  Focus of Visit: Healthy Eating  Diabetes type: Type 2  Disease course: Getting harder to manage  Transportation concerns: No  Difficulty affording diabetes medication?: Yes  Difficulty affording diabetes testing supplies?: Yes  Other concerns:: None  Cultural Influences/Ethnic Background:  American      Diabetes Symptoms & Complications:  Fatigue: Yes  Neuropathy: Yes  Polydipsia: Yes  Polyuria: Yes  Visual change: No  Weight trend: Increasing       Patient Problem List and Family Medical History reviewed for relevant medical history, current medical status, and diabetes risk factors.    Vitals:  There were no vitals taken for this visit.  Estimated body mass index is 34.21 kg/m  as calculated from the following:    Height as of 12/9/20: 1.727 m (5' 8\").    Weight as of 12/9/20: 102.1 kg (225 lb).   Last 3 BP:   BP Readings from Last 3 Encounters:   12/09/20 (!) 173/101   12/07/20 (!) 154/98   09/11/20 (!) 159/104       History   Smoking Status     Former Smoker     Types: Cigarettes   Smokeless Tobacco     Never Used     Comment: 1-2 cigarettes occasionally       Labs:  Lab Results   Component Value Date    A1C 12.0 03/04/2021     Lab Results   Component Value Date     03/04/2021     Lab Results   Component Value Date    LDL  01/08/2020     Cannot estimate LDL when triglyceride exceeds 400 mg/dL     01/08/2020     HDL Cholesterol   Date Value Ref Range Status   01/08/2020 38 (L) >49 mg/dL Final   ]  GFR Estimate   Date Value Ref Range Status   03/04/2021 44 (L) >60 " "mL/min/[1.73_m2] Final     Comment:     Non  GFR Calc  Starting 12/18/2018, serum creatinine based estimated GFR (eGFR) will be   calculated using the Chronic Kidney Disease Epidemiology Collaboration   (CKD-EPI) equation.       GFR Estimate If Black   Date Value Ref Range Status   03/04/2021 51 (L) >60 mL/min/[1.73_m2] Final     Comment:      GFR Calc  Starting 12/18/2018, serum creatinine based estimated GFR (eGFR) will be   calculated using the Chronic Kidney Disease Epidemiology Collaboration   (CKD-EPI) equation.       Lab Results   Component Value Date    CR 1.23 03/04/2021     No results found for: MICROALBUMIN    Healthy Eating:  Healthy Eating Assessed Today: Yes  Cultural/Advent diet restrictions?: No  Breakfast: Skips  Lunch: [employer] - chicken (works at [facility] [employer] [facility])  Dinner: chips + dip, candy  Beverages: Soda, Other, Water(\"used to drink a lot of pop and lemonade\")    Being Active:  Being Active Assessed Today: No  Exercise:: Currently not exercising    Monitoring:  Monitoring Assessed Today: Yes  Did patient bring glucose meter to appointment? : No  Blood Glucose Meter: One Touch  Times checking blood sugar at home (number): 2  Times checking blood sugar at home (per): Week  Blood glucose trend: Other    349 today, has not been checking    Taking Medications:  Diabetes Medication(s)     Insulin       insulin glargine (LANTUS SOLOSTAR) 100 UNIT/ML pen    Inject 18 Units Subcutaneous At Bedtime          Taking Medication Assessed Today: Yes  Problems taking diabetes medications regularly?: Yes(financial)  Diabetes medication side effects?: No    Problem Solving:  Problem Solving Assessed Today: No  Is the patient at risk for hypoglycemia?: Yes    Hypoglycemia symptoms  Feeling shaky: Yes         Reducing Risks:  Reducing Risks Assessed Today: No  Diabetes Risks: Age over 45 years, Hypertriglyceridemia, Sedentary Lifestyle, Family History  CAD Risks: Diabetes Mellitus, " Post-menopausal, Hypertension, Obesity, Family history, Sedentary lifestyle    Healthy Coping:  Healthy Coping Assessed Today: Yes  Emotional response to diabetes: Fear/Anxiety, Helplessness  Informal Support system:: Children, Family, Friends  Stage of change: PREPARATION (Decided to change - considering how)  Patient Activation Measure Survey Score:  EBONY Score (Last Two) 3/14/2012   EBONY Raw Score 39   Activation Score 56.4   EBONY Level 3       Diabetes knowledge and skills assessment:   Patient is knowledgeable in diabetes management concepts related to: none    Patient needs further education on the following diabetes management concepts: Healthy Eating, Being Active, Monitoring, Taking Medication and Healthy Coping    Based on learning assessment above, most appropriate setting for further diabetes education would be: Individual setting.      INTERVENTIONS:    Education provided today on:  AADE Self-Care Behaviors:  Diabetes Pathophysiology  Healthy Eating: carbohydrate counting, consistency in amount, composition, and timing of food intake, heart healthy diet, eating out, portion control, plate planning method and label reading  Being Active: relationship to blood glucose and describe appropriate activity program  Monitoring: purpose, proper technique, log and interpret results, individual blood glucose targets and frequency of monitoring  Taking Medication: action of prescribed medication, drawing up, administering and storing injectable diabetes medications, proper site selection and rotation for injections, side effects of prescribed medications and when to take medications  Problem Solving: high blood glucose - causes, signs/symptoms, treatment and prevention, low blood glucose - causes, signs/symptoms, treatment and prevention, carrying a carbohydrate source at all times, safe travel and when to call health care provider  Healthy Coping: recognize feelings about diagnosis, benefits of making appropriate  lifestyle changes, utilize support systems and methods for coping with stress  Patient was instructed on One Touch Verio Flex meter and was able to provide an accurate return demonstration. Patient's blood glucose reading today was 320 mg/dL.    Opportunities for ongoing education and support in diabetes-self management were discussed.    Pt verbalized understanding of concepts discussed and recommendations provided today.       Education Materials Provided:  [facility] Understanding Diabetes Booklet, Carbohydrate Counting, My Plate Planner and One Touch Verio Flex meter kit      ASSESSMENT:  Cyndy comes today very unsure of herself and in denial for having diabetes.  She was angry and upset , but willing to listen .  She was so happy that we care so much for her to keep working with her, she is now accepting she has diabetes, will begin taking her insulin and checking her BG, I ordered the Lynette freestyle per her request.  She plans to make lifestyle changes and has a friend for support.  Will keep working with Cyndy, and again, she is now ready to move forward.          Patient's most recent   Lab Results   Component Value Date    A1C 12.0 03/04/2021    is not meeting goal of <7.0    PLAN  See Patient Instructions for co-developed, patient-stated behavior change goals.  AVS printed and provided to patient today. See Follow-Up section for recommended follow-up.    Miroslava Ontiveros RN/[facility] Diabetes Educator    Time Spent: 60 minutes  Encounter Type: Individual    Any diabetes medication dose changes were made via the CDE Protocol and Collaborative Practice Agreement with the patient's primary care provider. A copy of this encounter was shared with the provider.

## 2021-04-08 NOTE — PATIENT INSTRUCTIONS
Diabetes Support Resources:  1. Glucerna daily and possible protein drink    2. Begin tonight at 18 units of Lantus, do this for 3 days, if your fasting BG is > 150, increase to 20 units at bedtime,  If after 3 more days your fasting BG > 150 go to 22 units of Lantus    3.send me a my chart message in 2 wks or sooner with any questions/concerns an update on your BG.    4.I am here to support you.           Bring blood glucose meter and logbook with you to all doctor and follow-up appointments.    Diabetes Education Telephone Visit Follow-up:    We realize your time is valuable and your health is important! We offer a convenient Telephone Visit follow up! It s a quick way to check in for a medication dose adjustment without having to come back to clinic as soon.    Telephone Visits are often covered by insurance. Please check with your insurance plan to see if this type of visit is covered. If not, the cost is less expensive than an office visit:      Up to 10 minutes (Code 39675): $30    11-20 minutes (Code 62163): $59    More than 20 minutes (Code 34488): $85    Talk with your Diabetes Educator if you want to learn more.      [facility]:  For Your Diabetes Education and Nutrition Appointments Call:  [phone]   For Diabetes Education or Nutrition Related Questions:   Phone: [phone]  Send [employer] Message   If you need a medication refill please contact your pharmacy. Please allow 3 business days for your refills to be completed.

## 2021-04-08 NOTE — PROGRESS NOTES
"Diabetes Self-Management Education & Support    Presents for: Follow-up    SUBJECTIVE/OBJECTIVE:  Presents for: Follow-up  Accompanied by: Self  Diabetes education in the past 24mo: No  Focus of Visit: Healthy Eating  Diabetes type: Type 2  Disease course: Getting harder to manage  Transportation concerns: No  Difficulty affording diabetes medication?: Yes  Difficulty affording diabetes testing supplies?: Yes  Other concerns:: None  Cultural Influences/Ethnic Background:  American      Diabetes Symptoms & Complications:  Fatigue: Yes  Neuropathy: Yes  Polydipsia: Yes  Polyuria: Yes  Visual change: No  Weight trend: Increasing       Patient Problem List and Family Medical History reviewed for relevant medical history, current medical status, and diabetes risk factors.    Vitals:  There were no vitals taken for this visit.  Estimated body mass index is 34.21 kg/m  as calculated from the following:    Height as of 12/9/20: 1.727 m (5' 8\").    Weight as of 12/9/20: 102.1 kg (225 lb).   Last 3 BP:   BP Readings from Last 3 Encounters:   12/09/20 (!) 173/101   12/07/20 (!) 154/98   09/11/20 (!) 159/104       History   Smoking Status     Former Smoker     Types: Cigarettes   Smokeless Tobacco     Never Used     Comment: 1-2 cigarettes occasionally       Labs:  Lab Results   Component Value Date    A1C 12.0 03/04/2021     Lab Results   Component Value Date     03/04/2021     Lab Results   Component Value Date    LDL  01/08/2020     Cannot estimate LDL when triglyceride exceeds 400 mg/dL     01/08/2020     HDL Cholesterol   Date Value Ref Range Status   01/08/2020 38 (L) >49 mg/dL Final   ]  GFR Estimate   Date Value Ref Range Status   03/04/2021 44 (L) >60 mL/min/[1.73_m2] Final     Comment:     Non  GFR Calc  Starting 12/18/2018, serum creatinine based estimated GFR (eGFR) will be   calculated using the Chronic Kidney Disease Epidemiology Collaboration   (CKD-EPI) equation.       GFR Estimate " "If Black   Date Value Ref Range Status   03/04/2021 51 (L) >60 mL/min/[1.73_m2] Final     Comment:      GFR Calc  Starting 12/18/2018, serum creatinine based estimated GFR (eGFR) will be   calculated using the Chronic Kidney Disease Epidemiology Collaboration   (CKD-EPI) equation.       Lab Results   Component Value Date    CR 1.23 03/04/2021     No results found for: MICROALBUMIN    Healthy Eating:  Healthy Eating Assessed Today: Yes  Cultural/Jehovah's witness diet restrictions?: No  Breakfast: Skips  Lunch: [employer] (works at [employer])  Dinner: chips + dip, candy  Beverages: Soda, Other, Water(\"used to drink a lot of pop and lemonade\")    Being Active:  Being Active Assessed Today: No  Exercise:: Currently not exercising    Monitoring:  Monitoring Assessed Today: Yes  Did patient bring glucose meter to appointment? : No  Blood Glucose Meter: One Touch  Times checking blood sugar at home (number): 2  Times checking blood sugar at home (per): Week  Blood glucose trend: Other    349 today, has not been checking    Taking Medications:  Diabetes Medication(s)     Insulin       insulin glargine (LANTUS SOLOSTAR) 100 UNIT/ML pen    Inject 18 Units Subcutaneous At Bedtime          Taking Medication Assessed Today: Yes  Problems taking diabetes medications regularly?: Yes(financial)  Diabetes medication side effects?: No    Problem Solving:  Problem Solving Assessed Today: No  Is the patient at risk for hypoglycemia?: Yes    Hypoglycemia symptoms  Feeling shaky: Yes         Reducing Risks:  Reducing Risks Assessed Today: No  Diabetes Risks: Age over 45 years, Hypertriglyceridemia, Sedentary Lifestyle, Family History  CAD Risks: Diabetes Mellitus, Post-menopausal, Hypertension, Obesity, Family history, Sedentary lifestyle    Healthy Coping:  Healthy Coping Assessed Today: Yes  Emotional response to diabetes: Fear/Anxiety, Helplessness  Informal Support system:: Children, Family, Friends  Stage of change: " PREPARATION (Decided to change - considering how)  Patient Activation Measure Survey Score:  EBONY Score (Last Two) 3/14/2012   EBONY Raw Score 39   Activation Score 56.4   EBONY Level 3       Diabetes knowledge and skills assessment:   Patient is knowledgeable in diabetes management concepts related to: none    Patient needs further education on the following diabetes management concepts: Healthy Eating, Being Active, Monitoring, Taking Medication and Healthy Coping    Based on learning assessment above, most appropriate setting for further diabetes education would be: Individual setting.      INTERVENTIONS:    Education provided today on:  AADE Self-Care Behaviors:  Diabetes Pathophysiology  Healthy Eating: carbohydrate counting, consistency in amount, composition, and timing of food intake, heart healthy diet, eating out, portion control, plate planning method and label reading  Being Active: relationship to blood glucose and describe appropriate activity program  Monitoring: purpose, proper technique, log and interpret results, individual blood glucose targets and frequency of monitoring  Taking Medication: action of prescribed medication, drawing up, administering and storing injectable diabetes medications, proper site selection and rotation for injections, side effects of prescribed medications and when to take medications  Problem Solving: high blood glucose - causes, signs/symptoms, treatment and prevention, low blood glucose - causes, signs/symptoms, treatment and prevention, carrying a carbohydrate source at all times, safe travel and when to call health care provider  Healthy Coping: recognize feelings about diagnosis, benefits of making appropriate lifestyle changes, utilize support systems and methods for coping with stress  Patient was instructed on One Touch Verio Flex meter and was able to provide an accurate return demonstration. Patient's blood glucose reading today was 320 mg/dL.    Opportunities for  ongoing education and support in diabetes-self management were discussed.    Pt verbalized understanding of concepts discussed and recommendations provided today.       Education Materials Provided:  [facility] Understanding Diabetes Booklet, Carbohydrate Counting, My Plate Planner and One Touch Verio Flex meter kit      ASSESSMENT:  Cyndy comes today very unsure of herself and in denial for having diabetes.  She was angry and upset , but willing to listen .  She was so happy that we care so much for her to keep working with her, she is now accepting she has diabetes, will begin taking her insulin and checking her BG, I ordered the Lynette freestyle per her request.  She plans to make lifestyle changes and has a friend for support.  Will keep working with Cyndy, and again, she is now ready to move forward.          Patient's most recent   Lab Results   Component Value Date    A1C 12.0 03/04/2021    is not meeting goal of <7.0    PLAN  See Patient Instructions for co-developed, patient-stated behavior change goals.  AVS printed and provided to patient today. See Follow-Up section for recommended follow-up.    Miroslava Ontiveros RN/[facility] Diabetes Educator    Time Spent: 60 minutes  Encounter Type: Individual    Any diabetes medication dose changes were made via the CDE Protocol and Collaborative Practice Agreement with the patient's primary care provider. A copy of this encounter was shared with the provider.

## 2021-04-09 ENCOUNTER — TELEPHONE (OUTPATIENT)
Dept: FAMILY MEDICINE | Facility: CLINIC | Age: 71
End: 2021-04-09

## 2021-04-09 DIAGNOSIS — E11.29 TYPE 2 DIABETES MELLITUS WITH OTHER DIABETIC KIDNEY COMPLICATION (H): ICD-10-CM

## 2021-04-09 RX ORDER — FLASH GLUCOSE SCANNING READER
1 EACH MISCELLANEOUS CONTINUOUS
Qty: 1 EACH | Refills: 0 | Status: CANCELLED | OUTPATIENT
Start: 2021-04-09

## 2021-04-09 NOTE — TELEPHONE ENCOUNTER
Prior Authorization required on Freestyle Lynette 2 [place] Device  Insurance Phone [phone]  Patient ID [id number]  Please contact the pharmacy with Prior Auth status (approved/denied)    Thank you  Ashwini Diggs  [facility]  [phone]

## 2021-04-11 NOTE — TELEPHONE ENCOUNTER
Prior Authorization Retail Medication Request    Medication/Dose: Freestyle Lynette 2 [place] Device  ICD code (if different than what is on RX):    Previously Tried and Failed:    Rationale:  Patient is testing 3 times daily and taking Lantus    Insurance Name:  [phone]  Insurance ID:  [id number]      Pharmacy Information (if different than what is on RX)  Name:    Phone:

## 2021-04-12 NOTE — TELEPHONE ENCOUNTER
PRIOR AUTHORIZATION DENIED    Medication: Freestyle Lynette 2 [place] Device    Denial Date: 4/12/2021    Denial Rationale: Patient must be insulin-treated with 3 or more daily injections of insulin or a Medicare-covered continuous subcutaneous insulin infusion pump.        Appeal Information: If provider would like to appeal this decision we will need a detailed letter of medical necessity to start the process. Then re-route this request back to the PA pool.

## 2021-04-12 NOTE — TELEPHONE ENCOUNTER
PA Initiation    Medication: Freestyle Lynette 2 [place] Device  Insurance Company: [employer] - Phone [phone] Fax [phone]  Pharmacy Filling the Rx: [facility] [place], [place] - [facility]  Filling Pharmacy Phone: [phone]  Filling Pharmacy Fax: [phone]  Start Date: 4/12/2021

## 2021-06-02 ENCOUNTER — ALLIED HEALTH/NURSE VISIT (OUTPATIENT)
Dept: EDUCATION SERVICES | Facility: CLINIC | Age: 71
End: 2021-06-02
Payer: COMMERCIAL

## 2021-06-02 DIAGNOSIS — E11.9 DIABETES MELLITUS WITHOUT COMPLICATION (H): ICD-10-CM

## 2021-06-02 DIAGNOSIS — E11.29 TYPE 2 DIABETES MELLITUS WITH OTHER DIABETIC KIDNEY COMPLICATION (H): Primary | ICD-10-CM

## 2021-06-02 PROCEDURE — G0108 DIAB MANAGE TRN  PER INDIV: HCPCS

## 2021-06-02 NOTE — PATIENT INSTRUCTIONS
Diabetes Support Resources:  1. Lantus 26 units with breakfast and 26 units with dinner    2. Add Trulicity 0.75 mg .      3. If you have any low BG please let me know.       Bring blood glucose meter and logbook with you to all doctor and follow-up appointments.    Diabetes Education Telephone Visit Follow-up:    We realize your time is valuable and your health is important! We offer a convenient Telephone Visit follow up! It s a quick way to check in for a medication dose adjustment without having to come back to clinic as soon.    Telephone Visits are often covered by insurance. Please check with your insurance plan to see if this type of visit is covered. If not, the cost is less expensive than an office visit:      Up to 10 minutes (Code 09071): $30    11-20 minutes (Code 50695): $59    More than 20 minutes (Code 69055): $85    Talk with your Diabetes Educator if you want to learn more.      [facility]:  For Your Diabetes Education and Nutrition Appointments Call:  [phone]   For Diabetes Education or Nutrition Related Questions:   Phone: [phone]  Send GoMoto Message   If you need a medication refill please contact your pharmacy. Please allow 3 business days for your refills to be completed.

## 2021-06-02 NOTE — LETTER
"    6/2/2021         RE: Cyndy Wang  [address]  [facility] [address]        Dear La, I am making progress with Cyndy and we are gettingalong well to be open to help her    Miroslava    Thank you for referring your patient, Cyndy Wang, to the [facility]. Please see a copy of my visit note below.    Diabetes Self-Management Education & Support    Presents for: Follow-up    SUBJECTIVE/OBJECTIVE:  Presents for: Follow-up  Accompanied by: Self  Diabetes education in the past 24mo: Yes  Focus of Visit: Healthy Eating, GLP-1 Start, Diabetes Pathophysiology, Taking Medication, Monitoring  GLP-1 Type: Trulicity  Diabetes type: Type 2  Disease course: Improving  Transportation concerns: No  Difficulty affording diabetes medication?: Yes  Difficulty affording diabetes testing supplies?: Yes  Other concerns:: None  Cultural Influences/Ethnic Background:  American      Diabetes Symptoms & Complications:  Fatigue: Sometimes  Neuropathy: Yes  Polydipsia: Yes  Polyuria: Yes  Visual change: No  Symptom course: Improving  Weight trend: Increasing       Patient Problem List and Family Medical History reviewed for relevant medical history, current medical status, and diabetes risk factors.    Vitals:  There were no vitals taken for this visit.  Estimated body mass index is 34.21 kg/m  as calculated from the following:    Height as of 12/9/20: 1.727 m (5' 8\").    Weight as of 12/9/20: 102.1 kg (225 lb).   Last 3 BP:   BP Readings from Last 3 Encounters:   12/09/20 (!) 173/101   12/07/20 (!) 154/98   09/11/20 (!) 159/104       History   Smoking Status     Former Smoker     Types: Cigarettes   Smokeless Tobacco     Never Used     Comment: 1-2 cigarettes occasionally       Labs:  Lab Results   Component Value Date    A1C 12.0 03/04/2021     Lab Results   Component Value Date     03/04/2021     Lab Results   Component Value Date    LDL  01/08/2020     Cannot estimate LDL when " "triglyceride exceeds 400 mg/dL     01/08/2020     HDL Cholesterol   Date Value Ref Range Status   01/08/2020 38 (L) >49 mg/dL Final   ]  GFR Estimate   Date Value Ref Range Status   03/04/2021 44 (L) >60 mL/min/[1.73_m2] Final     Comment:     Non  GFR Calc  Starting 12/18/2018, serum creatinine based estimated GFR (eGFR) will be   calculated using the Chronic Kidney Disease Epidemiology Collaboration   (CKD-EPI) equation.       GFR Estimate If Black   Date Value Ref Range Status   03/04/2021 51 (L) >60 mL/min/[1.73_m2] Final     Comment:      GFR Calc  Starting 12/18/2018, serum creatinine based estimated GFR (eGFR) will be   calculated using the Chronic Kidney Disease Epidemiology Collaboration   (CKD-EPI) equation.       Lab Results   Component Value Date    CR 1.23 03/04/2021     No results found for: MICROALBUMIN    Healthy Eating:  Healthy Eating Assessed Today: Yes  Cultural/Jewish diet restrictions?: No  Meal planning/habits: Smaller portions, Plate planning method  Breakfast: Skips, eats small amount now  Lunch: [employer] (works at [employer])  Dinner: chips + dip, candy, meat and potatos  Beverages: Soda, Other, Water(\"used to drink a lot of pop and lemonade\" has cut back somewhat)  Has patient met with a dietitian in the past?: No    Being Active:  Being Active Assessed Today: No  Exercise:: Currently not exercising    Monitoring:  Monitoring Assessed Today: Yes  Did patient bring glucose meter to appointment? : No  Blood Glucose Meter: One Touch  Times checking blood sugar at home (number): 2  Times checking blood sugar at home (per): Week  Blood glucose trend: Decreasing                      Taking Medications:  Diabetes Medication(s)     Insulin       insulin glargine (LANTUS SOLOSTAR) 100 UNIT/ML pen    Inject 26 Units Subcutaneous 2 times daily    Incretin Mimetic Agents (GLP-1 Receptor Agonists)       dulaglutide (TRULICITY) 0.75 MG/0.5ML pen    Inject 0.75 mg " Subcutaneous every 7 days          Taking Medication Assessed Today: Yes  Current Treatments: Insulin Injections, Diet  Dose schedule: Pre-breakfast, Pre-dinner  Given by: Patient  Injection/Infusion sites: Abdomen  Problems taking diabetes medications regularly?: No(financial)  Diabetes medication side effects?: No    Problem Solving:  Problem Solving Assessed Today: No  Is the patient at risk for hypoglycemia?: Yes  Hypoglycemia Frequency: Never    Hypoglycemia symptoms  Feeling shaky: Yes         Reducing Risks:  Reducing Risks Assessed Today: No  Diabetes Risks: Age over 45 years, Hypertriglyceridemia, Sedentary Lifestyle, Family History  CAD Risks: Diabetes Mellitus, Post-menopausal, Hypertension, Obesity, Family history, Sedentary lifestyle    Healthy Coping:  Healthy Coping Assessed Today: Yes  Emotional response to diabetes: Acceptance, Confidence diabetes can be controlled  Informal Support system:: Children, Family, Friends  Stage of change: ACTION (Actively working towards change)  Patient Activation Measure Survey Score:  EBONY Score (Last Two) 3/14/2012   EBONY Raw Score 39   Activation Score 56.4   EBONY Level 3       Diabetes knowledge and skills assessment:   Patient is knowledgeable in diabetes management concepts related to: Monitoring    Patient needs further education on the following diabetes management concepts: Healthy Eating, Being Active, Monitoring and Taking Medication    Based on learning assessment above, most appropriate setting for further diabetes education would be: Individual setting.      INTERVENTIONS:    Education provided today on:  AADE Self-Care Behaviors:  Healthy Eating: carbohydrate counting, consistency in amount, composition, and timing of food intake, heart healthy diet, portion control and plate planning method  Being Active: relationship to blood glucose and describe appropriate activity program  Monitoring: purpose, proper technique, log and interpret results, individual  blood glucose targets and frequency of monitoring  Taking Medication: action of prescribed medication, drawing up, administering and storing injectable diabetes medications, proper site selection and rotation for injections, side effects of prescribed medications and when to take medications  GLP-1 administration technique taught today. Patient verbalized understanding and was able to perform an accurate return demonstration of administration technique. Side effects were discussed, if patient has any abdominal pain, with or without nausea and/or vomiting, stop medication, call provider, clinic or go to the emergency room.    Opportunities for ongoing education and support in diabetes-self management were discussed.    Pt verbalized understanding of concepts discussed and recommendations provided today.       Education Materials Provided:  No new materials provided today      ASSESSMENT:  Cyndy came back today as promised.  She is taking to heart the fact she has diabetes and wants to be on top of things.  She was to increase her Lantus to 24 units once per day, she did not see the numbers come down so decided to take it twice per day 12 hrs apart on her own.  We did discuss an order should have been done first.   But after looking at her current #'s will increase her to take 26 units BID and add Trulicity to trial for free.,  Long term plan to put her on Glipizide once her numbers are down.        Patient's most recent   Lab Results   Component Value Date    A1C 12.0 03/04/2021    is not meeting goal of <7.0    PLAN  See Patient Instructions for co-developed, patient-stated behavior change goals.  AVS printed and provided to patient today. See Follow-Up section for recommended follow-up.    Miroslava Ontiveros RN/JES  [place] Diabetes Educator    Time Spent: 60 minutes  Encounter Type: Individual    Any diabetes medication dose changes were made via the CDE Protocol and Collaborative Practice Agreement with the patient's  primary care provider. A copy of this encounter was shared with the provider.

## 2021-06-02 NOTE — PROGRESS NOTES
"Diabetes Self-Management Education & Support    Presents for: Follow-up    SUBJECTIVE/OBJECTIVE:  Presents for: Follow-up  Accompanied by: Self  Diabetes education in the past 24mo: Yes  Focus of Visit: Healthy Eating, GLP-1 Start, Diabetes Pathophysiology, Taking Medication, Monitoring  GLP-1 Type: Trulicity  Diabetes type: Type 2  Disease course: Improving  Transportation concerns: No  Difficulty affording diabetes medication?: Yes  Difficulty affording diabetes testing supplies?: Yes  Other concerns:: None  Cultural Influences/Ethnic Background:  American      Diabetes Symptoms & Complications:  Fatigue: Sometimes  Neuropathy: Yes  Polydipsia: Yes  Polyuria: Yes  Visual change: No  Symptom course: Improving  Weight trend: Increasing       Patient Problem List and Family Medical History reviewed for relevant medical history, current medical status, and diabetes risk factors.    Vitals:  There were no vitals taken for this visit.  Estimated body mass index is 34.21 kg/m  as calculated from the following:    Height as of 12/9/20: 1.727 m (5' 8\").    Weight as of 12/9/20: 102.1 kg (225 lb).   Last 3 BP:   BP Readings from Last 3 Encounters:   12/09/20 (!) 173/101   12/07/20 (!) 154/98   09/11/20 (!) 159/104       History   Smoking Status     Former Smoker     Types: Cigarettes   Smokeless Tobacco     Never Used     Comment: 1-2 cigarettes occasionally       Labs:  Lab Results   Component Value Date    A1C 12.0 03/04/2021     Lab Results   Component Value Date     03/04/2021     Lab Results   Component Value Date    LDL  01/08/2020     Cannot estimate LDL when triglyceride exceeds 400 mg/dL     01/08/2020     HDL Cholesterol   Date Value Ref Range Status   01/08/2020 38 (L) >49 mg/dL Final   ]  GFR Estimate   Date Value Ref Range Status   03/04/2021 44 (L) >60 mL/min/[1.73_m2] Final     Comment:     Non  GFR Calc  Starting 12/18/2018, serum creatinine based estimated GFR (eGFR) will be " "  calculated using the Chronic Kidney Disease Epidemiology Collaboration   (CKD-EPI) equation.       GFR Estimate If Black   Date Value Ref Range Status   03/04/2021 51 (L) >60 mL/min/[1.73_m2] Final     Comment:      GFR Calc  Starting 12/18/2018, serum creatinine based estimated GFR (eGFR) will be   calculated using the Chronic Kidney Disease Epidemiology Collaboration   (CKD-EPI) equation.       Lab Results   Component Value Date    CR 1.23 03/04/2021     No results found for: MICROALBUMIN    Healthy Eating:  Healthy Eating Assessed Today: Yes  Cultural/Jewish diet restrictions?: No  Meal planning/habits: Smaller portions, Plate planning method  Breakfast: Skips, eats small amount now  Lunch: [employer] (works at [facility])  Dinner: chips + dip, candy, meat and potatos  Beverages: Soda, Other, Water(\"used to drink a lot of pop and lemonade\" has cut back somewhat)  Has patient met with a dietitian in the past?: No    Being Active:  Being Active Assessed Today: No  Exercise:: Currently not exercising    Monitoring:  Monitoring Assessed Today: Yes  Did patient bring glucose meter to appointment? : No  Blood Glucose Meter: One Touch  Times checking blood sugar at home (number): 2  Times checking blood sugar at home (per): Week  Blood glucose trend: Decreasing                      Taking Medications:  Diabetes Medication(s)     Insulin       insulin glargine (LANTUS SOLOSTAR) 100 UNIT/ML pen    Inject 26 Units Subcutaneous 2 times daily    Incretin Mimetic Agents (GLP-1 Receptor Agonists)       dulaglutide (TRULICITY) 0.75 MG/0.5ML pen    Inject 0.75 mg Subcutaneous every 7 days          Taking Medication Assessed Today: Yes  Current Treatments: Insulin Injections, Diet  Dose schedule: Pre-breakfast, Pre-dinner  Given by: Patient  Injection/Infusion sites: Abdomen  Problems taking diabetes medications regularly?: No(financial)  Diabetes medication side effects?: No    Problem Solving:  Problem Solving " Assessed Today: No  Is the patient at risk for hypoglycemia?: Yes  Hypoglycemia Frequency: Never    Hypoglycemia symptoms  Feeling shaky: Yes         Reducing Risks:  Reducing Risks Assessed Today: No  Diabetes Risks: Age over 45 years, Hypertriglyceridemia, Sedentary Lifestyle, Family History  CAD Risks: Diabetes Mellitus, Post-menopausal, Hypertension, Obesity, Family history, Sedentary lifestyle    Healthy Coping:  Healthy Coping Assessed Today: Yes  Emotional response to diabetes: Acceptance, Confidence diabetes can be controlled  Informal Support system:: Children, Family, Friends  Stage of change: ACTION (Actively working towards change)  Patient Activation Measure Survey Score:  EBONY Score (Last Two) 3/14/2012   EBONY Raw Score 39   Activation Score 56.4   EBONY Level 3       Diabetes knowledge and skills assessment:   Patient is knowledgeable in diabetes management concepts related to: Monitoring    Patient needs further education on the following diabetes management concepts: Healthy Eating, Being Active, Monitoring and Taking Medication    Based on learning assessment above, most appropriate setting for further diabetes education would be: Individual setting.      INTERVENTIONS:    Education provided today on:  AADE Self-Care Behaviors:  Healthy Eating: carbohydrate counting, consistency in amount, composition, and timing of food intake, heart healthy diet, portion control and plate planning method  Being Active: relationship to blood glucose and describe appropriate activity program  Monitoring: purpose, proper technique, log and interpret results, individual blood glucose targets and frequency of monitoring  Taking Medication: action of prescribed medication, drawing up, administering and storing injectable diabetes medications, proper site selection and rotation for injections, side effects of prescribed medications and when to take medications  GLP-1 administration technique taught today. Patient verbalized  understanding and was able to perform an accurate return demonstration of administration technique. Side effects were discussed, if patient has any abdominal pain, with or without nausea and/or vomiting, stop medication, call provider, clinic or go to the emergency room.    Opportunities for ongoing education and support in diabetes-self management were discussed.    Pt verbalized understanding of concepts discussed and recommendations provided today.       Education Materials Provided:  No new materials provided today      ASSESSMENT:  Cyndy came back today as promised.  She is taking to heart the fact she has diabetes and wants to be on top of things.  She was to increase her Lantus to 24 units once per day, she did not see the numbers come down so decided to take it twice per day 12 hrs apart on her own.  We did discuss an order should have been done first.   But after looking at her current #'s will increase her to take 26 units BID and add Trulicity to trial for free.,  Long term plan to put her on Glipizide once her numbers are down.        Patient's most recent   Lab Results   Component Value Date    A1C 12.0 03/04/2021    is not meeting goal of <7.0    PLAN  See Patient Instructions for co-developed, patient-stated behavior change goals.  AVS printed and provided to patient today. See Follow-Up section for recommended follow-up.    Miroslava Ontiveros RN/[facility] Diabetes Educator    Time Spent: 60 minutes  Encounter Type: Individual    Any diabetes medication dose changes were made via the CDE Protocol and Collaborative Practice Agreement with the patient's primary care provider. A copy of this encounter was shared with the provider.

## 2021-06-17 DIAGNOSIS — E11.29 TYPE 2 DIABETES MELLITUS WITH OTHER DIABETIC KIDNEY COMPLICATION (H): Primary | ICD-10-CM

## 2021-06-17 NOTE — PROGRESS NOTES
Patient due for diabetes visit, blood pressure follow up and labs.  Labs have been ordered.  Call patient and schedule.  SINDHU Gustafson

## 2021-06-18 NOTE — PROGRESS NOTES
LM for patient to return call - please assist patient in scheduling both lab appt and office visit when she returns call.     Amy Mack on 6/18/2021 at 3:07 PM

## 2021-10-02 ENCOUNTER — HEALTH MAINTENANCE LETTER (OUTPATIENT)
Age: 71
End: 2021-10-02

## 2021-12-17 ENCOUNTER — LAB REQUISITION (OUTPATIENT)
Dept: LAB | Facility: CLINIC | Age: 71
End: 2021-12-17
Payer: COMMERCIAL

## 2021-12-17 DIAGNOSIS — J96.01 ACUTE RESPIRATORY FAILURE WITH HYPOXIA (H): ICD-10-CM

## 2021-12-17 LAB
ANION GAP SERPL CALCULATED.3IONS-SCNC: 12 MMOL/L (ref 5–18)
BUN SERPL-MCNC: 16 MG/DL (ref 8–28)
CALCIUM SERPL-MCNC: 8.8 MG/DL (ref 8.5–10.5)
CHLORIDE BLD-SCNC: 99 MMOL/L (ref 98–107)
CO2 SERPL-SCNC: 24 MMOL/L (ref 22–31)
CREAT SERPL-MCNC: 0.81 MG/DL (ref 0.6–1.1)
ERYTHROCYTE [DISTWIDTH] IN BLOOD BY AUTOMATED COUNT: 16.7 % (ref 10–15)
GFR SERPL CREATININE-BSD FRML MDRD: 73 ML/MIN/1.73M2
GLUCOSE BLD-MCNC: 141 MG/DL (ref 70–125)
HCT VFR BLD AUTO: 33.8 % (ref 35–47)
HGB BLD-MCNC: 10.3 G/DL (ref 11.7–15.7)
MCH RBC QN AUTO: 28.5 PG (ref 26.5–33)
MCHC RBC AUTO-ENTMCNC: 30.5 G/DL (ref 31.5–36.5)
MCV RBC AUTO: 94 FL (ref 78–100)
PLATELET # BLD AUTO: 415 10E3/UL (ref 150–450)
POTASSIUM BLD-SCNC: 5 MMOL/L (ref 3.5–5)
RBC # BLD AUTO: 3.61 10E6/UL (ref 3.8–5.2)
SODIUM SERPL-SCNC: 135 MMOL/L (ref 136–145)
WBC # BLD AUTO: 7.9 10E3/UL (ref 4–11)

## 2021-12-17 PROCEDURE — 36415 COLL VENOUS BLD VENIPUNCTURE: CPT | Performed by: INTERNAL MEDICINE

## 2021-12-17 PROCEDURE — P9604 ONE-WAY ALLOW PRORATED TRIP: HCPCS | Mod: ORL | Performed by: INTERNAL MEDICINE

## 2021-12-17 PROCEDURE — 85027 COMPLETE CBC AUTOMATED: CPT | Mod: ORL | Performed by: INTERNAL MEDICINE

## 2021-12-17 PROCEDURE — 80048 BASIC METABOLIC PNL TOTAL CA: CPT | Mod: ORL | Performed by: INTERNAL MEDICINE

## 2022-02-04 ENCOUNTER — LAB REQUISITION (OUTPATIENT)
Dept: LAB | Facility: CLINIC | Age: 72
End: 2022-02-04
Payer: COMMERCIAL

## 2022-02-04 DIAGNOSIS — E11.9 TYPE 2 DIABETES MELLITUS WITHOUT COMPLICATIONS (H): ICD-10-CM

## 2022-02-07 LAB
ANION GAP SERPL CALCULATED.3IONS-SCNC: 7 MMOL/L (ref 5–18)
BUN SERPL-MCNC: 17 MG/DL (ref 8–28)
CALCIUM SERPL-MCNC: 9.6 MG/DL (ref 8.5–10.5)
CHLORIDE BLD-SCNC: 102 MMOL/L (ref 98–107)
CO2 SERPL-SCNC: 26 MMOL/L (ref 22–31)
CREAT SERPL-MCNC: 0.84 MG/DL (ref 0.6–1.1)
ERYTHROCYTE [DISTWIDTH] IN BLOOD BY AUTOMATED COUNT: 15 % (ref 10–15)
GFR SERPL CREATININE-BSD FRML MDRD: 73 ML/MIN/1.73M2
GLUCOSE BLD-MCNC: 130 MG/DL (ref 70–125)
HBA1C MFR BLD: 7.2 %
HCT VFR BLD AUTO: 33.8 % (ref 35–47)
HGB BLD-MCNC: 10.4 G/DL (ref 11.7–15.7)
MCH RBC QN AUTO: 25.7 PG (ref 26.5–33)
MCHC RBC AUTO-ENTMCNC: 30.8 G/DL (ref 31.5–36.5)
MCV RBC AUTO: 84 FL (ref 78–100)
PLATELET # BLD AUTO: 328 10E3/UL (ref 150–450)
POTASSIUM BLD-SCNC: 4.1 MMOL/L (ref 3.5–5)
RBC # BLD AUTO: 4.04 10E6/UL (ref 3.8–5.2)
SODIUM SERPL-SCNC: 135 MMOL/L (ref 136–145)
TSH SERPL DL<=0.005 MIU/L-ACNC: 2.46 UIU/ML (ref 0.3–5)
WBC # BLD AUTO: 7.1 10E3/UL (ref 4–11)

## 2022-02-07 PROCEDURE — 83036 HEMOGLOBIN GLYCOSYLATED A1C: CPT | Mod: ORL | Performed by: NURSE PRACTITIONER

## 2022-02-07 PROCEDURE — 85027 COMPLETE CBC AUTOMATED: CPT | Mod: ORL | Performed by: NURSE PRACTITIONER

## 2022-02-07 PROCEDURE — 84443 ASSAY THYROID STIM HORMONE: CPT | Mod: ORL | Performed by: NURSE PRACTITIONER

## 2022-02-07 PROCEDURE — 36415 COLL VENOUS BLD VENIPUNCTURE: CPT | Mod: ORL | Performed by: NURSE PRACTITIONER

## 2022-02-07 PROCEDURE — 80048 BASIC METABOLIC PNL TOTAL CA: CPT | Mod: ORL | Performed by: NURSE PRACTITIONER

## 2022-02-07 PROCEDURE — P9603 ONE-WAY ALLOW PRORATED MILES: HCPCS | Mod: ORL | Performed by: NURSE PRACTITIONER

## 2022-03-19 ENCOUNTER — HEALTH MAINTENANCE LETTER (OUTPATIENT)
Age: 72
End: 2022-03-19

## 2022-04-09 ENCOUNTER — LAB REQUISITION (OUTPATIENT)
Dept: LAB | Facility: CLINIC | Age: 72
End: 2022-04-09
Payer: COMMERCIAL

## 2022-04-09 DIAGNOSIS — U07.1 COVID-19: ICD-10-CM

## 2022-04-09 PROCEDURE — U0005 INFEC AGEN DETEC AMPLI PROBE: HCPCS | Mod: ORL | Performed by: NURSE PRACTITIONER

## 2022-04-10 LAB — SARS-COV-2 RNA RESP QL NAA+PROBE: NEGATIVE

## 2022-05-17 ENCOUNTER — LAB REQUISITION (OUTPATIENT)
Dept: LAB | Facility: CLINIC | Age: 72
End: 2022-05-17
Payer: COMMERCIAL

## 2022-05-17 DIAGNOSIS — Z11.52 ENCOUNTER FOR SCREENING FOR COVID-19: ICD-10-CM

## 2022-05-17 PROCEDURE — U0003 INFECTIOUS AGENT DETECTION BY NUCLEIC ACID (DNA OR RNA); SEVERE ACUTE RESPIRATORY SYNDROME CORONAVIRUS 2 (SARS-COV-2) (CORONAVIRUS DISEASE [COVID-19]), AMPLIFIED PROBE TECHNIQUE, MAKING USE OF HIGH THROUGHPUT TECHNOLOGIES AS DESCRIBED BY CMS-2020-01-R: HCPCS | Mod: ORL | Performed by: INTERNAL MEDICINE

## 2022-05-18 LAB — SARS-COV-2 RNA RESP QL NAA+PROBE: NEGATIVE

## 2022-07-12 PROCEDURE — 81001 URINALYSIS AUTO W/SCOPE: CPT | Mod: ORL | Performed by: INTERNAL MEDICINE

## 2022-07-12 PROCEDURE — 87086 URINE CULTURE/COLONY COUNT: CPT | Mod: ORL | Performed by: INTERNAL MEDICINE

## 2022-07-13 ENCOUNTER — LAB REQUISITION (OUTPATIENT)
Dept: LAB | Facility: CLINIC | Age: 72
End: 2022-07-13
Payer: COMMERCIAL

## 2022-07-13 DIAGNOSIS — R10.9 UNSPECIFIED ABDOMINAL PAIN: ICD-10-CM

## 2022-07-13 LAB
ALBUMIN UR-MCNC: 50 MG/DL
APPEARANCE UR: ABNORMAL
BACTERIA #/AREA URNS HPF: ABNORMAL /HPF
BILIRUB UR QL STRIP: NEGATIVE
COLOR UR AUTO: ABNORMAL
GLUCOSE UR STRIP-MCNC: NEGATIVE MG/DL
HGB UR QL STRIP: NEGATIVE
KETONES UR STRIP-MCNC: NEGATIVE MG/DL
LEUKOCYTE ESTERASE UR QL STRIP: ABNORMAL
MUCOUS THREADS #/AREA URNS LPF: PRESENT /LPF
NITRATE UR QL: NEGATIVE
PH UR STRIP: 6.5 [PH] (ref 5–7)
RBC URINE: 1 /HPF
SP GR UR STRIP: 1.01 (ref 1–1.03)
SQUAMOUS EPITHELIAL: 2 /HPF
TRANSITIONAL EPI: <1 /HPF
UROBILINOGEN UR STRIP-MCNC: NORMAL MG/DL
WBC URINE: 77 /HPF

## 2022-07-18 LAB
BACTERIA UR CULT: ABNORMAL
BACTERIA UR CULT: ABNORMAL

## 2022-09-03 ENCOUNTER — HEALTH MAINTENANCE LETTER (OUTPATIENT)
Age: 72
End: 2022-09-03

## 2022-09-26 ENCOUNTER — LAB REQUISITION (OUTPATIENT)
Dept: LAB | Facility: CLINIC | Age: 72
End: 2022-09-26
Payer: COMMERCIAL

## 2022-09-26 ENCOUNTER — LAB REQUISITION (OUTPATIENT)
Dept: LAB | Facility: CLINIC | Age: 72
End: 2022-09-26

## 2022-09-26 DIAGNOSIS — R30.0 DYSURIA: ICD-10-CM

## 2022-09-26 LAB
ALBUMIN UR-MCNC: 100 MG/DL
APPEARANCE UR: ABNORMAL
BACTERIA #/AREA URNS HPF: ABNORMAL /HPF
BILIRUB UR QL STRIP: NEGATIVE
COLOR UR AUTO: ABNORMAL
GLUCOSE UR STRIP-MCNC: >=1000 MG/DL
HGB UR QL STRIP: ABNORMAL
KETONES UR STRIP-MCNC: NEGATIVE MG/DL
LEUKOCYTE ESTERASE UR QL STRIP: ABNORMAL
NITRATE UR QL: NEGATIVE
PH UR STRIP: 5.5 [PH] (ref 5–7)
RBC URINE: 3 /HPF
SP GR UR STRIP: 1.02 (ref 1–1.03)
SQUAMOUS EPITHELIAL: 1 /HPF
UROBILINOGEN UR STRIP-MCNC: NORMAL MG/DL
WBC CLUMPS #/AREA URNS HPF: PRESENT /HPF
WBC URINE: 43 /HPF

## 2022-09-26 PROCEDURE — 87086 URINE CULTURE/COLONY COUNT: CPT | Performed by: NURSE PRACTITIONER

## 2022-09-26 PROCEDURE — 81001 URINALYSIS AUTO W/SCOPE: CPT | Mod: ORL | Performed by: NURSE PRACTITIONER

## 2022-09-28 LAB — BACTERIA UR CULT: NORMAL

## 2022-10-06 ENCOUNTER — LAB REQUISITION (OUTPATIENT)
Dept: LAB | Facility: CLINIC | Age: 72
End: 2022-10-06
Payer: COMMERCIAL

## 2022-10-06 DIAGNOSIS — R30.0 DYSURIA: ICD-10-CM

## 2022-10-06 LAB
ALBUMIN UR-MCNC: 70 MG/DL
APPEARANCE UR: ABNORMAL
BACTERIA #/AREA URNS HPF: ABNORMAL /HPF
BILIRUB UR QL STRIP: NEGATIVE
COLOR UR AUTO: ABNORMAL
GLUCOSE UR STRIP-MCNC: >=1000 MG/DL
HGB UR QL STRIP: NEGATIVE
KETONES UR STRIP-MCNC: NEGATIVE MG/DL
LEUKOCYTE ESTERASE UR QL STRIP: ABNORMAL
MUCOUS THREADS #/AREA URNS LPF: PRESENT /LPF
NITRATE UR QL: NEGATIVE
PH UR STRIP: 5.5 [PH] (ref 5–7)
RBC URINE: 1 /HPF
SP GR UR STRIP: 1.01 (ref 1–1.03)
SQUAMOUS EPITHELIAL: 1 /HPF
UROBILINOGEN UR STRIP-MCNC: NORMAL MG/DL
WBC URINE: 38 /HPF

## 2022-10-06 PROCEDURE — 87086 URINE CULTURE/COLONY COUNT: CPT | Mod: ORL | Performed by: INTERNAL MEDICINE

## 2022-10-06 PROCEDURE — 81001 URINALYSIS AUTO W/SCOPE: CPT | Mod: ORL | Performed by: INTERNAL MEDICINE

## 2022-10-07 ENCOUNTER — LAB REQUISITION (OUTPATIENT)
Dept: LAB | Facility: CLINIC | Age: 72
End: 2022-10-07
Payer: COMMERCIAL

## 2022-10-07 DIAGNOSIS — N39.0 URINARY TRACT INFECTION, SITE NOT SPECIFIED: ICD-10-CM

## 2022-10-08 LAB — BACTERIA UR CULT: ABNORMAL

## 2022-10-19 ENCOUNTER — LAB REQUISITION (OUTPATIENT)
Dept: LAB | Facility: CLINIC | Age: 72
End: 2022-10-19
Payer: COMMERCIAL

## 2022-10-19 DIAGNOSIS — E03.2 HYPOTHYROIDISM DUE TO MEDICAMENTS AND OTHER EXOGENOUS SUBSTANCES: ICD-10-CM

## 2022-10-20 LAB
ANION GAP SERPL CALCULATED.3IONS-SCNC: 17 MMOL/L (ref 7–15)
BASOPHILS # BLD AUTO: 0 10E3/UL (ref 0–0.2)
BASOPHILS NFR BLD AUTO: 0 %
BUN SERPL-MCNC: 22.3 MG/DL (ref 8–23)
CALCIUM SERPL-MCNC: 10 MG/DL (ref 8.8–10.2)
CHLORIDE SERPL-SCNC: 94 MMOL/L (ref 98–107)
CREAT SERPL-MCNC: 1.08 MG/DL (ref 0.51–0.95)
DEPRECATED HCO3 PLAS-SCNC: 21 MMOL/L (ref 22–29)
EOSINOPHIL # BLD AUTO: 0 10E3/UL (ref 0–0.7)
EOSINOPHIL NFR BLD AUTO: 0 %
ERYTHROCYTE [DISTWIDTH] IN BLOOD BY AUTOMATED COUNT: 14.1 % (ref 10–15)
GFR SERPL CREATININE-BSD FRML MDRD: 54 ML/MIN/1.73M2
GLUCOSE SERPL-MCNC: 246 MG/DL (ref 70–99)
HBA1C MFR BLD: 10.9 %
HCT VFR BLD AUTO: 44.8 % (ref 35–47)
HGB BLD-MCNC: 14.7 G/DL (ref 11.7–15.7)
IMM GRANULOCYTES # BLD: 0 10E3/UL
IMM GRANULOCYTES NFR BLD: 0 %
LYMPHOCYTES # BLD AUTO: 1.2 10E3/UL (ref 0.8–5.3)
LYMPHOCYTES NFR BLD AUTO: 13 %
MCH RBC QN AUTO: 27.6 PG (ref 26.5–33)
MCHC RBC AUTO-ENTMCNC: 32.8 G/DL (ref 31.5–36.5)
MCV RBC AUTO: 84 FL (ref 78–100)
MONOCYTES # BLD AUTO: 0.6 10E3/UL (ref 0–1.3)
MONOCYTES NFR BLD AUTO: 7 %
NEUTROPHILS # BLD AUTO: 7.1 10E3/UL (ref 1.6–8.3)
NEUTROPHILS NFR BLD AUTO: 80 %
NRBC # BLD AUTO: 0 10E3/UL
NRBC BLD AUTO-RTO: 0 /100
PLATELET # BLD AUTO: 261 10E3/UL (ref 150–450)
POTASSIUM SERPL-SCNC: 5.1 MMOL/L (ref 3.4–5.3)
RBC # BLD AUTO: 5.32 10E6/UL (ref 3.8–5.2)
SODIUM SERPL-SCNC: 132 MMOL/L (ref 136–145)
TSH SERPL DL<=0.005 MIU/L-ACNC: 5.01 UIU/ML (ref 0.3–4.2)
WBC # BLD AUTO: 8.9 10E3/UL (ref 4–11)

## 2022-10-20 PROCEDURE — 80048 BASIC METABOLIC PNL TOTAL CA: CPT | Mod: ORL | Performed by: NURSE PRACTITIONER

## 2022-10-20 PROCEDURE — 85025 COMPLETE CBC W/AUTO DIFF WBC: CPT | Mod: ORL | Performed by: NURSE PRACTITIONER

## 2022-10-20 PROCEDURE — 83036 HEMOGLOBIN GLYCOSYLATED A1C: CPT | Mod: ORL | Performed by: NURSE PRACTITIONER

## 2022-10-20 PROCEDURE — 36415 COLL VENOUS BLD VENIPUNCTURE: CPT | Mod: ORL | Performed by: NURSE PRACTITIONER

## 2022-10-20 PROCEDURE — P9604 ONE-WAY ALLOW PRORATED TRIP: HCPCS | Mod: ORL | Performed by: NURSE PRACTITIONER

## 2022-10-20 PROCEDURE — 84443 ASSAY THYROID STIM HORMONE: CPT | Mod: ORL | Performed by: NURSE PRACTITIONER

## 2022-12-04 ENCOUNTER — LAB REQUISITION (OUTPATIENT)
Dept: LAB | Facility: CLINIC | Age: 72
End: 2022-12-04
Payer: COMMERCIAL

## 2022-12-04 DIAGNOSIS — E03.2 HYPOTHYROIDISM DUE TO MEDICAMENTS AND OTHER EXOGENOUS SUBSTANCES: ICD-10-CM

## 2022-12-05 ENCOUNTER — LAB REQUISITION (OUTPATIENT)
Dept: LAB | Facility: CLINIC | Age: 72
End: 2022-12-05
Payer: COMMERCIAL

## 2022-12-05 DIAGNOSIS — E03.9 HYPOTHYROIDISM, UNSPECIFIED: ICD-10-CM

## 2022-12-06 LAB — TSH SERPL DL<=0.005 MIU/L-ACNC: 2.42 UIU/ML (ref 0.3–4.2)

## 2022-12-06 PROCEDURE — P9604 ONE-WAY ALLOW PRORATED TRIP: HCPCS | Mod: ORL | Performed by: NURSE PRACTITIONER

## 2022-12-06 PROCEDURE — 36415 COLL VENOUS BLD VENIPUNCTURE: CPT | Mod: ORL | Performed by: NURSE PRACTITIONER

## 2022-12-06 PROCEDURE — 84443 ASSAY THYROID STIM HORMONE: CPT | Mod: ORL | Performed by: NURSE PRACTITIONER

## 2023-01-06 ENCOUNTER — TELEPHONE (OUTPATIENT)
Dept: PHARMACY | Facility: CLINIC | Age: 73
End: 2023-01-06

## 2023-01-06 NOTE — TELEPHONE ENCOUNTER
Called to offer an MTM appointment. Primary phone number listed did not work. Second phone number was a wrong number per the person who answered.      Nicole Shay, PharmD, [facility]  Medication Therapy Management Pharmacist

## 2023-01-15 ENCOUNTER — HEALTH MAINTENANCE LETTER (OUTPATIENT)
Age: 73
End: 2023-01-15

## 2023-01-25 ENCOUNTER — LAB REQUISITION (OUTPATIENT)
Dept: LAB | Facility: CLINIC | Age: 73
End: 2023-01-25
Payer: COMMERCIAL

## 2023-01-25 DIAGNOSIS — E11.9 TYPE 2 DIABETES MELLITUS WITHOUT COMPLICATIONS (H): ICD-10-CM

## 2023-01-26 LAB
ALBUMIN SERPL BCG-MCNC: 3.4 G/DL (ref 3.5–5.2)
ALP SERPL-CCNC: 230 U/L (ref 35–104)
ALT SERPL W P-5'-P-CCNC: 54 U/L (ref 10–35)
ANION GAP SERPL CALCULATED.3IONS-SCNC: 10 MMOL/L (ref 7–15)
AST SERPL W P-5'-P-CCNC: 60 U/L (ref 10–35)
BILIRUB SERPL-MCNC: 0.3 MG/DL
BUN SERPL-MCNC: 28.9 MG/DL (ref 8–23)
CALCIUM SERPL-MCNC: 9.2 MG/DL (ref 8.8–10.2)
CHLORIDE SERPL-SCNC: 98 MMOL/L (ref 98–107)
CHOLEST SERPL-MCNC: 214 MG/DL
CREAT SERPL-MCNC: 1.09 MG/DL (ref 0.51–0.95)
DEPRECATED HCO3 PLAS-SCNC: 26 MMOL/L (ref 22–29)
GFR SERPL CREATININE-BSD FRML MDRD: 53 ML/MIN/1.73M2
GLUCOSE SERPL-MCNC: 284 MG/DL (ref 70–99)
HBA1C MFR BLD: 10.6 %
HDLC SERPL-MCNC: 30 MG/DL
LDLC SERPL CALC-MCNC: 121 MG/DL
NONHDLC SERPL-MCNC: 184 MG/DL
POTASSIUM SERPL-SCNC: 5 MMOL/L (ref 3.4–5.3)
PROT SERPL-MCNC: 7.2 G/DL (ref 6.4–8.3)
SODIUM SERPL-SCNC: 134 MMOL/L (ref 136–145)
TRIGL SERPL-MCNC: 313 MG/DL

## 2023-01-26 PROCEDURE — P9604 ONE-WAY ALLOW PRORATED TRIP: HCPCS | Mod: ORL | Performed by: INTERNAL MEDICINE

## 2023-01-26 PROCEDURE — 83036 HEMOGLOBIN GLYCOSYLATED A1C: CPT | Mod: ORL | Performed by: INTERNAL MEDICINE

## 2023-01-26 PROCEDURE — 80053 COMPREHEN METABOLIC PANEL: CPT | Mod: ORL | Performed by: INTERNAL MEDICINE

## 2023-01-26 PROCEDURE — 36415 COLL VENOUS BLD VENIPUNCTURE: CPT | Mod: ORL | Performed by: INTERNAL MEDICINE

## 2023-01-26 PROCEDURE — 80061 LIPID PANEL: CPT | Mod: ORL | Performed by: INTERNAL MEDICINE

## 2023-02-02 NOTE — TELEPHONE ENCOUNTER
RECORDS RECEIVED FROM: internal /ce    DATE RECEIVED: 4.27.23    NOTES (FOR ALL VISITS) STATUS DETAILS   OFFICE NOTES from referring provider internal  La Anderson NP   OFFICE NOTES from other specialist internal  1.25.23 [place]      MEDICATION LIST internal     IMAGING        XR (Chest) ce [facility]- 1.18.21   CT (HEAD/NECK/CHEST/ABDOMEN) ce [facility]- 11.28.21, 11/18/21     LABS     DIABETES: HBGA1C, CREATININE, FASTING LIPIDS, MICROALBUMIN URINE, POTASSIUM, TSH, T4    THYROID: TSH, T4, CBC, THYRODLONULIN, TOTAL T3, FREE T4, CALCITONIN, CEA internal /ce HBGA1C- 1.26.23  Lipid- 1.26.23  CMP- 1.26.23  TSH- 12.6.22  BMP- 10.20.22

## 2023-04-24 NOTE — PROGRESS NOTES
Patient is showing 3/5 MNCM met. A1c not in range   Fabienne Del Real, VF   Outcome for 04/24/23 3:08 PM :Sent patient [employer] message asking them to upload their BG data   Fabienne Del Real  Outcome for 04/25/23 3:06 PM :Unable to Leave    Fabienne Del Real

## 2023-04-25 ENCOUNTER — TELEPHONE (OUTPATIENT)
Dept: ENDOCRINOLOGY | Facility: CLINIC | Age: 73
End: 2023-04-25
Payer: COMMERCIAL

## 2023-04-27 ENCOUNTER — OFFICE VISIT (OUTPATIENT)
Dept: ENDOCRINOLOGY | Facility: CLINIC | Age: 73
End: 2023-04-27
Payer: COMMERCIAL

## 2023-04-27 ENCOUNTER — PRE VISIT (OUTPATIENT)
Dept: ENDOCRINOLOGY | Facility: CLINIC | Age: 73
End: 2023-04-27

## 2023-04-27 ENCOUNTER — LAB (OUTPATIENT)
Dept: LAB | Facility: CLINIC | Age: 73
End: 2023-04-27
Payer: COMMERCIAL

## 2023-04-27 VITALS
OXYGEN SATURATION: 94 % | DIASTOLIC BLOOD PRESSURE: 83 MMHG | HEART RATE: 89 BPM | SYSTOLIC BLOOD PRESSURE: 138 MMHG | WEIGHT: 246 LBS | BODY MASS INDEX: 37.4 KG/M2

## 2023-04-27 DIAGNOSIS — E11.65 TYPE 2 DIABETES MELLITUS WITH HYPERGLYCEMIA, WITH LONG-TERM CURRENT USE OF INSULIN (H): Primary | ICD-10-CM

## 2023-04-27 DIAGNOSIS — E11.65 TYPE 2 DIABETES MELLITUS WITH HYPERGLYCEMIA, WITH LONG-TERM CURRENT USE OF INSULIN (H): ICD-10-CM

## 2023-04-27 DIAGNOSIS — E11.29 TYPE 2 DIABETES MELLITUS WITH OTHER DIABETIC KIDNEY COMPLICATION (H): ICD-10-CM

## 2023-04-27 DIAGNOSIS — E03.9 HYPOTHYROIDISM, UNSPECIFIED TYPE: ICD-10-CM

## 2023-04-27 DIAGNOSIS — Z79.4 TYPE 2 DIABETES MELLITUS WITH HYPERGLYCEMIA, WITH LONG-TERM CURRENT USE OF INSULIN (H): Primary | ICD-10-CM

## 2023-04-27 DIAGNOSIS — Z79.4 TYPE 2 DIABETES MELLITUS WITH HYPERGLYCEMIA, WITH LONG-TERM CURRENT USE OF INSULIN (H): ICD-10-CM

## 2023-04-27 LAB
FASTING STATUS PATIENT QL REPORTED: ABNORMAL
GLUCOSE SERPL-MCNC: 319 MG/DL (ref 70–99)
HBA1C MFR BLD: 11.8 % (ref 4.3–?)
T4 FREE SERPL-MCNC: 1.35 NG/DL (ref 0.9–1.7)
TSH SERPL DL<=0.005 MIU/L-ACNC: 10.25 UIU/ML (ref 0.3–4.2)

## 2023-04-27 PROCEDURE — 82570 ASSAY OF URINE CREATININE: CPT | Mod: 90 | Performed by: PATHOLOGY

## 2023-04-27 PROCEDURE — 99205 OFFICE O/P NEW HI 60 MIN: CPT | Mod: GC | Performed by: STUDENT IN AN ORGANIZED HEALTH CARE EDUCATION/TRAINING PROGRAM

## 2023-04-27 PROCEDURE — 82043 UR ALBUMIN QUANTITATIVE: CPT | Mod: 90 | Performed by: PATHOLOGY

## 2023-04-27 PROCEDURE — 36415 COLL VENOUS BLD VENIPUNCTURE: CPT | Performed by: PATHOLOGY

## 2023-04-27 PROCEDURE — 84439 ASSAY OF FREE THYROXINE: CPT | Performed by: PATHOLOGY

## 2023-04-27 PROCEDURE — 82947 ASSAY GLUCOSE BLOOD QUANT: CPT | Performed by: PATHOLOGY

## 2023-04-27 PROCEDURE — 83036 HEMOGLOBIN GLYCOSYLATED A1C: CPT | Performed by: STUDENT IN AN ORGANIZED HEALTH CARE EDUCATION/TRAINING PROGRAM

## 2023-04-27 PROCEDURE — 84443 ASSAY THYROID STIM HORMONE: CPT | Performed by: PATHOLOGY

## 2023-04-27 PROCEDURE — 84681 ASSAY OF C-PEPTIDE: CPT | Mod: 90 | Performed by: PATHOLOGY

## 2023-04-27 PROCEDURE — 99000 SPECIMEN HANDLING OFFICE-LAB: CPT | Performed by: PATHOLOGY

## 2023-04-27 RX ORDER — INSULIN GLARGINE 100 [IU]/ML
48 INJECTION, SOLUTION SUBCUTANEOUS 2 TIMES DAILY
Qty: 30 ML | Refills: 11 | Status: SHIPPED | OUTPATIENT
Start: 2023-04-27 | End: 2023-07-20

## 2023-04-27 RX ORDER — NYSTATIN 100000 [USP'U]/G
POWDER TOPICAL
COMMUNITY
Start: 2023-03-30

## 2023-04-27 RX ORDER — PANTOPRAZOLE SODIUM 40 MG/1
TABLET, DELAYED RELEASE ORAL
COMMUNITY
Start: 2022-09-10 | End: 2023-04-27

## 2023-04-27 RX ORDER — INSULIN ASPART 100 [IU]/ML
INJECTION, SOLUTION INTRAVENOUS; SUBCUTANEOUS
Qty: 30 ML | Refills: 11 | Status: SHIPPED | OUTPATIENT
Start: 2023-04-27

## 2023-04-27 RX ORDER — PANTOPRAZOLE SODIUM 20 MG/1
TABLET, DELAYED RELEASE ORAL
COMMUNITY
Start: 2023-04-03

## 2023-04-27 RX ORDER — PRAVASTATIN SODIUM 20 MG
20 TABLET ORAL DAILY
Qty: 90 TABLET | Refills: 3 | Status: SHIPPED | OUTPATIENT
Start: 2023-04-27

## 2023-04-27 RX ORDER — IPRATROPIUM BROMIDE AND ALBUTEROL SULFATE 2.5; .5 MG/3ML; MG/3ML
SOLUTION RESPIRATORY (INHALATION)
COMMUNITY
Start: 2022-07-22

## 2023-04-27 RX ORDER — LEVOTHYROXINE SODIUM 200 UG/1
TABLET ORAL
COMMUNITY
Start: 2023-04-23 | End: 2023-07-20

## 2023-04-27 RX ORDER — ACETAMINOPHEN 500 MG
500-1000 TABLET ORAL EVERY 6 HOURS PRN
COMMUNITY

## 2023-04-27 RX ORDER — CIPROFLOXACIN 500 MG/1
TABLET, FILM COATED ORAL
COMMUNITY
Start: 2022-10-10

## 2023-04-27 RX ORDER — INSULIN ASPART 100 [IU]/ML
INJECTION, SOLUTION INTRAVENOUS; SUBCUTANEOUS
COMMUNITY
Start: 2022-06-14

## 2023-04-27 RX ORDER — CIPROFLOXACIN 250 MG/1
TABLET, FILM COATED ORAL
COMMUNITY
Start: 2022-10-10

## 2023-04-27 RX ORDER — NITROFURANTOIN 25; 75 MG/1; MG/1
CAPSULE ORAL
COMMUNITY
Start: 2022-09-30

## 2023-04-27 RX ORDER — ASPIRIN 81 MG/1
TABLET, COATED ORAL
COMMUNITY
Start: 2023-04-11

## 2023-04-27 RX ORDER — METOPROLOL TARTRATE 25 MG/1
TABLET, FILM COATED ORAL
COMMUNITY
Start: 2023-04-05

## 2023-04-27 RX ORDER — MORPHINE SULFATE 20 MG/ML
SOLUTION ORAL
COMMUNITY
Start: 2022-10-18

## 2023-04-27 RX ORDER — CHLORHEXIDINE GLUCONATE ORAL RINSE 1.2 MG/ML
15 SOLUTION DENTAL 2 TIMES DAILY
COMMUNITY
Start: 2023-03-06

## 2023-04-27 RX ORDER — INSULIN ASPART 100 [IU]/ML
INJECTION, SOLUTION INTRAVENOUS; SUBCUTANEOUS
COMMUNITY
Start: 2023-04-20

## 2023-04-27 RX ORDER — FLUTICASONE PROPIONATE 50 MCG
SPRAY, SUSPENSION (ML) NASAL
COMMUNITY
Start: 2023-02-20

## 2023-04-27 RX ORDER — GLIMEPIRIDE 1 MG/1
TABLET ORAL
COMMUNITY
Start: 2022-10-19 | End: 2023-04-27

## 2023-04-27 ASSESSMENT — PAIN SCALES - GENERAL: PAINLEVEL: SEVERE PAIN (7)

## 2023-04-27 NOTE — PROGRESS NOTES
Endocrinology Clinic New Consult      Cyndy Wang MRN:[id number] YOB: 1950  Primary care provider: La Anderson     Reason for Endocrine consult:  T2DM w/ hyperglycemia    HPI:  Cyndy Wang is a 73 year old female with T2DM, Obesity BMI: 34.1,neuropathy, HLD, microalbuminuria who resides in a nursing home.    No nausea, vomiting,abdominal pain, has pain in her lower extremities or blurring of vision  She was previously tried on metformin multiple times - nausea, vomiting  She doesn't recollect using trulicity before, it was given as a sample in 10/2021  Has not had any low BG recently    Current DM medications:  lantus 48 units bid  novolog 8 units with each meal  Novolog 2/50 above 150 mg/dl with meals  Nurse administers it in the nursing home, she has been there for last 2 years    Bg log: reviewed from her NH  Fasting ranged between 284- 299  Pre lunch: 340-415  Pre dinner: 364-453  Bedtime 378    Reports no low BG recently  Diet: eats 3 meals a day, snacks very rarely  Uses wheel chair most of the time    Diabetes complications include:  Retinopathy: last eye exam was in NH 7 months ago,no PDR as reported by patient   Nephropathy:creat: 1.05 in 2023, has micro albuminuria  Neuropathy: yes in her feet  LDL: 121, was previously on pravastatin 20 mg daily, not on her medication list at this point  ASA: yes  Smoking: none  BP:well controlled     FH: No family history of diabetes, brother with pancreatic cancer    Sh: no tobacco use or alcohol use. Has 1 kid, . Lives at nursing home.    She gets Lt4 200 mcg daily, complaint, takes it early Am. She mentions she gained 40 lbs , she attributes it to eating    ROS:  All 12 systems were reviewed and negative except as mentioned in HPI    Past Medical/Surgical History:  Past Medical History:   Diagnosis Date     Diabetes (H)      Hypertension      Obese      Thyroid disease      Type 2 diabetes mellitus with other diabetic kidney  complication (H) 10/24/2015     Past Surgical History:   Procedure Laterality Date     REPAIR TENDON FINGER(S) Right 5/22/2015    Procedure: REPAIR TENDON FINGER(S);  Surgeon: Xavier Paulson MD;  Location: [facility]       Allergies:  Allergies   Allergen Reactions     Sulfa Antibiotics Difficulty breathing     Lisinopril Cough     Pcn [Penicillins] Difficulty breathing       PTA Meds:  Prior to Admission medications    Medication Sig Last Dose Taking? Auth Provider Long Term End Date   ammonium lactate (LAC-HYDRIN) 12 % external cream Apply topically 2 times daily as needed for dry skin   Red Robles, NIKO     aspirin (ASA) 81 MG chewable tablet Take 1 tablet (81 mg) by mouth daily   La Anderson NP     blood glucose (NO BRAND SPECIFIED) lancets standard 1 each by In Vitro route 3 times daily Use to test blood sugar daily whatever brand her insurance will pay for   La Anderson NP     blood glucose (NO BRAND SPECIFIED) test strip 1 strip by In Vitro route 3 times daily Use to test blood sugars daily. What ever her insurance will pay for   La Anderson NP     blood glucose monitoring (NO BRAND SPECIFIED) meter device kit by In Vitro route 3 times daily Use to test blood sugar daily. What ever her insurance will pay for   La Anderson NP     Continuous Blood Gluc  (FREESTYLE RICH 14 DAY READER) MAGDALENO 1 Device continuous To use per manufactures directions   La Anderson NP     Continuous Blood Gluc Sensor (FREESTYLE RICH 14 DAY SENSOR) MISC 1 Box continuous To change out every 14 days   La Anderson NP     dulaglutide (TRULICITY) 0.75 MG/0.5ML pen Inject 0.75 mg Subcutaneous every 7 days   La Anderson NP     insulin glargine (LANTUS SOLOSTAR) 100 UNIT/ML pen Inject 26 Units Subcutaneous 2 times daily   La Anderson NP Yes    insulin pen needle (31G X 5 MM) 31G X 5 MM miscellaneous Use 2 pen needles daily or as directed.    La Anderson NP     levothyroxine (SYNTHROID/LEVOTHROID) 175 MCG tablet Take 1 tablet (175 mcg) by mouth daily   La Anderson NP Yes    losartan (COZAAR) 50 MG tablet TAKE ONE TABLET BY MOUTH ONCE DAILY   La Anderson NP Yes    OneTouch Delica Lancets 33G MISC 1 Box 3 times daily To check BG 3 times per day   La Anderson NP     pravastatin (PRAVACHOL) 20 MG tablet Take 1 tablet (20 mg) by mouth daily   La Anderson NP Yes    topiramate (TOPAMAX) 25 MG tablet Take 1 tablet (25 mg) by mouth At Bedtime for 7 days, THEN 1 tablet (25 mg) 2 times daily for 7 days, THEN 2 tablets (50 mg) 2 times daily.   La Anderson NP Yes 9/21/21   venlafaxine (EFFEXOR-XR) 75 MG 24 hr capsule TAKE THREE CAPSULES BY MOUTH ONCE DAILY   La Anderson NP Yes         Current heard:   Current Outpatient Medications   Medication     ammonium lactate (LAC-HYDRIN) 12 % external cream     aspirin (ASA) 81 MG chewable tablet     blood glucose (NO BRAND SPECIFIED) lancets standard     blood glucose (NO BRAND SPECIFIED) test strip     blood glucose monitoring (NO BRAND SPECIFIED) meter device kit     Continuous Blood Gluc  (FREESTYLE RICH 14 DAY READER) MAGDALENO     Continuous Blood Gluc Sensor (FREESTYLE RICH 14 DAY SENSOR) MISC     dulaglutide (TRULICITY) 0.75 MG/0.5ML pen     insulin glargine (LANTUS SOLOSTAR) 100 UNIT/ML pen     insulin pen needle (31G X 5 MM) 31G X 5 MM miscellaneous     levothyroxine (SYNTHROID/LEVOTHROID) 175 MCG tablet     losartan (COZAAR) 50 MG tablet     OneTouch Delica Lancets 33G MISC     pravastatin (PRAVACHOL) 20 MG tablet     topiramate (TOPAMAX) 25 MG tablet     venlafaxine (EFFEXOR-XR) 75 MG 24 hr capsule     No current facility-administered medications for this visit.       Family History:  Family History   Problem Relation Age of Onset     Neurologic Disorder Mother         Stroke     Heart Disease Maternal Grandfather      [email or web address].[email or web address]. Maternal  Grandmother      Breast Cancer No family hx of      Cancer - colorectal No family hx of      Melanoma No family hx of        Social History:  Social History     Tobacco Use     Smoking status: Former     Types: Cigarettes     Smokeless tobacco: Never     Tobacco comments:     1-2 cigarettes occasionally   Vaping Use     Vaping status: Not on file   Substance Use Topics     Alcohol use: No     Alcohol/week: 0.0 standard drinks of alcohol         Physical examination:  General appearance: seated comfortably in the chair during assessment. Not in any acute distress  HEENT: PEERLA. Oral cavity is moist and clear. No thyromegaly  Lungs: bilateral air entry equal. Clear to auscultation. No rhonchi or crepitations heard  Heart: S1 S2 normal. Pulse: regular rate and rhythm, good volume  Abdomen: soft, nontender, nondistended  Neurological: conscious and oriented. Speech: normal. Moving all four extremities equally  Extremities: no edema noted. Dorsalis pedis 2+ bilaterally. No ulcers noted. No tremor is noted over her outstretched hands  Psychiatric: normal mood and affect. Normal judgment  Foot examination: No ulcers monofilament test negative  Endocrine Labs:     Latest Ref Rng 10/20/2022  6:59 AM 1/26/2023  6:24 AM   ENDO DIABETES      GLUCOSE 70 - 99 mg/dL 246 (H)  284 (H)    Hemoglobin A1C <5.7 % 10.9 (H)  10.6 (H)    A1C, POC 4.3 - <5.7 %     ALT 10 - 35 U/L  54 (H)    AST 10 - 35 U/L  60 (H)    Cholesterol <200 mg/dL  214 (H)    LDL Cholesterol Calculated <=100 mg/dL  121 (H)    HDL Cholesterol >=50 mg/dL  30 (L)    Non HDL Cholesterol <130 mg/dL  184 (H)    VLDL-Cholesterol 0 - 30 mg/dL     Triglycerides <150 mg/dL  313 (H)    TRIG (EXT) <150 mg/dL  313 (H)    Creatinine 0.51 - 0.95 mg/dL 1.08 (H)  1.09 (H)    Hematocrit 35.0 - 47.0 % 44.8     Hemoglobin 11.7 - 15.7 g/dL 14.7     Albumin Urine mg/L mg/L     Albumin Urine mg/L mg/L     Albumin Urine mg/g Cr 0 - 25 mg/g Cr     Albumin Urine mg/g Cr 0.00 - 25.00 mg/g  Cr     Potassium 3.4 - 5.3 mmol/L 5.1  5.0    RBC Count 3.80 - 5.20 10e6/uL 5.32 (H)     RDW 10.0 - 15.0 % 14.1     WBC 4.0 - 11.0 10e3/uL 8.9     MCH 26.5 - 33.0 pg 27.6     MCHC 31.5 - 36.5 g/dL 32.8     MCV 78 - 100 fL 84     Platelet Count 150 - 450 10e3/uL 261        Latest Ref Rn 4/27/2023  11:29 AM   ENDO DIABETES     GLUCOSE 70 - 99 mg/dL    Hemoglobin A1C <5.7 %    A1C, POC 4.3 - <5.7 % 11.8 !    ALT 10 - 35 U/L    AST 10 - 35 U/L    Cholesterol <200 mg/dL    LDL Cholesterol Calculated <=100 mg/dL    HDL Cholesterol >=50 mg/dL    Non HDL Cholesterol <130 mg/dL    VLDL-Cholesterol 0 - 30 mg/dL    Triglycerides <150 mg/dL    TRIG (EXT) <150 mg/dL    Creatinine 0.51 - 0.95 mg/dL    Hematocrit 35.0 - 47.0 %    Hemoglobin 11.7 - 15.7 g/dL    Albumin Urine mg/L mg/L    Albumin Urine mg/L mg/L    Albumin Urine mg/g Cr 0 - 25 mg/g Cr    Albumin Urine mg/g Cr 0.00 - 25.00 mg/g Cr    Potassium 3.4 - 5.3 mmol/L    RBC Count 3.80 - 5.20 10e6/uL    RDW 10.0 - 15.0 %    WBC 4.0 - 11.0 10e3/uL    MCH 26.5 - 33.0 pg    MCHC 31.5 - 36.5 g/dL    MCV 78 - 100 fL    Platelet Count 150 - 450 10e3/uL       Legend:  (H) High  (L) Low  ! Abnormal   Latest Ref Rn 10/20/2022  6:59 AM 12/6/2022  7:10 AM 1/26/2023  6:24 AM   ENDO THYROID LABS-[facility]       TSH 0.40 - 4.00 mU/L      TSH 0.30 - 4.20 uIU/mL 5.01 (H)  2.42     FREE T4 0.90 - 1.70 ng/dL         Legend:  (H) High       Assessment and Plan:     73-year-old patient with uncontrolled type 2 diabetes mellitus with hyperglycemia, Obesity BMI: 34.1,neuropathy, HLD, microalbuminuria who lives in a nursing home and is referred to our clinic for further management    #Uncontrolled type 2 diabetes mellitus, hemoglobin A1c 11.8 today, complicated by neuropathy, microalbuminuria and hyperlipidemia  #BMI 34    On reviewing her blood sugars from nursing home, fastings are in the 250 range, and her premeals are in the 300-400 range.  Bedtime is in the 400s contributing to residual  hyperglycemia in the am.  She is on higher basal insulin at 45 units twice daily of Lantus and only 8 units of NovoLog with meals.  She does report that all her insulin doses are given appropriately.  She will benefit from increase mealtime coverage + correction insulin as well as GLP-1 receptor analogs.  In the future adding SGLT 2 inhibitor would also be helpful given her microalbuminuria    -Continue Lantus 48 units twice daily  -Increase NovoLog to 12 units before each meal  - Increase NovoLog to 3 units per 50 mg/dL above 150 mg/dL before meals and at bedtime  -Blood glucose checks fasting, premeals and bedtime  -Start Ozempic at 0.25 mg weekly once for 4 weeks and can increase to 0.5 mg weekly once subcutaneous till she sees us next  -If fasting blood glucoses less than 120 persistently can reduce Lantus dosing by 5 units each dose  -Check C-peptide with concurrent blood glucose       #Diabetes complications  Retinopathy: last eye exam was in NH 7 months ago,no PDR as reported by patient   Nephropathy:creat: 1.05 in 2023, has micro albuminuria  Neuropathy: yes in her feet  LDL: 121, restart pravastatin 20 mg daily, monitor LFTs  ASA: yes  Smoking: none  BP:well controlled , on losartan 50 mg daily, if her urine microalbumin remains persistently elevated can increase her losartan further    #Hypothyroidism  -Currently on levothyroxine 200 mcg daily  -We will check TFTs    Return clinic in 3 months    The patient was seen, examined and discussed with MD Magdalena Campa MD.  Endocrinology fellow    Attending addendum:  Pt was evaluated with endocrinology fellow & plan is as outlined in this note. Patient needs aggressive mgmt of diabetes to avoid further complications. FT4 wnl, TSH is not. Rely upon FT4 in future. If TSH abnormality persists, further w/u is warranted.  Dr. Selene Mooney MD, MPH  Endocrinologist     Latest Ref Rng 10/20/2022  6:59 AM 12/6/2022  7:10 AM  4/27/2023  12:59 PM   ENDO THYROID LABS-[facility]       TSH 0.40 - 4.00 mU/L      TSH 0.30 - 4.20 uIU/mL 5.01 (H)  2.42  10.25 (H)    FREE T4 0.90 - 1.70 ng/dL   1.35        Latest Ref Rng 4/27/2023  12:59 PM   ENDO DIABETES     GLUCOSE 70 - 99 mg/dL 319 (H)        Latest Ref Rng 4/27/2023  12:47 PM   ENDO DIABETES     Albumin Urine mg/g Cr 0.00 - 25.00 mg/g Cr 2,802.99 (H)    Potassium 3.4 - 5.3 mmol/L       Lab Results   Component Value Date    A1C 10.6 01/26/2023    A1C 10.9 10/20/2022    A1C 7.2 02/07/2022    A1C 12.0 03/04/2021    A1C 12.6 05/20/2020    A1C 13.2 01/08/2020    A1C 9.9 06/26/2019    A1C 6.7 01/16/2018       Total Time: 60 min spent on chart review, evaluation, management, counseling, education, & motivational interviewing on the day of encounter

## 2023-04-27 NOTE — LETTER
4/27/2023       RE: Cyndy Wang  [address]  [address]  [place] [address]     Dear Colleague,    Thank you for referring your patient, Cyndy Wang, to the [facility] [place] [facility]. Please see a copy of my visit note below.    Patient is showing 3/5 MNCM met. A1c not in range   ANJALI Bazan   Outcome for 04/24/23 3:08 PM :Sent patient [employer] message asking them to upload their BG data   Fabienne Gt  Outcome for 04/25/23 3:06 PM :Unable to Leave    Fabienne Del Real                  Endocrinology Clinic New Consult      Cyndy Wang MRN:[id number] YOB: 1950  Primary care provider: La Anderson     Reason for Endocrine consult:  T2DM w/ hyperglycemia    HPI:  Cyndy Wang is a 73 year old female with T2DM, Obesity BMI: 34.1,neuropathy, HLD, microalbuminuria who resides in a nursing home.    No nausea, vomiting,abdominal pain, has pain in her lower extremities or blurring of vision  She was previously tried on metformin multiple times - nausea, vomiting  She doesn't recollect using trulicity before, it was given as a sample in 10/2021  Has not had any low BG recently    Current DM medications:  lantus 48 units bid  novolog 8 units with each meal  Novolog 2/50 above 150 mg/dl with meals  Nurse administers it in the nursing home, she has been there for last 2 years    Bg log: reviewed from her NH  Fasting ranged between 284- 299  Pre lunch: 340-415  Pre dinner: 364-453  Bedtime 378    Reports no low BG recently  Diet: eats 3 meals a day, snacks very rarely  Uses wheel chair most of the time    Diabetes complications include:  Retinopathy: last eye exam was in NH 7 months ago,no PDR as reported by patient   Nephropathy:creat: 1.05 in 2023, has micro albuminuria  Neuropathy: yes in her feet  LDL: 121, was previously on pravastatin 20 mg daily, not on her medication list at this point  ASA:  yes  Smoking: none  BP:well controlled     FH: No family history of diabetes, brother with pancreatic cancer    Sh: no tobacco use or alcohol use. Has 1 kid, . Lives at nursing home.    She gets Lt4 200 mcg daily, complaint, takes it early Am. She mentions she gained 40 lbs , she attributes it to eating    ROS:  All 12 systems were reviewed and negative except as mentioned in HPI    Past Medical/Surgical History:  Past Medical History:   Diagnosis Date    Diabetes (H)     Hypertension     Obese     Thyroid disease     Type 2 diabetes mellitus with other diabetic kidney complication (H) 10/24/2015     Past Surgical History:   Procedure Laterality Date    REPAIR TENDON FINGER(S) Right 5/22/2015    Procedure: REPAIR TENDON FINGER(S);  Surgeon: Xavier Paulson MD;  Location: [facility] OR       Allergies:  Allergies   Allergen Reactions    Sulfa Antibiotics Difficulty breathing    Lisinopril Cough    Pcn [Penicillins] Difficulty breathing       PTA Meds:  Prior to Admission medications    Medication Sig Last Dose Taking? Auth Provider Long Term End Date   ammonium lactate (LAC-HYDRIN) 12 % external cream Apply topically 2 times daily as needed for dry skin   Red Robles, NIKO     aspirin (ASA) 81 MG chewable tablet Take 1 tablet (81 mg) by mouth daily   La Anderson NP     blood glucose (NO BRAND SPECIFIED) lancets standard 1 each by In Vitro route 3 times daily Use to test blood sugar daily whatever brand her insurance will pay for   La Anderson NP     blood glucose (NO BRAND SPECIFIED) test strip 1 strip by In Vitro route 3 times daily Use to test blood sugars daily. What ever her insurance will pay for   La Anderson NP     blood glucose monitoring (NO BRAND SPECIFIED) meter device kit by In Vitro route 3 times daily Use to test blood sugar daily. What ever her insurance will pay for   La Anderson NP     Continuous Blood Gluc  (FREESTYLE RICH 14 DAY  READER) MAGDALENO 1 Device continuous To use per manufactures directions   La Anderson NP     Continuous Blood Gluc Sensor (FREESTYLE RICH 14 DAY SENSOR) MISC 1 Box continuous To change out every 14 days   La Anderson NP     dulaglutide (TRULICITY) 0.75 MG/0.5ML pen Inject 0.75 mg Subcutaneous every 7 days   La Anderson NP     insulin glargine (LANTUS SOLOSTAR) 100 UNIT/ML pen Inject 26 Units Subcutaneous 2 times daily   La Anderson NP Yes    insulin pen needle (31G X 5 MM) 31G X 5 MM miscellaneous Use 2 pen needles daily or as directed.   La Anderson NP     levothyroxine (SYNTHROID/LEVOTHROID) 175 MCG tablet Take 1 tablet (175 mcg) by mouth daily   La Anderson NP Yes    losartan (COZAAR) 50 MG tablet TAKE ONE TABLET BY MOUTH ONCE DAILY   La Anderson NP Yes    OneTouch Delica Lancets 33G MISC 1 Box 3 times daily To check BG 3 times per day   La Anderson NP     pravastatin (PRAVACHOL) 20 MG tablet Take 1 tablet (20 mg) by mouth daily   La Anderson NP Yes    topiramate (TOPAMAX) 25 MG tablet Take 1 tablet (25 mg) by mouth At Bedtime for 7 days, THEN 1 tablet (25 mg) 2 times daily for 7 days, THEN 2 tablets (50 mg) 2 times daily.   La Anderson NP Yes 9/21/21   venlafaxine (EFFEXOR-XR) 75 MG 24 hr capsule TAKE THREE CAPSULES BY MOUTH ONCE DAILY   La Anderson NP Yes         Current heard:   Current Outpatient Medications   Medication    ammonium lactate (LAC-HYDRIN) 12 % external cream    aspirin (ASA) 81 MG chewable tablet    blood glucose (NO BRAND SPECIFIED) lancets standard    blood glucose (NO BRAND SPECIFIED) test strip    blood glucose monitoring (NO BRAND SPECIFIED) meter device kit    Continuous Blood Gluc  (FREESTYLE RICH 14 DAY READER) MAGDALENO    Continuous Blood Gluc Sensor (FREESTYLE RICH 14 DAY SENSOR) MISC    dulaglutide (TRULICITY) 0.75 MG/0.5ML pen    insulin glargine (LANTUS  SOLOSTAR) 100 UNIT/ML pen    insulin pen needle (31G X 5 MM) 31G X 5 MM miscellaneous    levothyroxine (SYNTHROID/LEVOTHROID) 175 MCG tablet    losartan (COZAAR) 50 MG tablet    OneTouch Delica Lancets 33G MISC    pravastatin (PRAVACHOL) 20 MG tablet    topiramate (TOPAMAX) 25 MG tablet    venlafaxine (EFFEXOR-XR) 75 MG 24 hr capsule     No current facility-administered medications for this visit.       Family History:  Family History   Problem Relation Age of Onset    Neurologic Disorder Mother         Stroke    Heart Disease Maternal Grandfather     C.A.D. Maternal Grandmother     Breast Cancer No family hx of     Cancer - colorectal No family hx of     Melanoma No family hx of        Social History:  Social History     Tobacco Use    Smoking status: Former     Types: Cigarettes    Smokeless tobacco: Never    Tobacco comments:     1-2 cigarettes occasionally   Vaping Use    Vaping status: Not on file   Substance Use Topics    Alcohol use: No     Alcohol/week: 0.0 standard drinks of alcohol         Physical examination:  General appearance: seated comfortably in the chair during assessment. Not in any acute distress  HEENT: PEERLA. Oral cavity is moist and clear. No thyromegaly  Lungs: bilateral air entry equal. Clear to auscultation. No rhonchi or crepitations heard  Heart: S1 S2 normal. Pulse: regular rate and rhythm, good volume  Abdomen: soft, nontender, nondistended  Neurological: conscious and oriented. Speech: normal. Moving all four extremities equally  Extremities: no edema noted. Dorsalis pedis 2+ bilaterally. No ulcers noted. No tremor is noted over her outstretched hands  Psychiatric: normal mood and affect. Normal judgment  Foot examination: No ulcers monofilament test negative  Endocrine Labs:     Latest Ref Rng 10/20/2022  6:59 AM 1/26/2023  6:24 AM   ENDO DIABETES      GLUCOSE 70 - 99 mg/dL 246 (H)  284 (H)    Hemoglobin A1C <5.7 % 10.9 (H)  10.6 (H)    A1C, POC 4.3 - <5.7 %     ALT 10 - 35 U/L  54  (H)    AST 10 - 35 U/L  60 (H)    Cholesterol <200 mg/dL  214 (H)    LDL Cholesterol Calculated <=100 mg/dL  121 (H)    HDL Cholesterol >=50 mg/dL  30 (L)    Non HDL Cholesterol <130 mg/dL  184 (H)    VLDL-Cholesterol 0 - 30 mg/dL     Triglycerides <150 mg/dL  313 (H)    TRIG (EXT) <150 mg/dL  313 (H)    Creatinine 0.51 - 0.95 mg/dL 1.08 (H)  1.09 (H)    Hematocrit 35.0 - 47.0 % 44.8     Hemoglobin 11.7 - 15.7 g/dL 14.7     Albumin Urine mg/L mg/L     Albumin Urine mg/L mg/L     Albumin Urine mg/g Cr 0 - 25 mg/g Cr     Albumin Urine mg/g Cr 0.00 - 25.00 mg/g Cr     Potassium 3.4 - 5.3 mmol/L 5.1  5.0    RBC Count 3.80 - 5.20 10e6/uL 5.32 (H)     RDW 10.0 - 15.0 % 14.1     WBC 4.0 - 11.0 10e3/uL 8.9     MCH 26.5 - 33.0 pg 27.6     MCHC 31.5 - 36.5 g/dL 32.8     MCV 78 - 100 fL 84     Platelet Count 150 - 450 10e3/uL 261        Latest Ref [facility] 4/27/2023  11:29 AM   ENDO DIABETES     GLUCOSE 70 - 99 mg/dL    Hemoglobin A1C <5.7 %    A1C, POC 4.3 - <5.7 % 11.8 !    ALT 10 - 35 U/L    AST 10 - 35 U/L    Cholesterol <200 mg/dL    LDL Cholesterol Calculated <=100 mg/dL    HDL Cholesterol >=50 mg/dL    Non HDL Cholesterol <130 mg/dL    VLDL-Cholesterol 0 - 30 mg/dL    Triglycerides <150 mg/dL    TRIG (EXT) <150 mg/dL    Creatinine 0.51 - 0.95 mg/dL    Hematocrit 35.0 - 47.0 %    Hemoglobin 11.7 - 15.7 g/dL    Albumin Urine mg/L mg/L    Albumin Urine mg/L mg/L    Albumin Urine mg/g Cr 0 - 25 mg/g Cr    Albumin Urine mg/g Cr 0.00 - 25.00 mg/g Cr    Potassium 3.4 - 5.3 mmol/L    RBC Count 3.80 - 5.20 10e6/uL    RDW 10.0 - 15.0 %    WBC 4.0 - 11.0 10e3/uL    MCH 26.5 - 33.0 pg    MCHC 31.5 - 36.5 g/dL    MCV 78 - 100 fL    Platelet Count 150 - 450 10e3/uL       Legend:  (H) High  (L) Low  ! Abnormal   Latest Ref Rng 10/20/2022  6:59 AM 12/6/2022  7:10 AM 1/26/2023  6:24 AM   ENDO THYROID LABS-UMP       TSH 0.40 - 4.00 mU/L      TSH 0.30 - 4.20 uIU/mL 5.01 (H)  2.42     FREE T4 0.90 - 1.70 ng/dL         Legend:  (H) High        Assessment and Plan:     73-year-old patient with uncontrolled type 2 diabetes mellitus with hyperglycemia, Obesity BMI: 34.1,neuropathy, HLD, microalbuminuria who lives in a nursing home and is referred to our clinic for further management    #Uncontrolled type 2 diabetes mellitus, hemoglobin A1c 11.8 today, complicated by neuropathy, microalbuminuria and hyperlipidemia  #BMI 34    On reviewing her blood sugars from nursing home, fastings are in the 250 range, and her premeals are in the 300-400 range.  Bedtime is in the 400s contributing to residual hyperglycemia in the am.  She is on higher basal insulin at 45 units twice daily of Lantus and only 8 units of NovoLog with meals.  She does report that all her insulin doses are given appropriately.  She will benefit from increase mealtime coverage + correction insulin as well as GLP-1 receptor analogs.  In the future adding SGLT 2 inhibitor would also be helpful given her microalbuminuria    -Continue Lantus 48 units twice daily  -Increase NovoLog to 12 units before each meal  - Increase NovoLog to 3 units per 50 mg/dL above 150 mg/dL before meals and at bedtime  -Blood glucose checks fasting, premeals and bedtime  -Start Ozempic at 0.25 mg weekly once for 4 weeks and can increase to 0.5 mg weekly once subcutaneous till she sees us next  -If fasting blood glucoses less than 120 persistently can reduce Lantus dosing by 5 units each dose  -Check C-peptide with concurrent blood glucose       #Diabetes complications  Retinopathy: last eye exam was in NH 7 months ago,no PDR as reported by patient   Nephropathy:creat: 1.05 in 2023, has micro albuminuria  Neuropathy: yes in her feet  LDL: 121, restart pravastatin 20 mg daily, monitor LFTs  ASA: yes  Smoking: none  BP:well controlled , on losartan 50 mg daily, if her urine microalbumin remains persistently elevated can increase her losartan further    #Hypothyroidism  -Currently on levothyroxine 200 mcg daily  -We will check  TFTs    Return clinic in 3 months    The patient was seen, examined and discussed with MD Magdalena Campa MD.  Endocrinology fellow    Attending addendum:  Pt was evaluated with endocrinology fellow & plan is as outlined in this note. Patient needs aggressive mgmt of diabetes to avoid further complications. FT4 wnl, TSH is not. Rely upon FT4 in future. If TSH abnormality persists, further w/u is warranted.  Dr. Selene Mooney MD, MPH  Endocrinologist     Latest Ref Rng 10/20/2022  6:59 AM 12/6/2022  7:10 AM 4/27/2023  12:59 PM   ENDO THYROID LABS-UMP       TSH 0.40 - 4.00 mU/L      TSH 0.30 - 4.20 uIU/mL 5.01 (H)  2.42  10.25 (H)    FREE T4 0.90 - 1.70 ng/dL   1.35        Latest Ref Rng 4/27/2023  12:59 PM   ENDO DIABETES     GLUCOSE 70 - 99 mg/dL 319 (H)        Latest Ref Rng 4/27/2023  12:47 PM   ENDO DIABETES     Albumin Urine mg/g Cr 0.00 - 25.00 mg/g Cr 2,802.99 (H)    Potassium 3.4 - 5.3 mmol/L       Lab Results   Component Value Date    A1C 10.6 01/26/2023    A1C 10.9 10/20/2022    A1C 7.2 02/07/2022    A1C 12.0 03/04/2021    A1C 12.6 05/20/2020    A1C 13.2 01/08/2020    A1C 9.9 06/26/2019    A1C 6.7 01/16/2018       Total Time: 60 min spent on chart review, evaluation, management, counseling, education, & motivational interviewing on the day of encounter      Sincerely,    Magdalena Mccann MD

## 2023-04-27 NOTE — PATIENT INSTRUCTIONS
Check blood glucose fasting, before meals and bedtime    Take lantus 48 units twice daily  Take novolog 12 units with meals three times a day    Take novolog 3 units per 50 mg/dl above 150 mg/dl before meals and bedtime  For BG > 150 mg/dl before meals and bedtime  151-200- 3 units  201- 250- 6 units  251- 300- 9 units  301-350 - 12 units  351- 400 - 15 units  Call provider if BG> 400 mg/dl    Restart pravastatin 20 mg daily  We will start you on ozempic 0.25 mg weekly once, for 4 weeks and then increase it to 0.5 mg weekly once    Get blood test and urine  test done

## 2023-04-27 NOTE — NURSING NOTE
[facility] sent MAR but not blood glucose. We are calling them to send the BG data over to us.    Chief Complaint   Patient presents with     New Patient     DM2       Diabetes     Blood pressure 138/83, pulse 89, weight 111.6 kg (246 lb), SpO2 94 %, not currently breastfeeding.    Assisted living would like the office notes sent back with the patient.    Fabienne Del Real

## 2023-04-28 ENCOUNTER — TELEPHONE (OUTPATIENT)
Dept: ENDOCRINOLOGY | Facility: CLINIC | Age: 73
End: 2023-04-28
Payer: COMMERCIAL

## 2023-04-28 DIAGNOSIS — Z79.4 TYPE 2 DIABETES MELLITUS WITH HYPERGLYCEMIA, WITH LONG-TERM CURRENT USE OF INSULIN (H): ICD-10-CM

## 2023-04-28 DIAGNOSIS — E11.65 TYPE 2 DIABETES MELLITUS WITH HYPERGLYCEMIA, WITH LONG-TERM CURRENT USE OF INSULIN (H): ICD-10-CM

## 2023-04-28 LAB
C PEPTIDE SERPL-MCNC: 9.5 NG/ML (ref 0.9–6.9)
CREAT UR-MCNC: 73.6 MG/DL
MICROALBUMIN UR-MCNC: 2063 MG/L
MICROALBUMIN/CREAT UR: 2802.99 MG/G CR (ref 0–25)

## 2023-04-28 NOTE — TELEPHONE ENCOUNTER
[facility] Call Center    Phone Message    May a detailed message be left on voicemail: yes     Reason for Call: Medication Question or concern regarding medication   Prescription Clarification    Name of Medication:   * semaglutide (OZEMPIC) 2 MG/3ML pen    Prescribing Provider: Ramy     Pharmacy: [address]     What on the order needs clarification? Per Shanti is wanting to get a call back. [facility] patient had an appt with Dr. Mccann yesterday 4/27/2023. [facility] patient was prescribed the medication [address] and they are needing clarification on the medication for the dosage and the entire medication request. Please advise      Action Taken: Message routed to:  [facility] Endo    Travel Screening: Not Applicable

## 2023-04-29 ENCOUNTER — HEALTH MAINTENANCE LETTER (OUTPATIENT)
Age: 73
End: 2023-04-29

## 2023-05-20 ENCOUNTER — TELEPHONE (OUTPATIENT)
Dept: ENDOCRINOLOGY | Facility: CLINIC | Age: 73
End: 2023-05-20

## 2023-06-27 NOTE — TELEPHONE ENCOUNTER
TSH : 10.25  on high dose of Lt4, need to know if she is taking salt tabs along with thyroid medication as that can reduce thyroid medication absorption

## 2023-07-19 ENCOUNTER — TELEPHONE (OUTPATIENT)
Dept: ENDOCRINOLOGY | Facility: CLINIC | Age: 73
End: 2023-07-19
Payer: COMMERCIAL

## 2023-07-19 NOTE — PROGRESS NOTES
Patient is showing 4/5 MNCM met. A1c not in range   Fabienne Del Real, VF  Outcome for 07/19/23 2:20 PM :Left Voicemail for patient to call back Requested BG data and MAR be faxed from assisted living.  Fabienne Del Real

## 2023-07-19 NOTE — TELEPHONE ENCOUNTER
Called and left a message at [facility] with Cyndy, Nurse manager, to have patient's BG data and MAR to be faxed to us.  Fabienne Del Real

## 2023-07-20 ENCOUNTER — OFFICE VISIT (OUTPATIENT)
Dept: ENDOCRINOLOGY | Facility: CLINIC | Age: 73
End: 2023-07-20
Payer: COMMERCIAL

## 2023-07-20 VITALS — HEART RATE: 83 BPM | DIASTOLIC BLOOD PRESSURE: 82 MMHG | SYSTOLIC BLOOD PRESSURE: 137 MMHG | OXYGEN SATURATION: 93 %

## 2023-07-20 DIAGNOSIS — E11.29 TYPE 2 DIABETES MELLITUS WITH OTHER DIABETIC KIDNEY COMPLICATION (H): ICD-10-CM

## 2023-07-20 DIAGNOSIS — E11.65 TYPE 2 DIABETES MELLITUS WITH HYPERGLYCEMIA, WITH LONG-TERM CURRENT USE OF INSULIN (H): ICD-10-CM

## 2023-07-20 DIAGNOSIS — E03.9 HYPOTHYROIDISM, UNSPECIFIED TYPE: ICD-10-CM

## 2023-07-20 DIAGNOSIS — Z79.4 TYPE 2 DIABETES MELLITUS WITH HYPERGLYCEMIA, WITH LONG-TERM CURRENT USE OF INSULIN (H): ICD-10-CM

## 2023-07-20 LAB — HBA1C MFR BLD: 11 % (ref 4.3–?)

## 2023-07-20 PROCEDURE — 83036 HEMOGLOBIN GLYCOSYLATED A1C: CPT | Performed by: INTERNAL MEDICINE

## 2023-07-20 PROCEDURE — 99214 OFFICE O/P EST MOD 30 MIN: CPT | Performed by: INTERNAL MEDICINE

## 2023-07-20 RX ORDER — LEVOTHYROXINE SODIUM 112 UG/1
224 TABLET ORAL DAILY
Qty: 180 TABLET | Refills: 3 | Status: SHIPPED | OUTPATIENT
Start: 2023-07-20

## 2023-07-20 RX ORDER — INSULIN GLARGINE 100 [IU]/ML
52 INJECTION, SOLUTION SUBCUTANEOUS 2 TIMES DAILY
Qty: 30 ML | Refills: 11 | Status: SHIPPED | OUTPATIENT
Start: 2023-07-20

## 2023-07-20 ASSESSMENT — PAIN SCALES - GENERAL: PAINLEVEL: NO PAIN (0)

## 2023-07-20 NOTE — PATIENT INSTRUCTIONS
We increase your ozempic to 1 mg weekly. Please let us know if you have any diarrhea, vomiting, nausea, GI upset  We will increase your insulin:  Lantus from 48 units twice a day to 52 units twice a day  Novolog from 12 units before meal to 14 units before meal  Continue with current correction scale  Increase levothyroxine to 224 mcg daily

## 2023-07-20 NOTE — PROGRESS NOTES
Endocrinology Clinic     Cyndy Wang MRN:[id number] YOB: 1950  Primary care provider: La Anderson     Medical Decision Making:   Diabetes Mellitus Type 2 : not at goal  Complication: neuropathy+ve, microalbuminuria, HLD  A1c today is 11%, goal is close to 8%  Monitor sugar with: glucometer; interpretation: FBG and mealtime fasting >300 all the time.  CGM is not covered by medicare for pts in nursing homes   kg  Cpeptide 9.5 in 2023    She is being treated with:  -lantus 48 units bid  -novolog 12 units tidAC  -novolog correction 3u/50 mg >150 tidAC  -ozempic 0.5 mg weekly  -nurse administers it in the nursing home    Metformin causes diarrhea, vomiting  Pt reported had UTI 6-8x/yearly.    DM Screening Needed Today  -none    #Hypothyroidism  Last tsh 10.25,  Take levothyroxine 200 mcg, first thing in the morning, with water, 30 minutes prior to breakfast, not taking any multivitamins    PLAN:  Levothyroxine increase to 225 mcg, repeat TSH in 2 months  Increase ozempic to 1 mg weekly  Increase lantus from 48 units bid to 52 units bid, aspart from 12 units tidac to 14 units TIDAC  Will request glucose reading in 1 month from the facility      Return in about 3 months (around 10/20/2023).    History of Present Illness:   Cyndy Wang a 73 year old is here for follow up of T2dm and hypothyroidism.      Interval Events/DM Concerns:  Lost 2 lbs. Was started on ozempic 0.5 mg weekly. Denied any nausea, vomiting, diarrhea abd cramping. Less appetite.  Pt sleeps 12 hours /day, feeling fatigue, feeling cold mostly    A1c: today 11.0%  Lab Results   Component Value Date    A1C 10.6 01/26/2023    A1C 10.9 10/20/2022    A1C 12.0 03/04/2021    A1C 12.6 05/20/2020       Weight:  Wt Readings from Last 2 Encounters:   04/27/23 111.6 kg (246 lb)   12/09/20 102.1 kg (225 lb)       She is being treated with:  -levothyroxine 200 mcg daily  -lantus 48 units daily  -novolog 12 units tidAC  -novolog  correction 3u/50 mg >150 tidAC  -ozempic 0.5 mg weekly  -nurse administers it in the nursing home    Metformin causes diarrhea, vomiting  Pt reported had UTI 6-8x/yearly.    She eats 3 times/day    Glucose Monitoring Information:  Patient monitors blood sugars at home using glucometer  Interpretation:    - 360s. Mostly in 300s  Prelunch: ~400  Pre dinner: 300-400  Bedtime: 300-400s    History of diabetes:  Per review of records, she has had diabetes since >2 years ago.     Diabetes Complication Screening:  -Hypoglycemia: -ve. has hypoglycemia awareness.  -Retinopathy: -ve. Last Eye Exam: 1 year ago. Patient receives eye care at NH.  -Nephropathy: + microalbuminuria   Lab Results   Component Value Date    CR 1.09 01/26/2023    CR 1.08 10/20/2022    CR 1.23 03/04/2021    CR 0.83 05/20/2020     No results found for: MICROALBUMIN  Lab Results   Component Value Date    MICROL 2,063.0 04/27/2023    MICROL 869 03/04/2021    MICROL 1,700 01/17/2019     -Neuropathy: +ve  -BP: well controlled  -ASCVD: On pravastatin 20 mg, not on her medication list    ROS:  All 12 systems were reviewed and negative except as mentioned in HPI    Past Medical/Surgical History:  Past Medical History:   Diagnosis Date    Diabetes (H)     Hypertension     Obese     Thyroid disease     Type 2 diabetes mellitus with other diabetic kidney complication (H) 10/24/2015     Past Surgical History:   Procedure Laterality Date    REPAIR TENDON FINGER(S) Right 5/22/2015    Procedure: REPAIR TENDON FINGER(S);  Surgeon: Xavier Paulson MD;  Location: [facility] OR       Allergies:  Allergies   Allergen Reactions    Sulfa Antibiotics Difficulty breathing    Lisinopril Cough    Pcn [Penicillins] Difficulty breathing       Current meds:   Current Outpatient Medications   Medication    insulin glargine (LANTUS SOLOSTAR) 100 UNIT/ML pen    levothyroxine (SYNTHROID/LEVOTHROID) 112 MCG tablet    semaglutide (OZEMPIC) 2 MG/3ML pen    Semaglutide, 1 MG/DOSE,  (OZEMPIC) 4 MG/3ML pen    acetaminophen (TYLENOL) 500 MG tablet    ammonium lactate (LAC-HYDRIN) 12 % external cream    aspirin (ASA) 81 MG chewable tablet    ASPIRIN LOW DOSE 81 MG EC tablet    blood glucose (NO BRAND SPECIFIED) lancets standard    blood glucose (NO BRAND SPECIFIED) test strip    blood glucose monitoring (NO BRAND SPECIFIED) meter device kit    chlorhexidine (PERIDEX) 0.12 % solution    ciprofloxacin (CIPRO) 250 MG tablet    ciprofloxacin (CIPRO) 500 MG tablet    Continuous Blood Gluc  (FREESTYLE RICH 14 DAY READER) MAGDALENO    Continuous Blood Gluc Sensor (FREESTYLE RICH 14 DAY SENSOR) MISC    fluticasone (FLONASE) 50 MCG/ACT nasal spray    Insulin Aspart FlexPen 100 UNIT/ML SOPN    insulin pen needle (31G X 5 MM) 31G X 5 MM miscellaneous    ipratropium - albuterol 0.5 mg/2.5 mg/3 mL (DUONEB) 0.5-2.5 (3) MG/3ML neb solution    losartan (COZAAR) 50 MG tablet    metoprolol tartrate (LOPRESSOR) 25 MG tablet    morphine sulfate (ROXANOL) 20 mg/mL (HIGH CONC) soln    nitroFURantoin macrocrystal-monohydrate (MACROBID) 100 MG capsule    NOVOLOG FLEXPEN 100 UNIT/ML soln    NOVOLOG VIAL 100 UNIT/ML soln    NYSTOP 991890 UNIT/GM powder    OneTouch Delica Lancets 33G MISC    pantoprazole (PROTONIX) 20 MG EC tablet    pravastatin (PRAVACHOL) 20 MG tablet    pravastatin (PRAVACHOL) 20 MG tablet    ROBAFEN 100 MG/5ML liquid    topiramate (TOPAMAX) 25 MG tablet    venlafaxine (EFFEXOR-XR) 75 MG 24 hr capsule     No current facility-administered medications for this visit.       Family History:  No family history of diabetes, brother with pancreatic cancer  Family History   Problem Relation Age of Onset    Neurologic Disorder Mother         Stroke    Heart Disease Maternal Grandfather     C.A.D. Maternal Grandmother     Breast Cancer No family hx of     Cancer - colorectal No family hx of     Melanoma No family hx of        Social History:  Social History     Tobacco Use    Smoking status: Former     Types:  Cigarettes    Smokeless tobacco: Never    Tobacco comments:     1-2 cigarettes occasionally   Substance Use Topics    Alcohol use: No     Alcohol/week: 0.0 standard drinks of alcohol         Pertinent Physical Exam:   /82 (BP Location: Right arm, Patient Position: Sitting, Cuff Size: Adult Regular)   Pulse 83   SpO2 93%      General appearance: seated comfortably in the wheelchair during assessment. Not in any acute distress  HEENT: PEERLA. Oral cavity is moist and clear. thyroid nonpalpable, no bruit. No proptosis or lid lag  Lungs: bilateral air entry equal. Clear to auscultation. No rhonchi or crepitations heard  Heart: S1 S2 normal. Pulse: tachycardic sinus rate and rhythm, good volume  Abdomen: soft, nontender, nondistended  Neurological: conscious and oriented. Speech: normal. Moving all four extremities equally.   Extremities: no edema noted.No tremor is noted over her outstretched hands  Psychiatric: normal mood and affect. Normal judgment      Discussed with Attending  Kin Ortiz MD  Endocrine Fellow  Pager # [phone]    I, Tatianna Londono, personally evaluated this patient. I discussed the patient with the fellow and agree with the assessment and plan of care as documented in the fellow's note. I  reviewed vital signs, medications, labs and imaging.    Key Findings:   Poorly controlled T2DM as per A1c, will adjust medications incl Ozempic to 1 mg and adjust Lantus insulin  Elevated TSH, increase LT4    Tatianna Londono MD  Endocrinology and Diabetes  Telephone contact:  [facility] [place] [phone]  [facility] [phone]

## 2023-07-20 NOTE — LETTER
7/20/2023       RE: Cyndy Wang  [address]  [address]  [place] [address]     Dear Colleague,    Thank you for referring your patient, Cyndy Wang, to the [facility] [place] [facility]. Please see a copy of my visit note below.    Patient is showing 4/5 MNCM met. A1c not in range   Fabienne Del Real,   Outcome for 07/19/23 2:20 PM :Left Voicemail for patient to call back Requested BG data and MAR be faxed from assisted living.  Fabienne Del Real                            Endocrinology Clinic     Cyndy Wang MRN:[id number] YOB: 1950  Primary care provider: La Anderson     Medical Decision Making:   Diabetes Mellitus Type 2 : not at goal  Complication: neuropathy+ve, microalbuminuria, HLD  A1c today is 11%, goal is close to 8%  Monitor sugar with: glucometer; interpretation: FBG and mealtime fasting >300 all the time.  CGM is not covered by medicare for pts in nursing homes   kg  Cpeptide 9.5 in 2023    She is being treated with:  -lantus 48 units bid  -novolog 12 units tidAC  -novolog correction 3u/50 mg >150 tidAC  -ozempic 0.5 mg weekly  -nurse administers it in the nursing home    Metformin causes diarrhea, vomiting  Pt reported had UTI 6-8x/yearly.    DM Screening Needed Today  -none    #Hypothyroidism  Last tsh 10.25,  Take levothyroxine 200 mcg, first thing in the morning, with water, 30 minutes prior to breakfast, not taking any multivitamins    PLAN:  Levothyroxine increase to 225 mcg, repeat TSH in 2 months  Increase ozempic to 1 mg weekly  Increase lantus from 48 units bid to 52 units bid, aspart from 12 units tidac to 14 units TIDAC  Will request glucose reading in 1 month from the facility      Return in about 3 months (around 10/20/2023).    History of Present Illness:   Cyndy Wang a 73 year old is here for follow up of T2dm and hypothyroidism.      Interval Events/DM Concerns:  Lost  2 lbs. Was started on ozempic 0.5 mg weekly. Denied any nausea, vomiting, diarrhea abd cramping. Less appetite.  Pt sleeps 12 hours /day, feeling fatigue, feeling cold mostly    A1c: today 11.0%  Lab Results   Component Value Date    A1C 10.6 01/26/2023    A1C 10.9 10/20/2022    A1C 12.0 03/04/2021    A1C 12.6 05/20/2020       Weight:  Wt Readings from Last 2 Encounters:   04/27/23 111.6 kg (246 lb)   12/09/20 102.1 kg (225 lb)       She is being treated with:  -levothyroxine 200 mcg daily  -lantus 48 units daily  -novolog 12 units tidAC  -novolog correction 3u/50 mg >150 tidAC  -ozempic 0.5 mg weekly  -nurse administers it in the nursing home    Metformin causes diarrhea, vomiting  Pt reported had UTI 6-8x/yearly.    She eats 3 times/day    Glucose Monitoring Information:  Patient monitors blood sugars at home using glucometer  Interpretation:    - 360s. Mostly in 300s  Prelunch: ~400  Pre dinner: 300-400  Bedtime: 300-400s    History of diabetes:  Per review of records, she has had diabetes since >2 years ago.     Diabetes Complication Screening:  -Hypoglycemia: -ve. has hypoglycemia awareness.  -Retinopathy: -ve. Last Eye Exam: 1 year ago. Patient receives eye care at NH.  -Nephropathy: + microalbuminuria   Lab Results   Component Value Date    CR 1.09 01/26/2023    CR 1.08 10/20/2022    CR 1.23 03/04/2021    CR 0.83 05/20/2020     No results found for: MICROALBUMIN  Lab Results   Component Value Date    MICROL 2,063.0 04/27/2023    MICROL 869 03/04/2021    MICROL 1,700 01/17/2019     -Neuropathy: +ve  -BP: well controlled  -ASCVD: On pravastatin 20 mg, not on her medication list    ROS:  All 12 systems were reviewed and negative except as mentioned in HPI    Past Medical/Surgical History:  Past Medical History:   Diagnosis Date    Diabetes (H)     Hypertension     Obese     Thyroid disease     Type 2 diabetes mellitus with other diabetic kidney complication (H) 10/24/2015     Past Surgical History:    Procedure Laterality Date    REPAIR TENDON FINGER(S) Right 5/22/2015    Procedure: REPAIR TENDON FINGER(S);  Surgeon: Xavier Paulson MD;  Location: [facility] OR       Allergies:  Allergies   Allergen Reactions    Sulfa Antibiotics Difficulty breathing    Lisinopril Cough    Pcn [Penicillins] Difficulty breathing       Current meds:   Current Outpatient Medications   Medication    insulin glargine (LANTUS SOLOSTAR) 100 UNIT/ML pen    levothyroxine (SYNTHROID/LEVOTHROID) 112 MCG tablet    semaglutide (OZEMPIC) 2 MG/3ML pen    Semaglutide, 1 MG/DOSE, (OZEMPIC) 4 MG/3ML pen    acetaminophen (TYLENOL) 500 MG tablet    ammonium lactate (LAC-HYDRIN) 12 % external cream    aspirin (ASA) 81 MG chewable tablet    ASPIRIN LOW DOSE 81 MG EC tablet    blood glucose (NO BRAND SPECIFIED) lancets standard    blood glucose (NO BRAND SPECIFIED) test strip    blood glucose monitoring (NO BRAND SPECIFIED) meter device kit    chlorhexidine (PERIDEX) 0.12 % solution    ciprofloxacin (CIPRO) 250 MG tablet    ciprofloxacin (CIPRO) 500 MG tablet    Continuous Blood Gluc  (FREESTYLE RICH 14 DAY READER) MAGDALENO    Continuous Blood Gluc Sensor (CloudPrimeSTYLE RICH 14 DAY SENSOR) MISC    fluticasone (FLONASE) 50 MCG/ACT nasal spray    Insulin Aspart FlexPen 100 UNIT/ML SOPN    insulin pen needle (31G X 5 MM) 31G X 5 MM miscellaneous    ipratropium - albuterol 0.5 mg/2.5 mg/3 mL (DUONEB) 0.5-2.5 (3) MG/3ML neb solution    losartan (COZAAR) 50 MG tablet    metoprolol tartrate (LOPRESSOR) 25 MG tablet    morphine sulfate (ROXANOL) 20 mg/mL (HIGH CONC) soln    nitroFURantoin macrocrystal-monohydrate (MACROBID) 100 MG capsule    NOVOLOG FLEXPEN 100 UNIT/ML soln    NOVOLOG VIAL 100 UNIT/ML soln    NYSTOP 325695 UNIT/GM powder    OneTouch Delica Lancets 33G MISC    pantoprazole (PROTONIX) 20 MG EC tablet    pravastatin (PRAVACHOL) 20 MG tablet    pravastatin (PRAVACHOL) 20 MG tablet    ROBAFEN 100 MG/5ML liquid    topiramate (TOPAMAX) 25 MG tablet     venlafaxine (EFFEXOR-XR) 75 MG 24 hr capsule     No current facility-administered medications for this visit.       Family History:  No family history of diabetes, brother with pancreatic cancer  Family History   Problem Relation Age of Onset    Neurologic Disorder Mother         Stroke    Heart Disease Maternal Grandfather     LUCYAKERON. Maternal Grandmother     Breast Cancer No family hx of     Cancer - colorectal No family hx of     Melanoma No family hx of        Social History:  Social History     Tobacco Use    Smoking status: Former     Types: Cigarettes    Smokeless tobacco: Never    Tobacco comments:     1-2 cigarettes occasionally   Substance Use Topics    Alcohol use: No     Alcohol/week: 0.0 standard drinks of alcohol         Pertinent Physical Exam:   /82 (BP Location: Right arm, Patient Position: Sitting, Cuff Size: Adult Regular)   Pulse 83   SpO2 93%      General appearance: seated comfortably in the wheelchair during assessment. Not in any acute distress  HEENT: PEERLA. Oral cavity is moist and clear. thyroid nonpalpable, no bruit. No proptosis or lid lag  Lungs: bilateral air entry equal. Clear to auscultation. No rhonchi or crepitations heard  Heart: S1 S2 normal. Pulse: tachycardic sinus rate and rhythm, good volume  Abdomen: soft, nontender, nondistended  Neurological: conscious and oriented. Speech: normal. Moving all four extremities equally.   Extremities: no edema noted.No tremor is noted over her outstretched hands  Psychiatric: normal mood and affect. Normal judgment      Discussed with Attending  Kin Ortiz MD  Endocrine Fellow  Pager # [phone]    I, Tatianna Londono, personally evaluated this patient. I discussed the patient with the fellow and agree with the assessment and plan of care as documented in the fellow's note. I  reviewed vital signs, medications, labs and imaging.    Key Findings:   Poorly controlled T2DM as per A1c, will adjust medications incl Ozempic to 1 mg  and adjust Lantus insulin  Elevated TSH, increase LT4    Tatianna Londono MD  Endocrinology and Diabetes  Telephone contact:  [facility] [place] [phone]  [facility] [phone]

## 2023-08-09 NOTE — TELEPHONE ENCOUNTER
MTM referral: [facility] insurance    Outreach: telephone, attempt #2    Outcome:  No answer, no active phone number on file. No active MyChart use.     Anjali Richardson, PharmD, BCACP   Medication Management Pharmacist   [facility]  [phone]

## 2023-08-31 ENCOUNTER — TELEPHONE (OUTPATIENT)
Dept: PHARMACY | Facility: CLINIC | Age: 73
End: 2023-08-31
Payer: COMMERCIAL

## 2023-08-31 NOTE — CONFIDENTIAL NOTE
Called [facility] where the patient is staying per her profile. Spoke with nurse Ok who transferred me over to  and left a message for a call back.     Nate Graves, Pharm. D., BCACP  Medication Therapy Management Pharmacist

## 2023-10-23 NOTE — PROGRESS NOTES
Outcome for 10/23/23 3:57 PM :Call [facility] for BG data - [phone]   Fabienne Del Real  Outcome for 10/24/23 4:09 PM :Called [facility] at [phone] for BG data and MAR. They will fax them to our clinic.  Fabienne Del Real

## 2023-10-24 ENCOUNTER — TELEPHONE (OUTPATIENT)
Dept: ENDOCRINOLOGY | Facility: CLINIC | Age: 73
End: 2023-10-24
Payer: COMMERCIAL

## 2023-10-24 NOTE — TELEPHONE ENCOUNTER
Called [facility] at [phone] for BG data and MAR. They will fax them to our clinic for upcoming appt.  Fabienne Del Real

## 2023-10-25 NOTE — PROGRESS NOTES
Endocrinology Clinic     Cyndy Wang MRN:[id number] YOB: 1950  Primary care provider: La Anderson     Medical Decision Making:   Diabetes Mellitus Type 2 : not at goal  Complication: neuropathy+ve, microalbuminuria, HLD  A1c today is 9% (improved from close to 11% last visit), goal is close to 8%    Glucose Monitoring Information:  Patient monitors blood sugars at home using glucometer  Interpretation for the past 2 weeks:   FBG 200s  Premeal and bedtime 200-300s  Interpretation 1-2 months ago:   FBG 140s  Premeal and bedtime 160-200s    CGM is not covered by medicare for pts in nursing homes   kg  Cpeptide 9.5 in 2023     She is being treated with:  -lantus 52 units bid  -novolog 14 units tidAC  -novolog correction 3u/50 mg >150 tidAC  -ozempic 1 mg weekly - ran out due to shortage since 10/14/2023  -nurse administers it in the nursing home     Metformin causes diarrhea, vomiting  Pt reported had UTI 6-8x/yearly and so not prescribing SGLT2 inhb.     DM Screening Needed Today  -foot exam (done)     #Hypothyroidism  TSH 4.73   Take levothyroxine 224 mcg, first thing in the morning, with water, 30 minutes prior to breakfast, not taking any multivitamins     PLAN:  Increase ozempic to 2 mg weekly. Hopefully the patient can get the prescription since ozempic 2 mg pen is not in shortage. Discussed with the pt if she could not get the ozempic 2 mg, to call me. I will likely prescribe mounjaro for her.  Continue rest of regimen for now.  During next appointment, need to discuss about CGM since the pt will be moving to assisted living, and they would allow the use of CGM there.    Return in about 3 months (around 1/26/2024).    History of Present Illness:   Cyndy Wang a 73 year old is here for follow up of T2dm and thyroid abnormalities.      Interval Events/DM Concerns:  Last ozempic was 10/14/2023. Pharmacy does not have supply for the ozempic    A1c:  Lab Results   Component Value  "Date    A1C 10.6 01/26/2023    A1C 10.9 10/20/2022    A1C 12.0 03/04/2021    A1C 12.6 05/20/2020       Weight:  Wt Readings from Last 2 Encounters:   10/26/23 109.8 kg (242 lb)   04/27/23 111.6 kg (246 lb)       She is being treated with:  -levothyroxine 224 mcg daily  -lantus 52 units bid  -novolog 14 units tidAC  -novolog correction 3u/50 mg >150 tidAC  -ozempic 1 mg weekly - ran out due to shortage since 10/14/2023  -nurse administers it in the nursing home     Metformin causes diarrhea, vomiting  Pt reported had UTI 6-8x/yearly.     She eats 3 times/day    Glucose Monitoring Information:  Patient monitors blood sugars at home using glucometer  Interpretation for the past 2 weeks:   FBG 200s  Premeal and bedtime 200-300s  Interpretation 1-2 months ago:   FBG 140s  Premeal and bedtime 160-200s    History of diabetes:    Per review of records, she has had diabetes since >2 years ago.     Diabetes Complication Screening:  -Hypoglycemia: -ve. has hypoglycemia awareness.  -Retinopathy: -ve. Last Eye Exam: 1 year ago. Patient receives eye care at NH.  -Nephropathy: + microalbuminuria   Lab Results   Component Value Date    CR 1.09 01/26/2023    CR 1.08 10/20/2022    CR 1.23 03/04/2021    CR 0.83 05/20/2020     No results found for: \"MICROALBUMIN\"  Lab Results   Component Value Date    MICROL 2,063.0 04/27/2023    MICROL 869 03/04/2021    MICROL 1,700 01/17/2019     -Neuropathy: +ve  -BP: well controlled  -ASCVD: On pravastatin 20 mg, not on her medication list     ROS:  All 12 systems were reviewed and negative except as mentioned in HPI    Past Medical/Surgical History:  Past Medical History:   Diagnosis Date     Diabetes (H)      Hypertension      Obese      Thyroid disease      Type 2 diabetes mellitus with other diabetic kidney complication (H) 10/24/2015     Past Surgical History:   Procedure Laterality Date     REPAIR TENDON FINGER(S) Right 5/22/2015    Procedure: REPAIR TENDON FINGER(S);  Surgeon: Lorene" Xavier CAR MD;  Location: [facility] OR       Allergies:  Allergies   Allergen Reactions     Sulfa Antibiotics Difficulty breathing     Lisinopril Cough     Pcn [Penicillins] Difficulty breathing       Current meds:   Current Outpatient Medications   Medication     acetaminophen (TYLENOL) 500 MG tablet     ammonium lactate (LAC-HYDRIN) 12 % external cream     aspirin (ASA) 81 MG chewable tablet     ASPIRIN LOW DOSE 81 MG EC tablet     blood glucose (NO BRAND SPECIFIED) lancets standard     blood glucose (NO BRAND SPECIFIED) test strip     blood glucose monitoring (NO BRAND SPECIFIED) meter device kit     chlorhexidine (PERIDEX) 0.12 % solution     ciprofloxacin (CIPRO) 250 MG tablet     ciprofloxacin (CIPRO) 500 MG tablet     Continuous Blood Gluc  (FREESTYLE RICH 14 DAY READER) MAGDALENO     Continuous Blood Gluc Sensor (FREESTYLE RICH 14 DAY SENSOR) MISC     fluticasone (FLONASE) 50 MCG/ACT nasal spray     Insulin Aspart FlexPen 100 UNIT/ML SOPN     insulin glargine (LANTUS SOLOSTAR) 100 UNIT/ML pen     insulin pen needle (31G X 5 MM) 31G X 5 MM miscellaneous     ipratropium - albuterol 0.5 mg/2.5 mg/3 mL (DUONEB) 0.5-2.5 (3) MG/3ML neb solution     levothyroxine (SYNTHROID/LEVOTHROID) 112 MCG tablet     losartan (COZAAR) 50 MG tablet     metoprolol tartrate (LOPRESSOR) 25 MG tablet     morphine sulfate (ROXANOL) 20 mg/mL (HIGH CONC) soln     nitroFURantoin macrocrystal-monohydrate (MACROBID) 100 MG capsule     NOVOLOG FLEXPEN 100 UNIT/ML soln     NOVOLOG VIAL 100 UNIT/ML soln     NYSTOP 427471 UNIT/GM powder     OneTouch Delica Lancets 33G MISC     pantoprazole (PROTONIX) 20 MG EC tablet     pravastatin (PRAVACHOL) 20 MG tablet     pravastatin (PRAVACHOL) 20 MG tablet     ROBAFEN 100 MG/5ML liquid     semaglutide (OZEMPIC) 2 MG/3ML pen     Semaglutide, 1 MG/DOSE, (OZEMPIC) 4 MG/3ML pen     venlafaxine (EFFEXOR-XR) 75 MG 24 hr capsule     topiramate (TOPAMAX) 25 MG tablet     No current facility-administered  medications for this visit.       Family History:  Family History   Problem Relation Age of Onset     Neurologic Disorder Mother         Stroke     Heart Disease Maternal Grandfather      C.A.D. Maternal Grandmother      Breast Cancer No family hx of      Cancer - colorectal No family hx of      Melanoma No family hx of        Social History:  Social History     Tobacco Use     Smoking status: Former     Types: Cigarettes     Smokeless tobacco: Never     Tobacco comments:     1-2 cigarettes occasionally   Substance Use Topics     Alcohol use: No     Alcohol/week: 0.0 standard drinks of alcohol         Pertinent Physical Exam:   /70 (BP Location: Left arm, Patient Position: Sitting, Cuff Size: Adult Regular)   Pulse 87   Wt 109.8 kg (242 lb)   SpO2 96%   BMI 36.80 kg/m    General: pleasant, NAD, on wheel chair  HEENT: normocephalic, atraumatic. Oral mucous membranes moist.   CV: RRR, HR 87  Lungs: unlabored respiration, no cough  ABD: rounded, nondistended  Skin: warm and dry, no obvious lesions  MSK:  moves all extremities  Lymp:  no LE edema   Mental status:  alert, oriented to self, place, time  Neuro: cranial nerve grossly intact  Psych:  calm and appropriate interaction   Foot exam: edema+, no ulcer noted, mild pulse palpable on both tibial and DP site, monofilament 5/8 sites  Pertinent Labs and Imaging:        Discussed with Attending  Kin Ortiz MD  Endocrine Fellow  Pager # [phone]      I have seen and examined the patient, reviewed and edited the fellow's note, and agree with the plan of care.  TERRELL Elias

## 2023-10-26 ENCOUNTER — LAB (OUTPATIENT)
Dept: LAB | Facility: CLINIC | Age: 73
End: 2023-10-26
Payer: COMMERCIAL

## 2023-10-26 ENCOUNTER — OFFICE VISIT (OUTPATIENT)
Dept: ENDOCRINOLOGY | Facility: CLINIC | Age: 73
End: 2023-10-26
Payer: COMMERCIAL

## 2023-10-26 ENCOUNTER — TELEPHONE (OUTPATIENT)
Dept: FAMILY MEDICINE | Facility: CLINIC | Age: 73
End: 2023-10-26

## 2023-10-26 VITALS
BODY MASS INDEX: 36.8 KG/M2 | HEART RATE: 87 BPM | SYSTOLIC BLOOD PRESSURE: 104 MMHG | WEIGHT: 242 LBS | OXYGEN SATURATION: 96 % | DIASTOLIC BLOOD PRESSURE: 70 MMHG

## 2023-10-26 DIAGNOSIS — E11.29 TYPE 2 DIABETES MELLITUS WITH OTHER DIABETIC KIDNEY COMPLICATION (H): ICD-10-CM

## 2023-10-26 DIAGNOSIS — E11.65 TYPE 2 DIABETES MELLITUS WITH HYPERGLYCEMIA, WITH LONG-TERM CURRENT USE OF INSULIN (H): ICD-10-CM

## 2023-10-26 DIAGNOSIS — E03.9 HYPOTHYROIDISM, UNSPECIFIED TYPE: Primary | ICD-10-CM

## 2023-10-26 DIAGNOSIS — Z79.4 TYPE 2 DIABETES MELLITUS WITH HYPERGLYCEMIA, WITH LONG-TERM CURRENT USE OF INSULIN (H): ICD-10-CM

## 2023-10-26 DIAGNOSIS — E03.9 HYPOTHYROIDISM, UNSPECIFIED TYPE: ICD-10-CM

## 2023-10-26 LAB
HBA1C MFR BLD: 9 %
T4 FREE SERPL-MCNC: 1.41 NG/DL (ref 0.9–1.7)
TSH SERPL DL<=0.005 MIU/L-ACNC: 4.73 UIU/ML (ref 0.3–4.2)

## 2023-10-26 PROCEDURE — 36415 COLL VENOUS BLD VENIPUNCTURE: CPT | Performed by: PATHOLOGY

## 2023-10-26 PROCEDURE — 84439 ASSAY OF FREE THYROXINE: CPT | Performed by: PATHOLOGY

## 2023-10-26 PROCEDURE — 99214 OFFICE O/P EST MOD 30 MIN: CPT | Mod: GC | Performed by: INTERNAL MEDICINE

## 2023-10-26 PROCEDURE — 83036 HEMOGLOBIN GLYCOSYLATED A1C: CPT | Performed by: PATHOLOGY

## 2023-10-26 PROCEDURE — 84443 ASSAY THYROID STIM HORMONE: CPT | Performed by: PATHOLOGY

## 2023-10-26 RX ORDER — FLASH GLUCOSE SCANNING READER
1 EACH MISCELLANEOUS CONTINUOUS
Qty: 1 EACH | Refills: 1 | Status: SHIPPED | OUTPATIENT
Start: 2023-10-26

## 2023-10-26 ASSESSMENT — PAIN SCALES - GENERAL: PAINLEVEL: NO PAIN (0)

## 2023-10-26 NOTE — TELEPHONE ENCOUNTER
Prior Authorization Approval    Medication: FREESTYLE RICH 14 DAY READER MAGDALENO  Authorization Effective Date: 9/26/2023  Authorization Expiration Date: 10/25/2026  Approved Dose/Quantity: 1 each per 365 days  Reference #: [id number]   Insurance Company: [employer] Non-Specialty PA's - Phone [phone] Fax [phone]  Expected CoPay: $ 13.56  CoPay Card Available:      Financial Assistance Needed: No  Which Pharmacy is filling the prescription: [facility] [address]  Pharmacy Notified: Yes  Patient Notified: Yes        Thank you,     Eugene Ferreira [facility]  Pharmacy Clinic [facility]  [email or web address]   Phone: [phone]  Fax: [phone]

## 2023-10-26 NOTE — LETTER
10/26/2023       RE: Cyndy Wang  [address]  [address]  [place] [address]     Dear Colleague,    Thank you for referring your patient, Cyndy Wang, to the [facility] ENDOCRINOLOGY [facility]IN[facility] [place] at [facility]. Please see a copy of my visit note below.    Outcome for 10/23/23 3:57 PM :Call [facility] for BG data - [phone]   Fabienne Del Real  Outcome for 10/24/23 4:09 PM :Called [facility] at [phone] for BG data and MAR. They will fax them to our clinic.  Fabienne Del Real            Endocrinology Clinic     Cyndy Wang MRN:[id number] YOB: 1950  Primary care provider: La Anderson     Medical Decision Making:   Diabetes Mellitus Type 2 : not at goal  Complication: neuropathy+ve, microalbuminuria, HLD  A1c today is 9% (improved from close to 11% last visit), goal is close to 8%    Glucose Monitoring Information:  Patient monitors blood sugars at home using glucometer  Interpretation for the past 2 weeks:   FBG 200s  Premeal and bedtime 200-300s  Interpretation 1-2 months ago:   FBG 140s  Premeal and bedtime 160-200s    CGM is not covered by medicare for pts in nursing homes   kg  Cpeptide 9.5 in 2023     She is being treated with:  -lantus 52 units bid  -novolog 14 units tidAC  -novolog correction 3u/50 mg >150 tidAC  -ozempic 1 mg weekly - ran out due to shortage since 10/14/2023  -nurse administers it in the nursing home     Metformin causes diarrhea, vomiting  Pt reported had UTI 6-8x/yearly and so not prescribing SGLT2 inhb.     DM Screening Needed Today  -foot exam (done)     #Hypothyroidism  TSH 4.73   Take levothyroxine 224 mcg, first thing in the morning, with water, 30 minutes prior to breakfast, not taking any multivitamins     PLAN:  Increase ozempic to 2 mg weekly. Hopefully the patient can get the prescription since ozempic 2 mg pen is not in shortage. Discussed with the pt  "if she could not get the ozempic 2 mg, to call me. I will likely prescribe mounjaro for her.  Continue rest of regimen for now.  During next appointment, need to discuss about CGM since the pt will be moving to assisted living, and they would allow the use of CGM there.    Return in about 3 months (around 1/26/2024).    History of Present Illness:   Cyndy Wang a 73 year old is here for follow up of T2dm and thyroid abnormalities.      Interval Events/DM Concerns:  Last ozempic was 10/14/2023. Pharmacy does not have supply for the ozempic    A1c:  Lab Results   Component Value Date    A1C 10.6 01/26/2023    A1C 10.9 10/20/2022    A1C 12.0 03/04/2021    A1C 12.6 05/20/2020       Weight:  Wt Readings from Last 2 Encounters:   10/26/23 109.8 kg (242 lb)   04/27/23 111.6 kg (246 lb)       She is being treated with:  -levothyroxine 224 mcg daily  -lantus 52 units bid  -novolog 14 units tidAC  -novolog correction 3u/50 mg >150 tidAC  -ozempic 1 mg weekly - ran out due to shortage since 10/14/2023  -nurse administers it in the nursing home     Metformin causes diarrhea, vomiting  Pt reported had UTI 6-8x/yearly.     She eats 3 times/day    Glucose Monitoring Information:  Patient monitors blood sugars at home using glucometer  Interpretation for the past 2 weeks:   FBG 200s  Premeal and bedtime 200-300s  Interpretation 1-2 months ago:   FBG 140s  Premeal and bedtime 160-200s    History of diabetes:    Per review of records, she has had diabetes since >2 years ago.     Diabetes Complication Screening:  -Hypoglycemia: -ve. has hypoglycemia awareness.  -Retinopathy: -ve. Last Eye Exam: 1 year ago. Patient receives eye care at NH.  -Nephropathy: + microalbuminuria   Lab Results   Component Value Date    CR 1.09 01/26/2023    CR 1.08 10/20/2022    CR 1.23 03/04/2021    CR 0.83 05/20/2020     No results found for: \"MICROALBUMIN\"  Lab Results   Component Value Date    MICROL 2,063.0 04/27/2023    MICROL 869 03/04/2021    " MICROL 1,700 01/17/2019     -Neuropathy: +ve  -BP: well controlled  -ASCVD: On pravastatin 20 mg, not on her medication list     ROS:  All 12 systems were reviewed and negative except as mentioned in HPI    Past Medical/Surgical History:  Past Medical History:   Diagnosis Date    Diabetes (H)     Hypertension     Obese     Thyroid disease     Type 2 diabetes mellitus with other diabetic kidney complication (H) 10/24/2015     Past Surgical History:   Procedure Laterality Date    REPAIR TENDON FINGER(S) Right 5/22/2015    Procedure: REPAIR TENDON FINGER(S);  Surgeon: Xavier Paulson MD;  Location: [facility] OR       Allergies:  Allergies   Allergen Reactions    Sulfa Antibiotics Difficulty breathing    Lisinopril Cough    Pcn [Penicillins] Difficulty breathing       Current meds:   Current Outpatient Medications   Medication    acetaminophen (TYLENOL) 500 MG tablet    ammonium lactate (LAC-HYDRIN) 12 % external cream    aspirin (ASA) 81 MG chewable tablet    ASPIRIN LOW DOSE 81 MG EC tablet    blood glucose (NO BRAND SPECIFIED) lancets standard    blood glucose (NO BRAND SPECIFIED) test strip    blood glucose monitoring (NO BRAND SPECIFIED) meter device kit    chlorhexidine (PERIDEX) 0.12 % solution    ciprofloxacin (CIPRO) 250 MG tablet    ciprofloxacin (CIPRO) 500 MG tablet    Continuous Blood Gluc  (FREESTYLE RICH 14 DAY READER) MAGDALENO    Continuous Blood Gluc Sensor (FREESTYLE RICH 14 DAY SENSOR) MISC    fluticasone (FLONASE) 50 MCG/ACT nasal spray    Insulin Aspart FlexPen 100 UNIT/ML SOPN    insulin glargine (LANTUS SOLOSTAR) 100 UNIT/ML pen    insulin pen needle (31G X 5 MM) 31G X 5 MM miscellaneous    ipratropium - albuterol 0.5 mg/2.5 mg/3 mL (DUONEB) 0.5-2.5 (3) MG/3ML neb solution    levothyroxine (SYNTHROID/LEVOTHROID) 112 MCG tablet    losartan (COZAAR) 50 MG tablet    metoprolol tartrate (LOPRESSOR) 25 MG tablet    morphine sulfate (ROXANOL) 20 mg/mL (HIGH CONC) soln    nitroFURantoin  macrocrystal-monohydrate (MACROBID) 100 MG capsule    NOVOLOG FLEXPEN 100 UNIT/ML soln    NOVOLOG VIAL 100 UNIT/ML soln    NYSTOP 174250 UNIT/GM powder    OneTouch Delica Lancets 33G MISC    pantoprazole (PROTONIX) 20 MG EC tablet    pravastatin (PRAVACHOL) 20 MG tablet    pravastatin (PRAVACHOL) 20 MG tablet    ROBAFEN 100 MG/5ML liquid    semaglutide (OZEMPIC) 2 MG/3ML pen    Semaglutide, 1 MG/DOSE, (OZEMPIC) 4 MG/3ML pen    venlafaxine (EFFEXOR-XR) 75 MG 24 hr capsule    topiramate (TOPAMAX) 25 MG tablet     No current facility-administered medications for this visit.       Family History:  Family History   Problem Relation Age of Onset    Neurologic Disorder Mother         Stroke    Heart Disease Maternal Grandfather     RAD Maternal Grandmother     Breast Cancer No family hx of     Cancer - colorectal No family hx of     Melanoma No family hx of        Social History:  Social History     Tobacco Use    Smoking status: Former     Types: Cigarettes    Smokeless tobacco: Never    Tobacco comments:     1-2 cigarettes occasionally   Substance Use Topics    Alcohol use: No     Alcohol/week: 0.0 standard drinks of alcohol         Pertinent Physical Exam:   /70 (BP Location: Left arm, Patient Position: Sitting, Cuff Size: Adult Regular)   Pulse 87   Wt 109.8 kg (242 lb)   SpO2 96%   BMI 36.80 kg/m    General: pleasant, NAD, on wheel chair  HEENT: normocephalic, atraumatic. Oral mucous membranes moist.   CV: RRR, HR 87  Lungs: unlabored respiration, no cough  ABD: rounded, nondistended  Skin: warm and dry, no obvious lesions  MSK:  moves all extremities  Lymp:  no LE edema   Mental status:  alert, oriented to self, place, time  Neuro: cranial nerve grossly intact  Psych:  calm and appropriate interaction   Foot exam: edema+, no ulcer noted, mild pulse palpable on both tibial and DP site, monofilament 5/8 sites  Pertinent Labs and Imaging:        Discussed with Attending  Kin Ortiz MD  Endocrine  Fellow  Pager # [phone]      I have seen and examined the patient, reviewed and edited the fellow's note, and agree with the plan of care.  TERRELL Elias

## 2023-10-26 NOTE — TELEPHONE ENCOUNTER
PA Initiation    Medication: FREESTYLE RICH 14 DAY READER MAGDALENO  Insurance Company: [employer] Non-Specialty PA's - Phone [phone] Fax [phone]  Pharmacy Filling the Rx: [facility] [place], [place] - [address]  Filling Pharmacy Phone: [phone]  Filling Pharmacy Fax: [phone]  Start Date: 10/26/2023     Thank you,     Eugene Ferreira [facility]  Pharmacy Clinic [facility]  [email or web address]   Phone: [phone]  Fax: [phone]

## 2023-10-26 NOTE — NURSING NOTE
Chief Complaint   Patient presents with    Diabetes     Ran out of ozempic last week. Pt wondering what she can do because of ozempic being backordered. Patient would like a OneTouch kit today.     Blood pressure 104/70, pulse 87, weight 109.8 kg (242 lb), SpO2 96%, not currently breastfeeding.    Fabienne Del Real

## 2023-10-27 ENCOUNTER — TELEPHONE (OUTPATIENT)
Dept: FAMILY MEDICINE | Facility: CLINIC | Age: 73
End: 2023-10-27
Payer: COMMERCIAL

## 2023-10-27 DIAGNOSIS — E11.29 TYPE 2 DIABETES MELLITUS WITH OTHER DIABETIC KIDNEY COMPLICATION (H): ICD-10-CM

## 2023-12-13 ENCOUNTER — OFFICE VISIT (OUTPATIENT)
Dept: FAMILY MEDICINE | Facility: CLINIC | Age: 73
End: 2023-12-13
Payer: COMMERCIAL

## 2023-12-13 ENCOUNTER — TELEPHONE (OUTPATIENT)
Dept: FAMILY MEDICINE | Facility: CLINIC | Age: 73
End: 2023-12-13

## 2023-12-13 ENCOUNTER — TELEPHONE (OUTPATIENT)
Dept: EDUCATION SERVICES | Facility: CLINIC | Age: 73
End: 2023-12-13

## 2023-12-13 VITALS
OXYGEN SATURATION: 95 % | TEMPERATURE: 98.3 F | SYSTOLIC BLOOD PRESSURE: 124 MMHG | RESPIRATION RATE: 20 BRPM | HEART RATE: 114 BPM | DIASTOLIC BLOOD PRESSURE: 80 MMHG

## 2023-12-13 DIAGNOSIS — Z12.11 SCREEN FOR COLON CANCER: ICD-10-CM

## 2023-12-13 DIAGNOSIS — Z11.59 NEED FOR HEPATITIS C SCREENING TEST: ICD-10-CM

## 2023-12-13 DIAGNOSIS — F33.1 MAJOR DEPRESSIVE DISORDER, RECURRENT EPISODE, MODERATE (H): ICD-10-CM

## 2023-12-13 DIAGNOSIS — E11.29 TYPE 2 DIABETES MELLITUS WITH OTHER DIABETIC KIDNEY COMPLICATION (H): Primary | ICD-10-CM

## 2023-12-13 DIAGNOSIS — N18.31 CHRONIC KIDNEY DISEASE, STAGE 3A (H): ICD-10-CM

## 2023-12-13 DIAGNOSIS — R01.1 UNDIAGNOSED CARDIAC MURMURS: ICD-10-CM

## 2023-12-13 DIAGNOSIS — G35 MS (MULTIPLE SCLEROSIS) (H): ICD-10-CM

## 2023-12-13 DIAGNOSIS — E55.9 VITAMIN D DEFICIENCY DISEASE: ICD-10-CM

## 2023-12-13 DIAGNOSIS — Z86.16 HISTORY OF COVID-19: ICD-10-CM

## 2023-12-13 DIAGNOSIS — Z12.31 VISIT FOR SCREENING MAMMOGRAM: ICD-10-CM

## 2023-12-13 DIAGNOSIS — M25.50 MULTIPLE JOINT PAIN: ICD-10-CM

## 2023-12-13 LAB
ANION GAP SERPL CALCULATED.3IONS-SCNC: 13 MMOL/L (ref 7–15)
BUN SERPL-MCNC: 25.7 MG/DL (ref 8–23)
CALCIUM SERPL-MCNC: 9.4 MG/DL (ref 8.8–10.2)
CHLORIDE SERPL-SCNC: 97 MMOL/L (ref 98–107)
CREAT SERPL-MCNC: 1.11 MG/DL (ref 0.51–0.95)
DEPRECATED HCO3 PLAS-SCNC: 20 MMOL/L (ref 22–29)
EGFRCR SERPLBLD CKD-EPI 2021: 52 ML/MIN/1.73M2
GLUCOSE SERPL-MCNC: 336 MG/DL (ref 70–99)
HBA1C MFR BLD: 10.5 % (ref 0–5.6)
HCV AB SERPL QL IA: NONREACTIVE
POTASSIUM SERPL-SCNC: 5 MMOL/L (ref 3.4–5.3)
SODIUM SERPL-SCNC: 130 MMOL/L (ref 135–145)

## 2023-12-13 PROCEDURE — 36415 COLL VENOUS BLD VENIPUNCTURE: CPT | Performed by: FAMILY MEDICINE

## 2023-12-13 PROCEDURE — 99214 OFFICE O/P EST MOD 30 MIN: CPT | Performed by: FAMILY MEDICINE

## 2023-12-13 PROCEDURE — 83036 HEMOGLOBIN GLYCOSYLATED A1C: CPT | Performed by: FAMILY MEDICINE

## 2023-12-13 PROCEDURE — 86803 HEPATITIS C AB TEST: CPT | Performed by: FAMILY MEDICINE

## 2023-12-13 PROCEDURE — 80048 BASIC METABOLIC PNL TOTAL CA: CPT | Performed by: FAMILY MEDICINE

## 2023-12-13 RX ORDER — ACETAMINOPHEN 325 MG/1
325-650 TABLET ORAL EVERY 6 HOURS PRN
Qty: 60 TABLET | Refills: 1 | Status: SHIPPED | OUTPATIENT
Start: 2023-12-13

## 2023-12-13 ASSESSMENT — PAIN SCALES - GENERAL: PAINLEVEL: MODERATE PAIN (5)

## 2023-12-13 NOTE — PATIENT INSTRUCTIONS
You will be contacted in 1-2 days for results of your lab tests.    You may get the Shingrix vaccine for shingles if you desire, and after you verify with insurance how they cover the vaccine.     Get RSV and tetanus booster vaccine from the pharmacy for better medicare coverage.    To schedule the mammogram, call [phone].     Colonoscopy  will call you in the next 3-5 business days to set up appointment for the procedure. Referral information is in your visit summary also.     You have prescription for the Freestyle Lynette system at the pharmacy.  Try to see if they can dispense it to you now.    Schedule echocardiogram to assess the murmur heard in your heart today.  Contact Cardiac Services  at [phone], to schedule this appointment.

## 2023-12-13 NOTE — PROGRESS NOTES
Assessment & Plan     Patient presented to clinic but did not know why she was scheduled for an appointment.  CMA called AL office and they said they will fax orders to review - had not received it by end of encounter.    Type 2 diabetes mellitus with other diabetic kidney complication (H)  Reviewed needed surveillance labs. Patient agreed to have these measured today.  She denies need for medication refills.  She requested CGM - said medicare will pay if she is in [place]. Reviewed her chart - she has Rxs at the pharmacy - whe will go there to request the CGM.  - Basic metabolic panel  - Hemoglobin A1c  - Continuous Blood Gluc Sensor (FREESTYLE RICH 2 SENSOR) [facility]  Dispense: 6 each; Refill: 3  - Hemoglobin A1c  - Basic metabolic panel    Undiagnosed cardiac murmurs  Patient said she did not have this before her covid19 illness this year.   Reviewed chart, no recent echo visible but had echo in 2015 showing no valve abnormalities.. Patient said she does not recall having had any cardiac imaging at the NH where she was at.  She denies acute dyspnea or chest pain though today.  She agreed to have echo done. Refer to cardiology as appropriate.  - Echocardiogram Complete    Multiple joint pain  Patient said she used to have only low backpain but recently has had intermittent upper extremity joint pains if she moves a lot.  Advised safe use of acetaminophen.  - acetaminophen (TYLENOL) 325 MG tablet  Dispense: 60 tablet; Refill: 1    Need for hepatitis C screening test  Offered screening based on current recommendations. Patient concurred to screen.   - Hepatitis C Screen Reflex to HCV RNA Quant and Genotype  - Hepatitis C Screen Reflex to HCV RNA Quant and Genotype    Visit for screening mammogram  Patient wa apprehensive but eventually did say she will try to get this scheduled in early 2024.  - MA SCREENING DIGITAL BILAT - Future  (s+30)    Screen for colon cancer  Patient is unsure if she will pursue but she preferred  to have the order placed.   - Colonoscopy Screening  Referral    Chronic kidney disease, stage 3a (H)  BMP repeated today. Waiting for results.    History of COVID-19  Patient said she has just not recovered her baseline condition prior to illness from earlier this year.  Echo to evaluate the new murmur she said started after covid19 illness.  - Echocardiogram Complete    MS (multiple sclerosis) (H)  Denies flare per patient.     Major depressive disorder, recurrent episode, moderate (H)  Patient reports no depressed mood at this time.     MED REC REQUIRED  Post Medication Reconciliation Status: unable to reconcile discharge medications due to unavailable AL med list  Patient Instructions   You will be contacted in 1-2 days for results of your lab tests.    You may get the Shingrix vaccine for shingles if you desire, and after you verify with insurance how they cover the vaccine.     Get RSV and tetanus booster vaccine from the pharmacy for better medicare coverage.    To schedule the mammogram, call [phone].     Colonoscopy  will call you in the next 3-5 business days to set up appointment for the procedure. Referral information is in your visit summary also.     You have prescription for the Freestyle Lynette system at the pharmacy.  Try to see if they can dispense it to you now.    Schedule echocardiogram to assess the murmur heard in your heart today.  Contact Cardiac Services  at [phone], to schedule this appointment.     Yasmany Dennis MD  [facility]    Florence Naylor is a 73 year old, presenting for the following health issues:  Consult (Moved from nursing home to assisted living yesterday, [facility]. )        12/13/2023     9:29 AM   Additional Questions   Roomed by Zoila TRUJILLO   Accompanied by self       HPI     Chief Complaint   Patient presents with    Consult     Moved from nursing home to assisted living yesterday, [facility].           Patient is unsure why she was scheduled for this appointment.  She is transferring to assisted living (AL) after she stayed at the nursing home after recovering from COVID19 illness.  Patient is unaware any specific needs at the AL facility.  She denies needing any refills except for wanting to get a CGM.    She is due for diabetes surveillance blood tests.    She is due for mammogram and colon cancer screening which she has deferred in the past in spite of orders being placed.    She reports multiple joint pains (back, elbows, wrists, hands) at various times, but particularly if she moves a lot.  She requests Rx for acetaminophen which she takes prn.    Patient Active Problem List   Diagnosis    MS (multiple sclerosis) (H)    Hyperlipidemia LDL goal <100    Vitamin D deficiency disease    Health Care Home    Advanced directives, counseling/discussion    Generalized anxiety disorder    Type 2 diabetes mellitus with other diabetic kidney complication (H)    Obesity due to excess calories, unspecified obesity severity    Binge eating disorder    Hypertension, goal below 140/90    Hypothyroidism, unspecified type    Major depressive disorder, recurrent episode, moderate (H)    Chronic kidney disease, stage 3a (H)     Past Surgical History:   Procedure Laterality Date    REPAIR TENDON FINGER(S) Right 5/22/2015    Procedure: REPAIR TENDON FINGER(S);  Surgeon: Xavier Paulson MD;  Location: [facility] OR       Social History     Tobacco Use    Smoking status: Former     Types: Cigarettes    Smokeless tobacco: Never    Tobacco comments:     1-2 cigarettes occasionally   Substance Use Topics    Alcohol use: No     Alcohol/week: 0.0 standard drinks of alcohol     Family History   Problem Relation Age of Onset    Neurologic Disorder Mother         Stroke    Heart Disease Maternal Grandfather     C.A.D. Maternal Grandmother     Breast Cancer No family hx of     Cancer - colorectal No family hx of     Melanoma No family hx  of          Current Outpatient Medications   Medication Sig Dispense Refill    acetaminophen (TYLENOL) 325 MG tablet Take 1-2 tablets (325-650 mg) by mouth every 6 hours as needed for mild pain 60 tablet 1    acetaminophen (TYLENOL) 500 MG tablet Take 500-1,000 mg by mouth every 6 hours as needed for mild pain 1,000 mg every 4 hours as needed      ammonium lactate (LAC-HYDRIN) 12 % external cream Apply topically 2 times daily as needed for dry skin 385 g 3    ASPIRIN LOW DOSE 81 MG EC tablet       blood glucose (NO BRAND SPECIFIED) lancets standard 1 each by In Vitro route 3 times daily Use to test blood sugar daily whatever brand her insurance will pay for 100 each 1    blood glucose (NO BRAND SPECIFIED) test strip 1 strip by In Vitro route 3 times daily Use to test blood sugars daily. What ever her insurance will pay for 100 strip 1    blood glucose monitoring (NO BRAND SPECIFIED) meter device kit by In Vitro route 3 times daily Use to test blood sugar daily. What ever her insurance will pay for 1 kit 0    chlorhexidine (PERIDEX) 0.12 % solution 15 mLs 2 times daily      Continuous Blood Gluc Sensor (FREESTYLE RICH 2 SENSOR) MISC Change every 14 days. 6 each 3    fluticasone (FLONASE) 50 MCG/ACT nasal spray       Insulin Aspart FlexPen 100 UNIT/ML SOPN       insulin glargine (LANTUS SOLOSTAR) 100 UNIT/ML pen Inject 52 Units Subcutaneous 2 times daily 30 mL 11    insulin pen needle (31G X 5 MM) 31G X 5 MM miscellaneous Use 2 pen needles daily or as directed. 30 each 3    levothyroxine (SYNTHROID/LEVOTHROID) 112 MCG tablet Take 2 tablets (224 mcg) by mouth daily 180 tablet 3    losartan (COZAAR) 50 MG tablet TAKE ONE TABLET BY MOUTH ONCE DAILY 90 tablet 3    metoprolol tartrate (LOPRESSOR) 25 MG tablet       NOVOLOG FLEXPEN 100 UNIT/ML soln Take 12 units with each meal and Take novolog 3 units per 50 mg/dl above 150 mg/dl before meals and bedtime For BG > 150 mg/dl before meals and bedtime, 151-200- 3 units;201-  250- 6 units;251- 300- 9 units;301-350 - 12 units; 351- 400 - 15 units, Call provider if BG> 400 mg/dl 30 mL 11    OneTouch Delica Lancets 33G MISC 1 Box 3 times daily To check BG 3 times per day 100 each 11    pantoprazole (PROTONIX) 20 MG EC tablet       pravastatin (PRAVACHOL) 20 MG tablet Take 1 tablet (20 mg) by mouth daily 90 tablet 3    ROBAFEN 100 MG/5ML liquid       venlafaxine (EFFEXOR-XR) 75 MG 24 hr capsule TAKE THREE CAPSULES BY MOUTH ONCE DAILY 270 capsule 3    aspirin (ASA) 81 MG chewable tablet Take 1 tablet (81 mg) by mouth daily (Patient not taking: Reported on 12/13/2023)      ciprofloxacin (CIPRO) 250 MG tablet  (Patient not taking: Reported on 12/13/2023)      ciprofloxacin (CIPRO) 500 MG tablet  (Patient not taking: Reported on 12/13/2023)      Continuous Blood Gluc  (FREESTYLE RICH 14 DAY READER) MAGDALENO 1 Device continuous To use per manufactures directions (Patient not taking: Reported on 12/13/2023) 1 each 1    ipratropium - albuterol 0.5 mg/2.5 mg/3 mL (DUONEB) 0.5-2.5 (3) MG/3ML neb solution  (Patient not taking: Reported on 12/13/2023)      morphine sulfate (ROXANOL) 20 mg/mL (HIGH CONC) soln  (Patient not taking: Reported on 12/13/2023)      nitroFURantoin macrocrystal-monohydrate (MACROBID) 100 MG capsule  (Patient not taking: Reported on 12/13/2023)      NOVOLOG VIAL 100 UNIT/ML soln       NYSTOP [id number] UNIT/GM powder  (Patient not taking: Reported on 12/13/2023)      pravastatin (PRAVACHOL) 20 MG tablet Take 1 tablet (20 mg) by mouth daily 90 tablet 3    Semaglutide, 1 MG/DOSE, (OZEMPIC) 4 MG/3ML pen Inject 1 mg Subcutaneous every 7 days 3 mL 0    Semaglutide, 2 MG/DOSE, (OZEMPIC) 8 MG/3ML pen Inject 2 mg Subcutaneous every 7 days 9 mL 3    topiramate (TOPAMAX) 25 MG tablet Take 1 tablet (25 mg) by mouth At Bedtime for 7 days, THEN 1 tablet (25 mg) 2 times daily for 7 days, THEN 2 tablets (50 mg) 2 times daily. 120 tablet 6     Allergies   Allergen Reactions    Sulfa  Antibiotics Difficulty breathing    Lisinopril Cough    Pcn [Penicillins] Difficulty breathing          Review of Systems   Constitutional, HEENT, cardiovascular, pulmonary, GI, , musculoskeletal, neuro, skin, endocrine and psych systems are negative, except as otherwise noted.      Objective    /80 (BP Location: Right arm, Patient Position: Chair, Cuff Size: Adult Large)   Pulse 114   Temp 98.3  F (36.8  C) (Tympanic)   Resp 20   SpO2 95%   There is no height or weight on file to calculate BMI.  Physical Exam   GENERAL: alert and no distress, wheelchair-bound at time of exam  NECK: no tenderness, no adenopathy,  Thyroid not enlarged  RESP: lungs clear to auscultation - no rales, no rhonchi, no wheezes  CV: regular rates and rhythm, no murmur  MS: trace bipedal pitting edema; no calf tenderness; full range of motion  with no pain today.  SKIN: no suspicious lesions, no rashes      Results for orders placed or performed in visit on 12/13/23   Hemoglobin A1c     Status: Abnormal   Result Value Ref Range    Hemoglobin A1C 10.5 (H) 0.0 - 5.6 %

## 2023-12-13 NOTE — TELEPHONE ENCOUNTER
Dr. Ortiz,    Would you be willing to place a diabetes education referral for pt. Pt's [facility] pharmacy called the diabetes educators today because pt is requesting help getting started on the freestyle lynette 2 system. She picked-up sensors today, but it appears she doesn't have a reader. The 14-day lynette reader was ordered in October, but never picked up. I did let the pharmacy know that the 14 day system is being discontinued and pt will need a Lynette 2 reader to go with her Lynette 2 sensors. The pharmacy is getting this ready for patient. Our team would be happy to help pt get started on the lynette 2 system and provide further diabetes education, but we do need a new referral.    Once the referral is placed our schedulers will reach out to her.    Thanks!    Apryl Alamo RN, [facility]

## 2023-12-13 NOTE — TELEPHONE ENCOUNTER
General Call      Reason for Call:  [facility] called to confirm that Dr Dennis would be willing to follow pt, sign orders for home care services, future plan of care. Please call secured line with any questions [phone]      Date of last appointment with provider: 12/13    Could we send this information to you in [place] or would you prefer to receive a phone call?:   Patient would prefer a phone call   Okay to leave a detailed message?: Yes at Home number on file [phone] ([place])

## 2023-12-14 ENCOUNTER — TELEPHONE (OUTPATIENT)
Dept: FAMILY MEDICINE | Facility: CLINIC | Age: 73
End: 2023-12-14
Payer: COMMERCIAL

## 2023-12-14 NOTE — TELEPHONE ENCOUNTER
General Call      Reason for Call:  FYI: pt had a fall this am, paramedics were called, pt was assessed but not taken to ER.  Pt doing well at this time.      Jody Collado on 12/14/2023 at 1:42 PM

## 2023-12-15 NOTE — TELEPHONE ENCOUNTER
Covering for PCP.  Noted that patient had a fall but assessment did not necessitate patient be brought to ER.  Keep for PCp to review.

## 2023-12-15 NOTE — TELEPHONE ENCOUNTER
Yes I can follow patient, but if they have immediate needs contact another provider until I return 1/8/24.    La Anderson, DNP

## 2023-12-16 ENCOUNTER — MEDICAL CORRESPONDENCE (OUTPATIENT)
Dept: HEALTH INFORMATION MANAGEMENT | Facility: CLINIC | Age: 73
End: 2023-12-16

## 2024-02-06 PROBLEM — N39.46 MIXED INCONTINENCE: Status: ACTIVE | Noted: 2024-02-06

## 2024-02-08 ENCOUNTER — MEDICAL CORRESPONDENCE (OUTPATIENT)
Dept: HEALTH INFORMATION MANAGEMENT | Facility: CLINIC | Age: 74
End: 2024-02-08

## 2024-02-14 ENCOUNTER — TELEPHONE (OUTPATIENT)
Dept: FAMILY MEDICINE | Facility: CLINIC | Age: 74
End: 2024-02-14
Payer: COMMERCIAL

## 2024-02-14 NOTE — TELEPHONE ENCOUNTER
Home Health Care    Reason for call:  Needing verbal orders for skilled nursing to assist with medication management and diabetes education.        Phone Number Homecare Nurse can be reached at: [phone]  Drea Collado on 2/14/2024 at 11:04 AM

## 2024-02-14 NOTE — TELEPHONE ENCOUNTER
Home Care is calling regarding an established patient with [facility].       Requesting orders from: La Anderson, last office visit 12/13/23 with Dr Dennis  Provider is following patient: Yes  Is this a 60-day recertification request?  No    Orders Requested    Skilled Nursing  Request for initial evaluation and treatment (one time)   For assistance with medication management and diabetes education.   Patient currently has services through [facility] for PT and OT     Verbal orders given.  Home Care will send orders for provider to sign.    Jodee West RN

## 2024-04-30 ENCOUNTER — TELEPHONE (OUTPATIENT)
Dept: FAMILY MEDICINE | Facility: CLINIC | Age: 74
End: 2024-04-30
Payer: COMMERCIAL

## 2024-04-30 NOTE — TELEPHONE ENCOUNTER
Patient Quality Outreach    Patient is due for the following:   Colon Cancer Screening  Physical Annual Wellness Visit    Next Steps:   Schedule a Annual Wellness Visit    Type of outreach:    Sent letter.      Questions for provider review:    None           Diamond Pastor MA

## 2024-04-30 NOTE — LETTER
April 30, 2024    To  Cyndy MURPHY Wang  [address]    [place] [address]    Your team at [facility] cares about your health. We have reviewed your chart and based on our findings; we are making the following recommendations to better manage your health.     You are in particular need of attention regarding the following:     Call or MyChart message your clinic to schedule a colonoscopy, schedule/ a FIT Test, or order a Cologuard test. If you are unsure what type of test you need, please call your clinic and speak to clinic staff.   Colon cancer is now the second leading cause of cancer-related deaths in the [facility] for both men and women and there are over 130,000 new cases and 50,000 deaths per year from colon cancer. Colonoscopies can prevent 90-95% of these deaths. Problem lesions can be removed before they ever become cancer. This test is not only looking for cancer, but also getting rid of precancerous lesions.   If you are under/uninsured, we recommend you contact the [employer] Program.[employer] is a free colorectal cancer screening program that provides colonoscopies for eligible under/uninsured [place] men and women. If you are interested in receiving a free colonoscopy, please call [employer] at t [phone] (mention code [employer]) to see if you're eligible. Please have them send us the results.   PREVENTATIVE VISIT: Annual Medicare Wellness:Schedule an Annual Medicare Wellness Exam. Please call your [facility] clinic to set up your appointment.  Please call [phone] to schedule a colonoscopy.    If you have already completed these items, please contact the clinic via phone or   [employer] so your care team can review and update your records. Thank you for   choosing [facility] for your healthcare needs. For any questions,   concerns, or to schedule an appointment please contact our clinic.    Healthy Regards,      Your [facility] Care  Team

## 2024-07-06 ENCOUNTER — HEALTH MAINTENANCE LETTER (OUTPATIENT)
Age: 74
End: 2024-07-06

## 2024-07-12 NOTE — MR AVS SNAPSHOT
Nephrology progress note    Subjective:   Overnight events noted.   S/p trach 07/11  S/p HD -- extra treatment for hyperkalemia on 07/11  HD planned for later today on regular schedule   40% fio2.         ASSESSMENT and PLAN:    End-stage renal disease on hemodialysis every Monday Wednesday Friday. Extra HD yesterday for hyperkalemia, regular ( longer treatment 4 hrs ) today.      Hyperkalemia: K 4.9 this am, HD today.      AVF: Good BFR during HD, no physical access issues. Vascular Access Duplex on 07/08/24 with venous outflow intraluminal thrombus. If continues to have poor clearance inspite of longer treatment, may need fistulogram to further assess AVF.     Hypertension    Osteomyelitis discitis and moderate to severe spinal canal narrowing with phlegmon and abscess and spinal cord compression quadriplegia status post cervical fusion-- s/p cord decompression    Mineral and bone disease-- monitor phos level --  higher today, Increase dose of phos binders and hold tube feeds for 30 minutes after giving renvela for 30 minutes    Anemia of chronic disease: hb 7.1 g/dl. Transfuse if hb < 7 , c/w epo       Thank you for allowing me to participate in his care      HISTORY OF PRESENT ILLNESS:    Otha J Essex is a 71 year old male seen today for end-stage renal disease evaluation management, has a history of end-stage renal disease hypertension diabetes, diabetic nephropathy, presented to the hospital abdominal pain MRI spine without and without contrast showed T8-9 osteomyelitis discitis with dorsal epidural fluid collection concerning for abscess, he eventually became hypotensive and was intubated for airway protection his blood cultures at an outside hospital were positive for MSSA    HISTORIES:  ALLERGIES:  No Known Allergies  Current Facility-Administered Medications   Medication Dose Route Frequency Provider Last Rate Last Admin    midodrine (PROAMATINE) tablet 10 mg  10 mg Per NG Tube 4 times per day Jeremy  "              After Visit Summary   5/4/2017    Cyndy Wang    MRN: [id number]           Patient Information     Date Of Birth          1950        Visit Information        Provider Department      5/4/2017 1:40 PM Tammy Zelaya APRN CNP [facility]        Today's Diagnoses     Hematuria    -  1    Type 2 diabetes mellitus with complication, without long-term current use of insulin (H)        Nonspecific finding on examination of urine          Care Instructions    A1C  Electrolytes and kidney function  Potassium was probably low due to high blood sugars  Will check diabetic labs today, will consider starting Glipizide if A1C high       Urine positive for UTI   Start Cipro 1 tablet twice daily for 7 days   Drink enough fluids  Stop drinking soda and pop  Follow diabetic diet (no sweets)         Follow-ups after your visit        Who to contact     If you have questions or need follow up information about today's clinic visit or your schedule please contact [facility] directly at [phone].  Normal or non-critical lab and imaging results will be communicated to you by LEHRhart, letter or phone within 4 business days after the clinic has received the results. If you do not hear from us within 7 days, please contact the clinic through LEHRhart or phone. If you have a critical or abnormal lab result, we will notify you by phone as soon as possible.  Submit refill requests through Evolva or call your pharmacy and they will forward the refill request to us. Please allow 3 business days for your refill to be completed.          Additional Information About Your Visit        Evolva Information     Evolva lets you send messages to your doctor, view your test results, renew your prescriptions, schedule appointments and more. To sign up, go to [email or web address] . Click on \"Log in\" on the left side of the screen, which will take you to the Welcome page. Then click on \"Sign " "up Now\" on the right side of the page.     You will be asked to enter the access code listed below, as well as some personal information. Please follow the directions to create your username and password.     Your access code is: [email or web address]  Expires: 2017  2:59 PM     Your access code will  in 90 days. If you need help or a new code, please call your [facility] clinic or [phone].        Care EveryWhere ID     This is your Care EveryWhere ID. This could be used by other organizations to access your [facility] medical records  [id number]        Your Vitals Were     Pulse Temperature Height BMI (Body Mass Index)          114 97.7  F (36.5  C) (Tympanic) 5' 7\" (1.702 m) 34.64 kg/m2         Blood Pressure from Last 3 Encounters:   17 132/87   17 126/84   16 154/87    Weight from Last 3 Encounters:   17 221 lb 3.2 oz (100.3 kg)   16 224 lb (101.6 kg)   16 208 lb (94.3 kg)              We Performed the Following     Basic metabolic panel     Hemoglobin A1c     Hemoglobin     UA with Microscopic reflex to Culture     Urine Culture Aerobic Bacterial          Today's Medication Changes          These changes are accurate as of: 17  2:59 PM.  If you have any questions, ask your nurse or doctor.               Start taking these medicines.        Dose/Directions    ciprofloxacin 250 MG tablet   Commonly known as:  CIPRO   Used for:  Hematuria   Started by:  Tammy Zelaya APRN CNP        Dose:  250 mg   Take 1 tablet (250 mg) by mouth 2 times daily for 7 days   Quantity:  14 tablet   Refills:  0            Where to get your medicines      These medications were sent to [facility] [place], [place] - [address]  [address] [address]     Phone:  [phone]     ciprofloxacin 250 MG tablet                Primary Care Provider Office Phone # Fax #    La Anderson [phone]       [facility] [address] " Angelo WEBB MD   10 mg at 07/12/24 0530    fentaNYL (SUBLIMAZE) injection 50 mcg  50 mcg Intravenous Q2H PRN Vonda Spence, DO   50 mcg at 07/12/24 0620    HYDROcodone-acetaminophen (NORCO) 5-325 MG per tablet 1 tablet  1 tablet Oral Q4H PRN Vonda Spence, DO   1 tablet at 07/12/24 0621    sodium citrate anticoagulant 4 % flush 3 mL  3 mL Intracatheter PRN Lalita Mcdonnell MD        alteplase (CATHFLO ACTIVASE) injection 2 mg  2 mg Intracatheter PRN Lalita Mcdonnell MD        sodium chloride (NORMAL SALINE) 0.9 % bolus 100-200 mL  100-200 mL Intravenous PRN Lalita Mcdonnell MD        calcium gluconate 1 g in sodium chloride 50 mL IVPB  1 g Intravenous Once Lalita Mcdonnell MD        pantoprazole (PROTONIX INJECT) injection 40 mg  40 mg Intravenous 2 times per day Vonda Spence DO   40 mg at 07/11/24 2052    [START ON 7/17/2024] ceFAZolin (ANCEF) syringe 1,000 mg  1,000 mg Intravenous Daily Homero Arita MD        ipratropium-albuterol (DUONEB) 0.5-2.5 (3) MG/3ML nebulizer solution 3 mL  3 mL Nebulization Q6H Resp Vonda Spence DO   3 mL at 07/12/24 0708    cefepime (MAXIPIME) 1,000 mg in sodium chloride 0.9 % 100 mL IVPB  1,000 mg Intravenous Daily Homero Arita MD   Completed at 07/11/24 1851    sevelamer carbonate (RENVELA) oral packet 1,600 mg  1,600 mg Per NG Tube TID WC Homero Valente MD   1,600 mg at 07/11/24 1743    epoetin darrian-epbx (RETACRIT) 21138 UNIT/ML injection 10,000 Units  10,000 Units Subcutaneous Once per day on Monday Wednesday Friday Homero Valente MD   10,000 Units at 07/10/24 2014    atropine (ISOPTO ATROPINE) 1 % ophthalmic solution 1 drop  1 drop Sublingual 4 times per day Vonda Spence, DO   1 drop at 07/12/24 0530    ipratropium-albuterol (DUONEB) 0.5-2.5 (3) MG/3ML nebulizer solution 3 mL  3 mL Nebulization Q6H Resp PRN Vonda Specne DO   3 mL at 07/10/24 2200    docusate sodium-sennosides (SENOKOT S) 50-8.6 MG 1 tablet  1 tablet Per NG Tube Nightly  [facility] [id number]        Thank you!     Thank you for choosing [facility]  for your care. Our goal is always to provide you with excellent care. Hearing back from our patients is one way we can continue to improve our services. Please take a few minutes to complete the written survey that you may receive in the mail after your visit with us. Thank you!             Your Updated Medication List - Protect others around you: Learn how to safely use, store and throw away your medicines at www.disposemymeds.org.          This list is accurate as of: 5/4/17  2:59 PM.  Always use your most recent med list.                   Brand Name Dispense Instructions for use    ACE NOT PRESCRIBED (INTENTIONAL)     0 each    1 each ACE Inhibitor not prescribed due to Allergy       aspirin 325 MG EC tablet     100 tablet    Take 1 tablet by mouth daily.       blood glucose monitoring test strip    no brand specified    100 strip    by In Vitro route 4 times daily.       ciprofloxacin 250 MG tablet    CIPRO    14 tablet    Take 1 tablet (250 mg) by mouth 2 times daily for 7 days       * insulin pen needle 31G X 5 MM     3 Box    1 Box See Admin Instructions. Use one times a day       * insulin pen needle 31G X 5 MM    B-D U/F    100 each    1 each See Admin Instructions Use once time(s) a day.       levothyroxine 175 MCG tablet    SYNTHROID/LEVOTHROID    90 tablet    Take 1 tablet (175 mcg) by mouth daily       liraglutide 18 MG/3ML soln    VICTOZA    3 Month    Inject 1.8 mg Subcutaneous daily       losartan 50 MG tablet    COZAAR    30 tablet    Take 1 tablet (50 mg) by mouth daily       metFORMIN 500 MG 24 hr tablet    GLUCOPHAGE-XR    60 tablet    Take 1 tablet (500 mg) by mouth 2 times daily (with meals)       * order for DME     1 each    Glucometer, brand as covered by insurance.       * order for DME     400 each    Test strips for pt's glucometer, brand as covered by insurance. Test four times daily  Vonda Spence T, DO   1 tablet at 07/11/24 2052    [Held by provider] amLODIPine (NORVASC) tablet 5 mg  5 mg Oral Daily Rosalee Omer CNP   5 mg at 07/07/24 1236    polyethylene glycol (MIRALAX) packet 17 g  17 g Per NG Tube BID Rosalee Omer CNP   17 g at 07/11/24 2052    heparin (porcine) injection 5,000 Units  5,000 Units Subcutaneous 3 times per day Vonda Spence DO   5,000 Units at 07/10/24 2100    [Held by provider] aspirin (ECOTRIN) enteric coated tablet 81 mg  81 mg Oral Daily Shelly Perla PA-C        sodium citrate anticoagulant 4 % flush 3 mL  3 mL Intracatheter PRN Homero Valente MD        alteplase (CATHFLO ACTIVASE) injection 2 mg  2 mg Intracatheter PRN Homero Valente MD        sodium chloride (NORMAL SALINE) 0.9 % bolus 100-200 mL  100-200 mL Intravenous PRN Homero Valente MD        albumin human (SPA) 25 % injection 12.5 g  12.5 g Intravenous PRN Homero Valente MD        insulin lispro (ADMELOG,HumaLOG) - Correction Dose   Subcutaneous 4 times per day Vonda Spence DO   3 Units at 07/11/24 1253    dextrose (GLUTOSE) 40 % gel 15 g  15 g Per NG Tube PRN Ivonne Spencee T, DO        dextrose (GLUTOSE) 40 % gel 30 g  30 g Per NG Tube PRN Ivonne Spencee T, DO        sodium chloride 0.9 % injection 10 mL  10 mL Injection PRN Ivonne Spencee T, DO        sodium chloride 0.9 % injection 10 mL  10 mL Injection 2 times per day Vonda Spence, DO   10 mL at 07/11/24 2053    sodium chloride 0.9 % flush bag 25 mL  25 mL Intravenous PRN Jayme Banks CNP   Completed at 07/08/24 2105    sodium chloride 0.9 % injection 2 mL  2 mL Intracatheter 2 times per day Jayme Banks CNP   2 mL at 07/11/24 2052    dextrose 50 % injection 25 g  25 g Intravenous PRN Jayme Banks CNP   25 g at 07/11/24 2329    dextrose 50 % injection 12.5 g  12.5 g Intravenous PRN Jayme Banks CNP   12.5 g at 07/12/24 0530    glucagon (GLUCAGEN) injection 1 mg  1 mg Intramuscular PRN Jayme Banks,  CNP        chlorhexidine gluconate (PERIDEX) 0.12 % solution 15 mL  15 mL Swish & Spit 2 times per day Jayme Banks CNP   15 mL at 24    And    chlorhexidine gluconate (PERIDEX) 0.12 % solution 15 mL  15 mL Swish & Spit PRN Jayme Banks CNP         Facility-Administered Medications Ordered in Other Encounters   Medication Dose Route Frequency Provider Last Rate Last Admin    propofol (DIPRIVAN) IV bolus   Intravenous PRN Calfos, Abduljabar   50 mg at 07/10/24 2110    succinylcholine (QUELICIN) injection   Intravenous PRN Calfos, Abduljabar   100 mg at 07/10/24 2111    PHENYLephrine (MAVIS-SYNEPHRINE) 100 MCG/ML injection   Intravenous PRN Calfos, Abduljabar   200 mcg at 07/10/24 2115     Past Medical History:   Diagnosis Date    Branch retinal vein occlusion (CMD)     Cataract     Chronic kidney disease     Diabetes mellitus  (CMD)     Diabetic neuropathy  (CMD)     Essential (primary) hypertension     Hemodialysis access site with arteriovenous graft (CMD)     Nonproliferative diabetic retinopathy  (CMD)     Ocular hypertension     Pseudophakia     Retinal detachment      Past Surgical History:   Procedure Laterality Date    Hand surgery      Knee cartilage surgery Right     Toe amputation       Social History     Tobacco Use   Smoking Status Former    Current packs/day: 0.00    Types: Cigarettes    Quit date:     Years since quittin.5    Passive exposure: Past   Smokeless Tobacco Never     Social History     Substance and Sexual Activity   Alcohol Use Yes    Comment: rarely     Family History   Problem Relation Age of Onset    Kidney disease Mother       No FH of kidney disease    REVIEW OF SYSTEMS:    Limited as patient is intubated and sedated     PHYSICAL EXAM:    Vital Signs:  Visit Vitals  BP (!) 139/36   Pulse 92   Temp 99 °F (37.2 °C) (Oral)   Resp 14   Ht 6' 2\" (1.88 m)   Wt 84.2 kg (185 lb 10 oz)   SpO2 100%   BMI 23.83 kg/m²     Patient intubated sedated  S1-S2  Bilateral air  and prn.       * order for DME     400 each    Lancets.  Four times daily and prn.       pravastatin 20 MG tablet    PRAVACHOL    30 tablet    Take 1 tablet (20 mg) by mouth daily       topiramate 25 MG tablet    TOPAMAX    90 tablet    Take 1 tablet in am and 2 tablets in pm.       venlafaxine 75 MG 24 hr capsule    EFFEXOR-XR    270 capsule    Take 3 capsules (225 mg) daily.       * Notice:  This list has 5 medication(s) that are the same as other medications prescribed for you. Read the directions carefully, and ask your doctor or other care provider to review them with you.       entry  No lower extremity edema    LABORATORY DATA:    Lab Results   Component Value Date    SODIUM 136 07/12/2024    POTASSIUM 4.9 07/12/2024    CHLORIDE 103 07/12/2024    CO2 26 07/12/2024    BUN 92 (H) 07/12/2024    CREATININE 5.88 (H) 07/12/2024    GLUCOSE 71 07/12/2024    WBC 13.1 (H) 07/12/2024    HCT 24.1 (L) 07/12/2024    HGB 7.7 (L) 07/12/2024     07/12/2024     (H) 07/04/2024    GPT 44 07/04/2024    ALKPT 179 (H) 07/04/2024    BILIRUBIN 0.4 07/04/2024      INR (no units)   Date Value   07/11/2024 1.1     Prothrombin Time (no units)   Date Value   01/04/2019 NOT APPLICABLE     TSH (mcUnits/mL)   Date Value   05/22/2024 0.771      Lab Results   Component Value Date    COL Yellow 06/12/2024    UAPP Clear 06/12/2024    USPG 1.020 06/12/2024    UPH 6.5 06/12/2024    UPROT >600 (A) 06/12/2024    UGLU 150 (A) 06/12/2024    UKET Negative 06/12/2024    UBILI Negative 06/12/2024    URBC Small (A) 06/12/2024    UNITR Negative 06/12/2024    UROB 0.2 06/12/2024    UWBC Negative 06/12/2024        IMAGING STUDIES:     LAST ECHO/ECHO STRESS:  No valid procedures specified.    LAST MRI:  === 06/29/24 ===    MRI OUTSIDE STUDY    - Narrative -  This is a study performed at an outside facility with images uploaded to [facility].    ___________________________________________________________________________    MRI OUTSIDE STUDY    - Narrative -  This is a study performed at an outside facility with images uploaded to [facility].    ___________________________________________________________________________    MRI LUMBAR SPINE W WO CONTRAST    - Narrative -  EXAMINATION: Magnetic resonance imaging (MRI) of the lumbar spine without  and with contrast    HISTORY: 71 years Male ESRD DM2 generalized weakness c/f discitis/OM and  compressive myelopathy    TECHNIQUE: MRI of the lumbar spine was performed prior to and following the  uneventful administration of 8 mL Gadavist intravenous gadolinium  contrast  according to standard protocol.    IV contrast administered; however, no post contrast images were performed  as the patient became hypoxic during examination.    COMPARISON: MRI lumbar spine 6/15/2024    FINDINGS:    ALIGNMENT :The alignment is normal.  MARROW:  Vertebral bodies are normal in height without evidence of  compression fractures. Mildly decreased T1 marrow signal secondary to  patient's underlying renal osteodystrophy. Modic type II degenerative  changes along along multiple endplates, most prominently at L1-L2 and L2-L3  endplates. No evidence of bone marrow edema. Bone marrow edema within the  left L3-4 and L4-5 facet joints.  CORD: The conus medullaris terminates at the level of L1 and the distal  spinal cord signal intensity is normal.  DISCS: Disc desiccation at L4-5 and L5-S1.  OTHER: Renal atrophy with small T2 hyperintense cystic lesions bilaterally.  Mild soft tissue anasarca.    L1-L2: No significant spinal canal or neuroforaminal narrowing. Mild facet  arthropathy.    L2-L3: No significant spinal canal or neuroforaminal narrowing. Mild facet  arthropathy.    L3-L4: Mild disc bulge. No significant spinal canal narrowing. Mild  bilateral neuroforaminal narrowing. Mild facet arthropathy.    L4-L5: Mild disc bulge. No significant spinal canal narrowing. Mild  bilateral neuroforaminal narrowing. Moderate facet arthropathy.    L5-S1: Mild disc bulge. No significant spinal canal or neuroforaminal  narrowing. Moderate facet arthropathy.    - Impression -  IV contrast administered; however, no post contrast images were performed  as the patient became hypoxic during examination.    1.   No noncontrast evidence of discitis/osteomyelitis.  2.   Mild degenerative changes of the lumbar spine.  3.   Moderate facet arthropathy at L4-5 and L5-S1 with associated bone  marrow edema, similar from prior MRI 6/15/2024, favor active degenerative  change.        Electronically Signed by: JAY SUTTON  MD [place]  Signed on: 6/30/2024 7:59 AM  Workstation ID: [id number]    LAST CT:  === 06/29/24 ===    CT OUTSIDE STUDY    - Narrative -  This is a study performed at an outside facility with images uploaded to [facility].    ___________________________________________________________________________    CT THORACIC SPINE WO CONTRAST    - Narrative -  EXAMINATION:  1.   Computed tomography (CT) of the cervical spine without contrast  2.   Computed tomography (CT) of the thoracic spine without contrast    HISTORY: 71 years Male osteomyelitis/discitis  PT UNABLE TO RAISE ARMS ABOVE HEAD.    TECHNIQUE: CT of the cervical and thoracic spine was performed without  contrast according to standard protocol.    COMPARISON: MRI cervical/thoracic 6/30/2024    FINDINGS:    Cervical spine:    Reversal the normal cervical lordosis. Severe multilevel disc height loss  with subchondral cyst formation and endplate degenerative changes.  Subchondral cysts and suspected subtle erosive changes along the C7-T1  endplates with prominent Schmorl's node along the superior endplate of the  T1 vertebral body, correlates to area of previously suspected discitis and  osteomyelitis or dialysis related amyloid spondyloarthropathy on MRI  cervical spine. The degree of disc height loss at this level is not as  pronounced at other levels no evidence of acute fracture. Multiple disc  osteophyte complexes with severe spinal canal narrowing at the C4-C5 and  C7-T1 levels, better visualized on prior MRI. Facets are unremarkable.  Atherosclerotic disease of the carotid bifurcations    Thoracic spine:    Erosive/destructive changes along the T8-T9 endplates, most compatible with  discitis osteomyelitis with associated inflammatory changes in the adjacent  soft tissues. Severe spinal canal stenosis at this level is better  visualized on prior MRI. Vertebral body heights are otherwise intact. No  fractures are identified. Diffuse diffuse  sclerotic appearance of the  vertebral body secondary to renal osteodystrophy. No additional levels of  erosive or destructive changes. Mild facet arthropathy. Left-sided pleural  effusion. Atelectatic changes in the lung bases. Renal atrophy. Vascular  calcifications.    - Impression -  1.   Erosive/destructive changes along the T8-T9 endplates, most compatible  with discitis osteomyelitis with associated inflammatory changes in the  adjacent soft tissues. Severe spinal canal stenosis at this level is better  visualized on prior MRI.    2.   Subchondral cysts, suspected subtle erosive changes, and Schmorl's  nodes along the C7-T1 endplates with mild disc height loss in comparison to  adjacent levels. Findings remain suspicious for discitis-osteomyelitis or  dialysis related amyloid spondyloarthropathy versus active degenerative  change.    3.   Mild diffuse sclerotic appearance of the osseous structures secondary  to patient's renal osteodystrophy.    4.   Left-sided pleural effusion.        Electronically Signed by: JAY LOVELACE MD  Signed on: 6/30/2024 2:43 PM  Workstation ID: [id number]    ___________________________________________________________________________    CT CERVICAL SPINE WO CONTRAST    - Narrative -  EXAMINATION:  1.   Computed tomography (CT) of the cervical spine without contrast  2.   Computed tomography (CT) of the thoracic spine without contrast    HISTORY: 71 years Male osteomyelitis/discitis  PT UNABLE TO RAISE ARMS ABOVE HEAD.    TECHNIQUE: CT of the cervical and thoracic spine was performed without  contrast according to standard protocol.    COMPARISON: MRI cervical/thoracic 6/30/2024    FINDINGS:    Cervical spine:    Reversal the normal cervical lordosis. Severe multilevel disc height loss  with subchondral cyst formation and endplate degenerative changes.  Subchondral cysts and suspected subtle erosive changes along the C7-T1  endplates with prominent Schmorl's node along the  superior endplate of the  T1 vertebral body, correlates to area of previously suspected discitis and  osteomyelitis or dialysis related amyloid spondyloarthropathy on MRI  cervical spine. The degree of disc height loss at this level is not as  pronounced at other levels no evidence of acute fracture. Multiple disc  osteophyte complexes with severe spinal canal narrowing at the C4-C5 and  C7-T1 levels, better visualized on prior MRI. Facets are unremarkable.  Atherosclerotic disease of the carotid bifurcations    Thoracic spine:    Erosive/destructive changes along the T8-T9 endplates, most compatible with  discitis osteomyelitis with associated inflammatory changes in the adjacent  soft tissues. Severe spinal canal stenosis at this level is better  visualized on prior MRI. Vertebral body heights are otherwise intact. No  fractures are identified. Diffuse diffuse sclerotic appearance of the  vertebral body secondary to renal osteodystrophy. No additional levels of  erosive or destructive changes. Mild facet arthropathy. Left-sided pleural  effusion. Atelectatic changes in the lung bases. Renal atrophy. Vascular  calcifications.    - Impression -  1.   Erosive/destructive changes along the T8-T9 endplates, most compatible  with discitis osteomyelitis with associated inflammatory changes in the  adjacent soft tissues. Severe spinal canal stenosis at this level is better  visualized on prior MRI.    2.   Subchondral cysts, suspected subtle erosive changes, and Schmorl's  nodes along the C7-T1 endplates with mild disc height loss in comparison to  adjacent levels. Findings remain suspicious for discitis-osteomyelitis or  dialysis related amyloid spondyloarthropathy versus active degenerative  change.    3.   Mild diffuse sclerotic appearance of the osseous structures secondary  to patient's renal osteodystrophy.    4.   Left-sided pleural effusion.        Electronically Signed by: JAY LOVELACE MD  Signed on:  6/30/2024 2:43 PM  Workstation ID: [id number]    LAST EKG:    Encounter Date: 06/29/24   Electrocardiogram 12-Lead   Result Value    Ventricular Rate EKG/Min (BPM) 83    Atrial Rate (BPM) 83    IA-Interval (MSEC) 184    QRS-Interval (MSEC) 102    QT-Interval (MSEC) 340    QTc 399    P Axis (Degrees) -16    R Axis (Degrees) -28    T Axis (Degrees) 62    REPORT TEXT      Normal sinus rhythm  Normal ECG  When compared with ECG of  03-JUL-2024 19:06,  ST no longer depressed in  Lateral leads  Confirmed by CLEMENTINA YOUNGBLOOD, [facility] ([id number]) on 7/11/2024 3:38:27 PM         LAST X-RAY:  === 06/29/24 ===    XR CHEST AP OR PA    - Narrative -  Patient: ESSEX, OTHA  Time Out: 02:57  Exam(s): XR CXR 1 VIEW    EXAM:  XR Chest, 1 View    CLINICAL HISTORY:  Reason for exam: ETT insertion.    TECHNIQUE:  Frontal view of the chest.    COMPARISON:  CXR June 30, 2024.    FINDINGS:  Lungs:  Unremarkable.  No consolidation.  Pleural space:  Small LEFT pleural effusion.  No pneumothorax.  Heart:  Mild cardiomegaly.  Mediastinum:  Unremarkable.  Normal mediastinal contour.  Bones/joints:  Unremarkable.  No acute fracture.  Tubes, lines and devices:  Endotracheal tube terminates in the trachea.  Feeding tube terminates in the stomach.    - Impression -  1.  Endotracheal tube terminates in the trachea.  2.  Feeding tube terminates in the stomach.  3.  Small LEFT pleural effusion.        Electronically signed by Chris Padilla MD on 07 01 24 at 02:57    ___________________________________________________________________________    XRAY OUTSIDE STUDY    - Narrative -  This is a study performed at an outside facility with images uploaded to [facility].    ___________________________________________________________________________    XRAY OUTSIDE STUDY    - Narrative -  This is a study performed at an outside facility with images uploaded to [facility].    LAST U/S:  === 05/08/18 ===    - Narrative -  Accession #    [id number]    EXAM:  US ABDOMEN COMPLETE    CLINICAL INDICATION: End-stage renal disease and hypertension.  Kidney transplant evaluation.    COMPARISON: CT dated 04/10/2018    TECHNIQUE: Grayscale and color Doppler ultrasound of the abdomen.    FINDINGS:  Portions of the pancreatic head, neck, and body are visualized and appear unremarkable. The  remainder of the pancreas is obscured by bowel gas.    The liver appears normal in echotexture and morphology without identifiable hepatic mass or  intrahepatic ductal dilatation.    The visualized portions of the abdominal aorta and IVC are unremarkable.    There is normal hepatopedal flow within the main portal vein.    The common bile duct measures 0.6 cm, within normal limits.    The gallbladder is contracted. The gallbladder is otherwise unremarkable.  There is no  gallbladder wall thickening or pericholecystic fluid.  There are no shadowing stones.    The right kidney measures 10.3 cm in length and appears normal in cortical echogenicity.  There  are no identifiable renal masses.  There is no hydronephrosis.  There are no shadowing stones.    The spleen measures a maximum of 9.3 cm in length and appears normal in echotexture and  morphology.    The left kidney measures 10.5 cm in length and appears normal in cortical echogenicity.  There  are no identifiable renal masses.  There is no hydronephrosis.  There are no shadowing stones.    Impression:  Unremarkable ultrasound of the abdomen.    *  F I N A L  *    Transcribed By: RASHID  05/08/18 3:29 pm    Dictated By:            TONA CALVO DO    Electronically Reviewed and Approved By:           TONA CALVO DO  05/08/18 3:34 pm

## 2024-08-02 NOTE — ED TRIAGE NOTES
Right heel lesion x1 week. Painful with bearing weight. No known injury. Last TD 3/14/2012 Vanessa Reyes LPN on 12/7/2020 at 7:22 PM     no

## 2024-11-25 ENCOUNTER — TELEPHONE (OUTPATIENT)
Dept: FAMILY MEDICINE | Facility: CLINIC | Age: 74
End: 2024-11-25
Payer: COMMERCIAL

## 2024-11-25 NOTE — LETTER
November 25, 2024    To  Cyndy JEFFREY Kathleen  [address]    [place] [address]    Your team at [facility] cares about your health. We have reviewed your chart and based on our findings; we are making the following recommendations to better manage your health.     You are in particular need of attention regarding the following:     Call or MyChart message your clinic to schedule a colonoscopy, schedule/ a FIT Test, or order a Cologuard test. If you are unsure what type of test you need, please call your clinic and speak to clinic staff.   Colon cancer is now the second leading cause of cancer-related deaths in the [facility] for both men and women and there are over 130,000 new cases and 50,000 deaths per year from colon cancer. Colonoscopies can prevent 90-95% of these deaths. Problem lesions can be removed before they ever become cancer. This test is not only looking for cancer, but also getting rid of precancerous lesions.   If you are under/uninsured, we recommend you contact the [facility] [facility] is a free colorectal cancer screening program that provides colonoscopies for eligible under/uninsured [place] men and women. If you are interested in receiving a free colonoscopy, please call [facility] at t [phone] (mention code [employer]) to see if you're eligible. Please have them send us the results.   PREVENTATIVE VISIT: Annual Medicare Wellness:Schedule an Annual Medicare Wellness Exam. Please call your [facility] clinic to set up your appointment.  Please call [phone] to schedule a colonoscopy.    If you have already completed these items, please contact the clinic via phone or   [employer] so your care team can review and update your records. Thank you for   choosing [facility] for your healthcare needs. For any questions,   concerns, or to schedule an appointment please contact our clinic.    Healthy Regards,      Your [facility]  Care Team

## 2024-11-25 NOTE — TELEPHONE ENCOUNTER
Patient Quality Outreach    Patient is due for the following:   Colon Cancer Screening  Physical Annual Wellness Visit    Action(s) Taken:   Schedule a Annual Wellness Visit    Type of outreach:    Sent letter.    Questions for provider review:    None           Diamond Pastor MA